# Patient Record
Sex: MALE | Race: BLACK OR AFRICAN AMERICAN | Employment: OTHER | URBAN - METROPOLITAN AREA
[De-identification: names, ages, dates, MRNs, and addresses within clinical notes are randomized per-mention and may not be internally consistent; named-entity substitution may affect disease eponyms.]

---

## 2017-04-07 ENCOUNTER — OFFICE VISIT (OUTPATIENT)
Dept: INTERNAL MEDICINE | Age: 72
End: 2017-04-07

## 2017-04-07 VITALS
SYSTOLIC BLOOD PRESSURE: 118 MMHG | WEIGHT: 207 LBS | DIASTOLIC BLOOD PRESSURE: 80 MMHG | HEIGHT: 64 IN | BODY MASS INDEX: 35.34 KG/M2

## 2017-04-07 DIAGNOSIS — I10 ESSENTIAL HYPERTENSION: Primary | ICD-10-CM

## 2017-04-07 DIAGNOSIS — I50.22 CHRONIC SYSTOLIC CONGESTIVE HEART FAILURE (HCC): ICD-10-CM

## 2017-04-07 DIAGNOSIS — M67.472 GANGLION CYST OF LEFT FOOT: ICD-10-CM

## 2017-04-07 DIAGNOSIS — M70.22 OLECRANON BURSITIS OF LEFT ELBOW: ICD-10-CM

## 2017-04-07 PROCEDURE — 99214 OFFICE O/P EST MOD 30 MIN: CPT | Performed by: INTERNAL MEDICINE

## 2017-04-07 ASSESSMENT — ENCOUNTER SYMPTOMS
SHORTNESS OF BREATH: 1
CHEST TIGHTNESS: 0
BACK PAIN: 0
ABDOMINAL PAIN: 0
EYE REDNESS: 0
NAUSEA: 0

## 2017-07-07 ENCOUNTER — TELEPHONE (OUTPATIENT)
Dept: INTERNAL MEDICINE | Age: 72
End: 2017-07-07

## 2017-07-07 DIAGNOSIS — I42.9 CARDIOMYOPATHY (HCC): ICD-10-CM

## 2017-07-07 DIAGNOSIS — I50.22 CHRONIC SYSTOLIC CONGESTIVE HEART FAILURE (HCC): ICD-10-CM

## 2017-07-07 DIAGNOSIS — E78.2 MIXED HYPERLIPIDEMIA: ICD-10-CM

## 2017-07-07 DIAGNOSIS — I10 ESSENTIAL HYPERTENSION: Primary | ICD-10-CM

## 2017-07-07 DIAGNOSIS — R00.0 TACHYCARDIA: ICD-10-CM

## 2017-07-21 ENCOUNTER — OFFICE VISIT (OUTPATIENT)
Dept: INTERNAL MEDICINE | Age: 72
End: 2017-07-21

## 2017-07-21 VITALS
HEIGHT: 64 IN | SYSTOLIC BLOOD PRESSURE: 100 MMHG | BODY MASS INDEX: 34.83 KG/M2 | WEIGHT: 204 LBS | DIASTOLIC BLOOD PRESSURE: 70 MMHG

## 2017-07-21 DIAGNOSIS — I50.22 CHRONIC SYSTOLIC CONGESTIVE HEART FAILURE (HCC): ICD-10-CM

## 2017-07-21 DIAGNOSIS — I10 ESSENTIAL HYPERTENSION: Primary | ICD-10-CM

## 2017-07-21 PROCEDURE — 99213 OFFICE O/P EST LOW 20 MIN: CPT | Performed by: INTERNAL MEDICINE

## 2017-07-21 ASSESSMENT — ENCOUNTER SYMPTOMS
CHEST TIGHTNESS: 0
NAUSEA: 0
EYE REDNESS: 0
BACK PAIN: 0
SHORTNESS OF BREATH: 1
ABDOMINAL PAIN: 0

## 2017-10-18 ENCOUNTER — TELEPHONE (OUTPATIENT)
Dept: INTERNAL MEDICINE | Age: 72
End: 2017-10-18

## 2017-10-18 DIAGNOSIS — Z12.5 SCREENING PSA (PROSTATE SPECIFIC ANTIGEN): ICD-10-CM

## 2017-10-18 DIAGNOSIS — E78.2 MIXED HYPERLIPIDEMIA: ICD-10-CM

## 2017-10-18 DIAGNOSIS — I10 ESSENTIAL HYPERTENSION: Primary | ICD-10-CM

## 2017-10-18 DIAGNOSIS — D50.0 IRON DEFICIENCY ANEMIA DUE TO CHRONIC BLOOD LOSS: ICD-10-CM

## 2017-10-23 RX ORDER — LOVASTATIN 40 MG/1
TABLET ORAL
Qty: 90 TABLET | Refills: 1 | Status: SHIPPED | OUTPATIENT
Start: 2017-10-23 | End: 2019-01-16 | Stop reason: SDUPTHER

## 2017-10-23 RX ORDER — FERROUS SULFATE 325(65) MG
TABLET ORAL
Qty: 60 TABLET | Refills: 5 | Status: SHIPPED | OUTPATIENT
Start: 2017-10-23 | End: 2018-12-05 | Stop reason: SDUPTHER

## 2017-11-08 ENCOUNTER — OFFICE VISIT (OUTPATIENT)
Dept: INTERNAL MEDICINE | Age: 72
End: 2017-11-08

## 2017-11-08 VITALS
DIASTOLIC BLOOD PRESSURE: 78 MMHG | SYSTOLIC BLOOD PRESSURE: 126 MMHG | HEART RATE: 76 BPM | BODY MASS INDEX: 33.81 KG/M2 | OXYGEN SATURATION: 98 % | WEIGHT: 197 LBS

## 2017-11-08 DIAGNOSIS — Z12.5 SCREENING PSA (PROSTATE SPECIFIC ANTIGEN): ICD-10-CM

## 2017-11-08 DIAGNOSIS — Z23 NEED FOR INFLUENZA VACCINATION: ICD-10-CM

## 2017-11-08 DIAGNOSIS — G47.9 SLEEP DISORDER: ICD-10-CM

## 2017-11-08 DIAGNOSIS — I10 ESSENTIAL HYPERTENSION: ICD-10-CM

## 2017-11-08 DIAGNOSIS — E78.2 MIXED HYPERLIPIDEMIA: ICD-10-CM

## 2017-11-08 DIAGNOSIS — N18.4 CHRONIC KIDNEY DISEASE (CKD), STAGE IV (SEVERE) (HCC): ICD-10-CM

## 2017-11-08 DIAGNOSIS — Z00.00 WELL ADULT EXAM: Primary | ICD-10-CM

## 2017-11-08 DIAGNOSIS — D50.0 IRON DEFICIENCY ANEMIA DUE TO CHRONIC BLOOD LOSS: ICD-10-CM

## 2017-11-08 DIAGNOSIS — I50.22 CHRONIC SYSTOLIC CONGESTIVE HEART FAILURE (HCC): ICD-10-CM

## 2017-11-08 LAB
A/G RATIO: 1.2 (ref 1.1–2.2)
ALBUMIN SERPL-MCNC: 3.7 G/DL (ref 3.4–5)
ALP BLD-CCNC: 83 U/L (ref 40–129)
ALT SERPL-CCNC: 9 U/L (ref 10–40)
ANION GAP SERPL CALCULATED.3IONS-SCNC: 13 MMOL/L (ref 3–16)
AST SERPL-CCNC: 14 U/L (ref 15–37)
BILIRUB SERPL-MCNC: 0.3 MG/DL (ref 0–1)
BUN BLDV-MCNC: 29 MG/DL (ref 7–20)
CALCIUM SERPL-MCNC: 9.3 MG/DL (ref 8.3–10.6)
CHLORIDE BLD-SCNC: 100 MMOL/L (ref 99–110)
CHOLESTEROL, TOTAL: 213 MG/DL (ref 0–199)
CO2: 29 MMOL/L (ref 21–32)
CREAT SERPL-MCNC: 2.3 MG/DL (ref 0.8–1.3)
GFR AFRICAN AMERICAN: 34
GFR NON-AFRICAN AMERICAN: 28
GLOBULIN: 3 G/DL
GLUCOSE BLD-MCNC: 121 MG/DL (ref 70–99)
HCT VFR BLD CALC: 39 % (ref 40.5–52.5)
HDLC SERPL-MCNC: 55 MG/DL (ref 40–60)
HEMOGLOBIN: 12.6 G/DL (ref 13.5–17.5)
LDL CHOLESTEROL CALCULATED: 131 MG/DL
MCH RBC QN AUTO: 31.2 PG (ref 26–34)
MCHC RBC AUTO-ENTMCNC: 32.4 G/DL (ref 31–36)
MCV RBC AUTO: 96.5 FL (ref 80–100)
PDW BLD-RTO: 13.7 % (ref 12.4–15.4)
PLATELET # BLD: 265 K/UL (ref 135–450)
PMV BLD AUTO: 9.2 FL (ref 5–10.5)
POTASSIUM SERPL-SCNC: 4.7 MMOL/L (ref 3.5–5.1)
PROSTATE SPECIFIC ANTIGEN: 1.45 NG/ML (ref 0–4)
RBC # BLD: 4.04 M/UL (ref 4.2–5.9)
SODIUM BLD-SCNC: 142 MMOL/L (ref 136–145)
TOTAL PROTEIN: 6.7 G/DL (ref 6.4–8.2)
TRIGL SERPL-MCNC: 133 MG/DL (ref 0–150)
TSH SERPL DL<=0.05 MIU/L-ACNC: 1.34 UIU/ML (ref 0.27–4.2)
VLDLC SERPL CALC-MCNC: 27 MG/DL
WBC # BLD: 5.8 K/UL (ref 4–11)

## 2017-11-08 PROCEDURE — G0008 ADMIN INFLUENZA VIRUS VAC: HCPCS | Performed by: INTERNAL MEDICINE

## 2017-11-08 PROCEDURE — G0439 PPPS, SUBSEQ VISIT: HCPCS | Performed by: INTERNAL MEDICINE

## 2017-11-08 PROCEDURE — 90662 IIV NO PRSV INCREASED AG IM: CPT | Performed by: INTERNAL MEDICINE

## 2017-11-08 ASSESSMENT — ENCOUNTER SYMPTOMS
BACK PAIN: 0
ABDOMINAL PAIN: 0
SHORTNESS OF BREATH: 0
COUGH: 0
NAUSEA: 0
EYE REDNESS: 0
CHEST TIGHTNESS: 0

## 2017-11-08 ASSESSMENT — PATIENT HEALTH QUESTIONNAIRE - PHQ9
1. LITTLE INTEREST OR PLEASURE IN DOING THINGS: 1
SUM OF ALL RESPONSES TO PHQ9 QUESTIONS 1 & 2: 2
2. FEELING DOWN, DEPRESSED OR HOPELESS: 1
SUM OF ALL RESPONSES TO PHQ QUESTIONS 1-9: 2

## 2017-11-08 NOTE — PROGRESS NOTES
Vaccine Information Sheet, \"Influenza - Inactivated\"  given to Melony Caro, or parent/legal guardian of  Melony Caro and verbalized understanding. Patient responses:    Have you ever had a reaction to a flu vaccine? No  Are you able to eat eggs without adverse effects? Yes  Do you have any current illness? No  Have you ever had [place] Syndrome? No    Flu vaccine given per order. Please see immunization tab.

## 2017-11-08 NOTE — PROGRESS NOTES
Subjective:      Patient ID: Cb Bradley is a 71 y.o. male. Chief Complaint   Patient presents with    Annual Exam     HPI     Well check. He is here with his wife. He does not have a Living Will , but he says he would want everything done. He lives in a 2 story with his wife , and they have done accident reduction. Current Outpatient Prescriptions on File Prior to Visit   Medication Sig Dispense Refill    ferrous sulfate 325 (65 Fe) MG tablet TAKE 1 TABLET BY MOUTH TWICE DAILY WITH MEALS 60 tablet 5    lovastatin (MEVACOR) 40 MG tablet TAKE 1 TABLET BY MOUTH EVERY NIGHT AT BEDTIME 90 tablet 1    torsemide (DEMADEX) 100 MG tablet TAKE 1/2 TABLET BY MOUTH EVERY DAY 30 tablet 5    DOCQLACE 100 MG capsule TAKE ONE CAPSULE BY MOUTH TWICE DAILY 60 capsule 5    lisinopril (PRINIVIL;ZESTRIL) 10 MG tablet Take 10 mg by mouth daily      potassium chloride SA (K-DUR;KLOR-CON M) 20 MEQ tablet       pantoprazole (PROTONIX) 40 MG tablet TAKE 1 TABLET BY MOUTH EVERY DAY 90 tablet 3    aspirin 81 MG chewable tablet Take 81 mg by mouth daily       No current facility-administered medications on file prior to visit.      ,all  Past Medical History:   Diagnosis Date    Cardiomyopathy ([facility] [address]     Cerebrovascular disease     HTN (hypertension) 8/16/2011    Other and unspecified hyperlipidemia 8/16/2011    Sleep disorder     Tachycardia 8/16/2011    TIA (transient ischemic attack) 8/16/2011     No past surgical history on file.   Social History     Social History    Marital status:      Spouse name: N/A    Number of children: N/A    Years of education: N/A     Occupational History           Social History Main Topics    Smoking status: Current Some Day Smoker     Packs/day: 0.50     Types: Cigarettes    Smokeless tobacco: Never Used    Alcohol use No    Drug use: No    Sexual activity: Not on file     Other Topics Concern    Not on file     Social History Narrative    He follows track and field sports. Family History   Problem Relation Age of Onset    Osteoarthritis Father     Hypertension Father     Cancer Father      prostate, rectal     Immunization History   Administered Date(s) Administered    Influenza, High Dose 12/02/2016, 11/08/2017    Pneumococcal 13-valent Conjugate (Felix Ramirez) 03/08/2016       Review of Systems   Constitutional: Negative for fatigue, fever and unexpected weight change. HENT: Negative for congestion and hearing loss. Eyes: Negative for redness and visual disturbance. Respiratory: Negative for cough, chest tightness and shortness of breath. Cardiovascular: Negative for chest pain and palpitations. Gastrointestinal: Negative for abdominal pain and nausea. Endocrine: Negative for polydipsia and polyuria. Genitourinary: Negative for dysuria and hematuria. Musculoskeletal: Negative for arthralgias, back pain and neck pain. Functional ability, able to perform ADLs   Skin: Negative for rash and wound. Neurological: Negative for dizziness and headaches. Hematological: Negative for adenopathy. Does not bruise/bleed easily. Psychiatric/Behavioral: Negative for agitation and confusion. The patient is not nervous/anxious. Cognitive impairment, no       Depression, no        Objective:   Physical Exam   Constitutional: He is oriented to person, place, and time. He appears well-developed and well-nourished. HENT:   Head: Normocephalic and atraumatic. Right Ear: External ear normal.   Left Ear: External ear normal.   Mouth/Throat: Oropharynx is clear and moist. No oropharyngeal exudate. Eyes: Conjunctivae are normal. Pupils are equal, round, and reactive to light. Neck: Normal range of motion. No thyromegaly present. Cardiovascular: Normal rate, regular rhythm and normal heart sounds. No murmur heard. Pulmonary/Chest: Effort normal and breath sounds normal. He has no wheezes. He has no rales. Abdominal: Soft.  Bowel sounds are normal. He exhibits no distension. There is no tenderness. There is no rebound. Musculoskeletal: He exhibits no edema. Lymphadenopathy:     He has no cervical adenopathy. Neurological: He is alert and oriented to person, place, and time. Coordination normal.   Cognitive Impairment, no    Skin: No rash noted. Psychiatric: He has a normal mood and affect. Depression, no        Assessment and plan       1. Well adult exam  Stable. See orders. 2. Essential hypertension  Stable. Continue medications. 3. Chronic systolic congestive heart failure (HCC)  Stable. Continue medications. 4. Mixed hyperlipidemia  Stable. Continue medication. 5. Sleep disorder  Patient refuses treatment. Continuing adverse effects likely. Encouraged to reconsider sleep evaluation. 6. Need for influenza vaccination  Stable. Flu shot today. - INFLUENZA, HIGH DOSE, 65 YRS +, IM, PF, PREFILL SYR, 0.5ML (FLUZONE HD)    Quality & Risk Score Accuracy - MEDICARE ADVANTAGE    Visit Dx:  Chronic kidney disease (CKD), stage IV (severe) (HCC)  Improving based upon last GFR. Continue current treatment plan, including avoidance of nephrotoxins, and follow up at least yearly. Additional documentation:  Based on review of the following: Last GFR:  34 on 11/8/17,Last GFR:  28 on 11/8/17, The blood pressure is 126/78.   Last edited 11/08/17 19:05 EST by Emeka Pantoja MD

## 2017-12-01 ENCOUNTER — OFFICE VISIT (OUTPATIENT)
Dept: INTERNAL MEDICINE | Age: 72
End: 2017-12-01

## 2017-12-01 VITALS
DIASTOLIC BLOOD PRESSURE: 70 MMHG | HEIGHT: 66 IN | BODY MASS INDEX: 32.14 KG/M2 | OXYGEN SATURATION: 98 % | SYSTOLIC BLOOD PRESSURE: 107 MMHG | HEART RATE: 82 BPM | WEIGHT: 200 LBS

## 2017-12-01 DIAGNOSIS — L82.1 SEBORRHEIC KERATOSIS: Primary | ICD-10-CM

## 2017-12-01 PROCEDURE — 99213 OFFICE O/P EST LOW 20 MIN: CPT | Performed by: INTERNAL MEDICINE

## 2017-12-01 ASSESSMENT — ENCOUNTER SYMPTOMS
BACK PAIN: 0
EYE REDNESS: 0
CHEST TIGHTNESS: 0
SHORTNESS OF BREATH: 0
ABDOMINAL PAIN: 0
NAUSEA: 0

## 2017-12-01 NOTE — PROGRESS NOTES
Subjective:      Patient ID: Melony Caro is a 71 y.o. male. Chief Complaint   Patient presents with    Penis Pain     has a growth on penis X 3 days      HPI     Mila Jaquez noted a slightly raised area on the left penile shaft for the last 3 days. It has not changed since he first noted it. He is not sexually active. The area is not red or swollen. The area is not painful and there is no drainage. He has no fever , and no malaise. Current Outpatient Prescriptions on File Prior to Visit   Medication Sig Dispense Refill    ferrous sulfate 325 (65 Fe) MG tablet TAKE 1 TABLET BY MOUTH TWICE DAILY WITH MEALS 60 tablet 5    lovastatin (MEVACOR) 40 MG tablet TAKE 1 TABLET BY MOUTH EVERY NIGHT AT BEDTIME 90 tablet 1    torsemide (DEMADEX) 100 MG tablet TAKE 1/2 TABLET BY MOUTH EVERY DAY 30 tablet 5    DOCQLACE 100 MG capsule TAKE ONE CAPSULE BY MOUTH TWICE DAILY 60 capsule 5    lisinopril (PRINIVIL;ZESTRIL) 10 MG tablet Take 10 mg by mouth daily      potassium chloride SA (K-DUR;KLOR-CON M) 20 MEQ tablet       pantoprazole (PROTONIX) 40 MG tablet TAKE 1 TABLET BY MOUTH EVERY DAY 90 tablet 3    aspirin 81 MG chewable tablet Take 81 mg by mouth daily       No current facility-administered medications on file prior to visit. No Known Allergies      Review of Systems   Constitutional: Negative for fatigue, fever and unexpected weight change. HENT: Negative for hearing loss. Eyes: Negative for redness and visual disturbance. Respiratory: Negative for chest tightness and shortness of breath. Cardiovascular: Negative for chest pain and palpitations. Gastrointestinal: Negative for abdominal pain and nausea. Endocrine: Negative for polydipsia and polyuria. Genitourinary: Negative for dysuria and hematuria. Musculoskeletal: Negative for arthralgias, back pain and neck pain. Skin: Negative for rash and wound. Neurological: Negative for dizziness and headaches.    Hematological: Negative for

## 2018-03-06 ENCOUNTER — TELEPHONE (OUTPATIENT)
Dept: INTERNAL MEDICINE | Age: 73
End: 2018-03-06

## 2018-03-06 DIAGNOSIS — N18.30 CRF (CHRONIC RENAL FAILURE), STAGE 3 (MODERATE) (HCC): ICD-10-CM

## 2018-03-06 DIAGNOSIS — I50.22 CHRONIC SYSTOLIC CONGESTIVE HEART FAILURE (HCC): ICD-10-CM

## 2018-03-06 DIAGNOSIS — D50.0 IRON DEFICIENCY ANEMIA DUE TO CHRONIC BLOOD LOSS: Primary | ICD-10-CM

## 2018-03-06 NOTE — TELEPHONE ENCOUNTER
Pt requesting Lab orders for an appointment pt has scheduled tomorrow 03/07/2018 with Dr. Demetra Marie. Pt would like to have labs done before he comes in to see Dr. Demetra Marie. Please call patient when complete.

## 2018-03-07 ENCOUNTER — OFFICE VISIT (OUTPATIENT)
Dept: INTERNAL MEDICINE | Age: 73
End: 2018-03-07

## 2018-03-07 VITALS
SYSTOLIC BLOOD PRESSURE: 104 MMHG | WEIGHT: 194 LBS | HEART RATE: 80 BPM | DIASTOLIC BLOOD PRESSURE: 64 MMHG | BODY MASS INDEX: 31.31 KG/M2

## 2018-03-07 DIAGNOSIS — N18.30 CRF (CHRONIC RENAL FAILURE), STAGE 3 (MODERATE) (HCC): ICD-10-CM

## 2018-03-07 DIAGNOSIS — I50.22 CHRONIC SYSTOLIC CONGESTIVE HEART FAILURE (HCC): ICD-10-CM

## 2018-03-07 DIAGNOSIS — Z23 NEED FOR PNEUMOCOCCAL VACCINE: ICD-10-CM

## 2018-03-07 DIAGNOSIS — I10 ESSENTIAL HYPERTENSION: ICD-10-CM

## 2018-03-07 DIAGNOSIS — D50.0 IRON DEFICIENCY ANEMIA DUE TO CHRONIC BLOOD LOSS: ICD-10-CM

## 2018-03-07 DIAGNOSIS — I50.22 CHRONIC SYSTOLIC CONGESTIVE HEART FAILURE (HCC): Primary | ICD-10-CM

## 2018-03-07 LAB
ALBUMIN SERPL-MCNC: 4.3 G/DL (ref 3.4–5)
ANION GAP SERPL CALCULATED.3IONS-SCNC: 15 MMOL/L (ref 3–16)
BASOPHILS ABSOLUTE: 0 K/UL (ref 0–0.2)
BASOPHILS RELATIVE PERCENT: 0.4 %
BUN BLDV-MCNC: 32 MG/DL (ref 7–20)
CALCIUM SERPL-MCNC: 9.1 MG/DL (ref 8.3–10.6)
CHLORIDE BLD-SCNC: 97 MMOL/L (ref 99–110)
CO2: 29 MMOL/L (ref 21–32)
CREAT SERPL-MCNC: 2.7 MG/DL (ref 0.8–1.3)
EOSINOPHILS ABSOLUTE: 0.1 K/UL (ref 0–0.6)
EOSINOPHILS RELATIVE PERCENT: 2.8 %
GFR AFRICAN AMERICAN: 28
GFR NON-AFRICAN AMERICAN: 23
GLUCOSE BLD-MCNC: 91 MG/DL (ref 70–99)
HCT VFR BLD CALC: 39.3 % (ref 40.5–52.5)
HEMOGLOBIN: 12.6 G/DL (ref 13.5–17.5)
LYMPHOCYTES ABSOLUTE: 1.3 K/UL (ref 1–5.1)
LYMPHOCYTES RELATIVE PERCENT: 24.8 %
MCH RBC QN AUTO: 30.9 PG (ref 26–34)
MCHC RBC AUTO-ENTMCNC: 32 G/DL (ref 31–36)
MCV RBC AUTO: 96.5 FL (ref 80–100)
MONOCYTES ABSOLUTE: 0.8 K/UL (ref 0–1.3)
MONOCYTES RELATIVE PERCENT: 14.8 %
NEUTROPHILS ABSOLUTE: 3 K/UL (ref 1.7–7.7)
NEUTROPHILS RELATIVE PERCENT: 57.2 %
PDW BLD-RTO: 14.3 % (ref 12.4–15.4)
PHOSPHORUS: 3.9 MG/DL (ref 2.5–4.9)
PLATELET # BLD: 298 K/UL (ref 135–450)
PMV BLD AUTO: 9 FL (ref 5–10.5)
POTASSIUM SERPL-SCNC: 4.6 MMOL/L (ref 3.5–5.1)
PRO-BNP: 385 PG/ML (ref 0–124)
RBC # BLD: 4.07 M/UL (ref 4.2–5.9)
SODIUM BLD-SCNC: 141 MMOL/L (ref 136–145)
WBC # BLD: 5.3 K/UL (ref 4–11)

## 2018-03-07 PROCEDURE — 90732 PPSV23 VACC 2 YRS+ SUBQ/IM: CPT | Performed by: INTERNAL MEDICINE

## 2018-03-07 PROCEDURE — G0009 ADMIN PNEUMOCOCCAL VACCINE: HCPCS | Performed by: INTERNAL MEDICINE

## 2018-03-07 PROCEDURE — 99214 OFFICE O/P EST MOD 30 MIN: CPT | Performed by: INTERNAL MEDICINE

## 2018-03-07 RX ORDER — CALCITRIOL 0.25 UG/1
0.25 CAPSULE, LIQUID FILLED ORAL EVERY OTHER DAY
COMMUNITY
End: 2020-05-15

## 2018-03-07 RX ORDER — ERGOCALCIFEROL (VITAMIN D2) 1250 MCG
50000 CAPSULE ORAL WEEKLY
COMMUNITY
End: 2020-05-15

## 2018-03-07 ASSESSMENT — ENCOUNTER SYMPTOMS
ABDOMINAL PAIN: 0
EYE REDNESS: 0
NAUSEA: 0
SHORTNESS OF BREATH: 1
BACK PAIN: 0
CHEST TIGHTNESS: 0

## 2018-03-07 NOTE — PROGRESS NOTES
Subjective:      Patient ID: Dante Madden is a 71 y.o. male. Chief Complaint   Patient presents with    Hypertension     follow up           Hypertension   This is a chronic problem. The current episode started more than 1 year ago. The problem is unchanged. The problem is controlled. Associated symptoms include malaise/fatigue and shortness of breath. Pertinent negatives include no chest pain, headaches, neck pain or palpitations. There are no associated agents to hypertension. Risk factors for coronary artery disease include smoking/tobacco exposure, sedentary lifestyle and obesity. Past treatments include ACE inhibitors and diuretics. The current treatment provides significant improvement. Compliance problems include psychosocial issues. Congestive Heart Failure   Presents for follow-up visit. Associated symptoms include fatigue and shortness of breath. Pertinent negatives include no abdominal pain, chest pain, palpitations or unexpected weight change. The symptoms have been stable. Compliance with total regimen is 26-50%. Compliance problems include adherence to diet and medication side effects. Compliance with diet is 26-50%. Compliance with exercise is 0-25%. Compliance with medications is 26-50%. Chronic renal failure. Patient continues on his diuretic and ACE inhibitor. He is trying to modify his protein intake. He is compliant with regular office visits to maintain his fluid status. He denies any increased edema in his lower extremities. He says his urine production has been stable.     Current Outpatient Prescriptions on File Prior to Visit   Medication Sig Dispense Refill    ferrous sulfate 325 (65 Fe) MG tablet TAKE 1 TABLET BY MOUTH TWICE DAILY WITH MEALS 60 tablet 5    lovastatin (MEVACOR) 40 MG tablet TAKE 1 TABLET BY MOUTH EVERY NIGHT AT BEDTIME 90 tablet 1    torsemide (DEMADEX) 100 MG tablet TAKE 1/2 TABLET BY MOUTH EVERY DAY 30 tablet 5    DOCQLACE 100 MG capsule TAKE ONE CAPSULE BY MOUTH TWICE DAILY 60 capsule 5    lisinopril (PRINIVIL;ZESTRIL) 10 MG tablet Take 10 mg by mouth daily      potassium chloride SA (K-DUR;KLOR-CON M) 20 MEQ tablet       aspirin 81 MG chewable tablet Take 81 mg by mouth daily       No current facility-administered medications on file prior to visit. No Known Allergies      Review of Systems   Constitutional: Positive for fatigue and malaise/fatigue. Negative for fever and unexpected weight change. HENT: Negative for hearing loss. Eyes: Negative for redness and visual disturbance. Respiratory: Positive for shortness of breath. Negative for chest tightness. Cardiovascular: Negative for chest pain and palpitations. Gastrointestinal: Negative for abdominal pain and nausea. Endocrine: Negative for cold intolerance, polydipsia and polyuria. Genitourinary: Negative for dysuria and hematuria. Musculoskeletal: Negative for arthralgias, back pain and neck pain. Skin: Negative for rash and wound. Allergic/Immunologic: Negative for environmental allergies. Neurological: Negative for dizziness and headaches. Hematological: Negative for adenopathy. Does not bruise/bleed easily. Psychiatric/Behavioral: Positive for sleep disturbance. Negative for agitation. The patient is not nervous/anxious. Objective:   Physical Exam   Constitutional: He is oriented to person, place, and time. He appears well-developed and well-nourished. Cardiovascular: Normal rate, regular rhythm and normal heart sounds. No murmur heard. Pulmonary/Chest: Effort normal and breath sounds normal. He has no wheezes. He has no rales. Musculoskeletal: He exhibits no edema. Neurological: He is alert and oriented to person, place, and time. Skin: No rash noted. Assessment and plan       1. Essential hypertension  Stable. Continue current medication. 2. Chronic systolic congestive heart failure (HCC)  Stable. Continue current medications.     3.

## 2018-07-31 ENCOUNTER — TELEPHONE (OUTPATIENT)
Dept: INTERNAL MEDICINE | Age: 73
End: 2018-07-31

## 2018-07-31 DIAGNOSIS — I10 ESSENTIAL HYPERTENSION: ICD-10-CM

## 2018-07-31 DIAGNOSIS — I50.22 CHRONIC SYSTOLIC CONGESTIVE HEART FAILURE (HCC): Primary | ICD-10-CM

## 2018-07-31 RX ORDER — LISINOPRIL 10 MG/1
10 TABLET ORAL DAILY
Qty: 90 TABLET | Refills: 3 | Status: SHIPPED | OUTPATIENT
Start: 2018-07-31 | End: 2019-06-25

## 2018-09-25 ENCOUNTER — TELEPHONE (OUTPATIENT)
Dept: INTERNAL MEDICINE CLINIC | Age: 73
End: 2018-09-25

## 2018-09-25 DIAGNOSIS — Z12.5 SCREENING PSA (PROSTATE SPECIFIC ANTIGEN): ICD-10-CM

## 2018-09-25 DIAGNOSIS — N18.30 CRF (CHRONIC RENAL FAILURE), STAGE 3 (MODERATE) (HCC): ICD-10-CM

## 2018-09-25 DIAGNOSIS — E78.2 MIXED HYPERLIPIDEMIA: Primary | ICD-10-CM

## 2018-09-25 DIAGNOSIS — D64.9 ANEMIA, UNSPECIFIED TYPE: ICD-10-CM

## 2018-09-25 DIAGNOSIS — Z00.00 WELL ADULT EXAM: ICD-10-CM

## 2018-10-22 ENCOUNTER — OFFICE VISIT (OUTPATIENT)
Dept: INTERNAL MEDICINE CLINIC | Age: 73
End: 2018-10-22
Payer: MEDICARE

## 2018-10-22 VITALS
BODY MASS INDEX: 34.21 KG/M2 | OXYGEN SATURATION: 95 % | RESPIRATION RATE: 16 BRPM | HEIGHT: 64 IN | DIASTOLIC BLOOD PRESSURE: 60 MMHG | HEART RATE: 91 BPM | WEIGHT: 200.4 LBS | SYSTOLIC BLOOD PRESSURE: 100 MMHG

## 2018-10-22 DIAGNOSIS — M10.371 ACUTE GOUT DUE TO RENAL IMPAIRMENT INVOLVING RIGHT FOOT: ICD-10-CM

## 2018-10-22 DIAGNOSIS — J40 BRONCHITIS: Primary | ICD-10-CM

## 2018-10-22 DIAGNOSIS — Z72.0 TOBACCO ABUSE: ICD-10-CM

## 2018-10-22 DIAGNOSIS — Z13.6 SCREENING FOR AAA (ABDOMINAL AORTIC ANEURYSM): ICD-10-CM

## 2018-10-22 PROCEDURE — 99213 OFFICE O/P EST LOW 20 MIN: CPT | Performed by: INTERNAL MEDICINE

## 2018-10-22 PROCEDURE — G8510 SCR DEP NEG, NO PLAN REQD: HCPCS | Performed by: INTERNAL MEDICINE

## 2018-10-22 RX ORDER — DOXYCYCLINE HYCLATE 100 MG
100 TABLET ORAL 2 TIMES DAILY
COMMUNITY
End: 2018-10-22 | Stop reason: SDUPTHER

## 2018-10-22 RX ORDER — NICOTINE 21 MG/24HR
1 PATCH, TRANSDERMAL 24 HOURS TRANSDERMAL EVERY 24 HOURS
Qty: 30 PATCH | Refills: 3 | Status: SHIPPED | OUTPATIENT
Start: 2018-10-22 | End: 2019-12-02

## 2018-10-22 RX ORDER — PREDNISONE 10 MG/1
10 TABLET ORAL SEE ADMIN INSTRUCTIONS
Qty: 30 TABLET | Refills: 0 | Status: SHIPPED | OUTPATIENT
Start: 2018-10-22 | End: 2018-12-14 | Stop reason: SDUPTHER

## 2018-10-22 RX ORDER — DOXYCYCLINE HYCLATE 100 MG
100 TABLET ORAL 2 TIMES DAILY WITH MEALS
Qty: 14 TABLET | Refills: 0 | Status: SHIPPED | OUTPATIENT
Start: 2018-10-22 | End: 2018-10-29 | Stop reason: ALTCHOICE

## 2018-10-22 RX ORDER — ALBUTEROL SULFATE 90 UG/1
2 AEROSOL, METERED RESPIRATORY (INHALATION) EVERY 6 HOURS PRN
Qty: 1 INHALER | Status: CANCELLED | OUTPATIENT
Start: 2018-10-22

## 2018-10-22 RX ORDER — CARVEDILOL 6.25 MG/1
12.5 TABLET ORAL DAILY
COMMUNITY
End: 2018-12-12 | Stop reason: SDUPTHER

## 2018-10-22 RX ORDER — ALBUTEROL SULFATE 90 UG/1
2 AEROSOL, METERED RESPIRATORY (INHALATION) EVERY 4 HOURS PRN
Qty: 1 INHALER | Refills: 3 | Status: SHIPPED | OUTPATIENT
Start: 2018-10-22 | End: 2020-09-25

## 2018-10-22 RX ORDER — ALBUTEROL SULFATE 90 UG/1
2 AEROSOL, METERED RESPIRATORY (INHALATION) EVERY 6 HOURS PRN
COMMUNITY
End: 2019-12-02

## 2018-10-22 ASSESSMENT — ENCOUNTER SYMPTOMS
SHORTNESS OF BREATH: 0
EYE REDNESS: 0
BACK PAIN: 0
CHEST TIGHTNESS: 0
NAUSEA: 0
WHEEZING: 0
COUGH: 1
ABDOMINAL PAIN: 0

## 2018-10-22 ASSESSMENT — PATIENT HEALTH QUESTIONNAIRE - PHQ9
SUM OF ALL RESPONSES TO PHQ QUESTIONS 1-9: 0
SUM OF ALL RESPONSES TO PHQ9 QUESTIONS 1 & 2: 0
1. LITTLE INTEREST OR PLEASURE IN DOING THINGS: 0
2. FEELING DOWN, DEPRESSED OR HOPELESS: 0
SUM OF ALL RESPONSES TO PHQ QUESTIONS 1-9: 0

## 2018-11-09 DIAGNOSIS — N18.30 CRF (CHRONIC RENAL FAILURE), STAGE 3 (MODERATE) (HCC): ICD-10-CM

## 2018-11-09 DIAGNOSIS — E78.2 MIXED HYPERLIPIDEMIA: ICD-10-CM

## 2018-11-09 DIAGNOSIS — D64.9 ANEMIA, UNSPECIFIED TYPE: ICD-10-CM

## 2018-11-09 DIAGNOSIS — Z12.5 SCREENING PSA (PROSTATE SPECIFIC ANTIGEN): ICD-10-CM

## 2018-11-09 LAB
A/G RATIO: 1.6 (ref 1.1–2.2)
ALBUMIN SERPL-MCNC: 3.9 G/DL (ref 3.4–5)
ALP BLD-CCNC: 67 U/L (ref 40–129)
ALT SERPL-CCNC: 7 U/L (ref 10–40)
ANION GAP SERPL CALCULATED.3IONS-SCNC: 16 MMOL/L (ref 3–16)
AST SERPL-CCNC: 10 U/L (ref 15–37)
BASOPHILS ABSOLUTE: 0 K/UL (ref 0–0.2)
BASOPHILS RELATIVE PERCENT: 0.6 %
BILIRUB SERPL-MCNC: 0.3 MG/DL (ref 0–1)
BUN BLDV-MCNC: 29 MG/DL (ref 7–20)
CALCIUM SERPL-MCNC: 9.4 MG/DL (ref 8.3–10.6)
CHLORIDE BLD-SCNC: 103 MMOL/L (ref 99–110)
CHOLESTEROL, TOTAL: 192 MG/DL (ref 0–199)
CO2: 25 MMOL/L (ref 21–32)
CREAT SERPL-MCNC: 2.2 MG/DL (ref 0.8–1.3)
EOSINOPHILS ABSOLUTE: 0.1 K/UL (ref 0–0.6)
EOSINOPHILS RELATIVE PERCENT: 2.5 %
GFR AFRICAN AMERICAN: 36
GFR NON-AFRICAN AMERICAN: 30
GLOBULIN: 2.5 G/DL
GLUCOSE BLD-MCNC: 95 MG/DL (ref 70–99)
HCT VFR BLD CALC: 31.8 % (ref 40.5–52.5)
HDLC SERPL-MCNC: 69 MG/DL (ref 40–60)
HEMOGLOBIN: 10.1 G/DL (ref 13.5–17.5)
LDL CHOLESTEROL CALCULATED: 101 MG/DL
LYMPHOCYTES ABSOLUTE: 1 K/UL (ref 1–5.1)
LYMPHOCYTES RELATIVE PERCENT: 17.5 %
MCH RBC QN AUTO: 30.5 PG (ref 26–34)
MCHC RBC AUTO-ENTMCNC: 31.9 G/DL (ref 31–36)
MCV RBC AUTO: 95.8 FL (ref 80–100)
MONOCYTES ABSOLUTE: 0.6 K/UL (ref 0–1.3)
MONOCYTES RELATIVE PERCENT: 11.2 %
NEUTROPHILS ABSOLUTE: 3.8 K/UL (ref 1.7–7.7)
NEUTROPHILS RELATIVE PERCENT: 68.2 %
PDW BLD-RTO: 16.2 % (ref 12.4–15.4)
PLATELET # BLD: 222 K/UL (ref 135–450)
PMV BLD AUTO: 8.9 FL (ref 5–10.5)
POTASSIUM SERPL-SCNC: 4.6 MMOL/L (ref 3.5–5.1)
PROSTATE SPECIFIC ANTIGEN: 1.58 NG/ML (ref 0–4)
RBC # BLD: 3.32 M/UL (ref 4.2–5.9)
SODIUM BLD-SCNC: 144 MMOL/L (ref 136–145)
TOTAL PROTEIN: 6.4 G/DL (ref 6.4–8.2)
TRIGL SERPL-MCNC: 111 MG/DL (ref 0–150)
TSH SERPL DL<=0.05 MIU/L-ACNC: 1.12 UIU/ML (ref 0.27–4.2)
VLDLC SERPL CALC-MCNC: 22 MG/DL
WBC # BLD: 5.5 K/UL (ref 4–11)

## 2018-11-12 ENCOUNTER — OFFICE VISIT (OUTPATIENT)
Dept: INTERNAL MEDICINE CLINIC | Age: 73
End: 2018-11-12
Payer: MEDICARE

## 2018-11-12 VITALS
RESPIRATION RATE: 14 BRPM | WEIGHT: 202 LBS | HEART RATE: 79 BPM | OXYGEN SATURATION: 95 % | DIASTOLIC BLOOD PRESSURE: 62 MMHG | SYSTOLIC BLOOD PRESSURE: 110 MMHG | BODY MASS INDEX: 34.49 KG/M2 | HEIGHT: 64 IN

## 2018-11-12 DIAGNOSIS — I42.0 DILATED CARDIOMYOPATHY (HCC): ICD-10-CM

## 2018-11-12 DIAGNOSIS — E78.2 MIXED HYPERLIPIDEMIA: ICD-10-CM

## 2018-11-12 DIAGNOSIS — N18.30 CRF (CHRONIC RENAL FAILURE), STAGE 3 (MODERATE) (HCC): ICD-10-CM

## 2018-11-12 DIAGNOSIS — I50.22 CHRONIC SYSTOLIC CONGESTIVE HEART FAILURE (HCC): ICD-10-CM

## 2018-11-12 DIAGNOSIS — G47.9 SLEEP DISORDER: ICD-10-CM

## 2018-11-12 DIAGNOSIS — Z00.00 WELL ADULT EXAM: Primary | ICD-10-CM

## 2018-11-12 DIAGNOSIS — I10 ESSENTIAL HYPERTENSION: ICD-10-CM

## 2018-11-12 DIAGNOSIS — F33.1 MODERATE EPISODE OF RECURRENT MAJOR DEPRESSIVE DISORDER (HCC): ICD-10-CM

## 2018-11-12 DIAGNOSIS — Z00.00 ROUTINE GENERAL MEDICAL EXAMINATION AT A HEALTH CARE FACILITY: ICD-10-CM

## 2018-11-12 PROCEDURE — G0438 PPPS, INITIAL VISIT: HCPCS | Performed by: INTERNAL MEDICINE

## 2018-11-12 ASSESSMENT — ENCOUNTER SYMPTOMS
EYE REDNESS: 0
BACK PAIN: 0
NAUSEA: 0
SHORTNESS OF BREATH: 0
CHEST TIGHTNESS: 0
ABDOMINAL PAIN: 0

## 2018-11-12 ASSESSMENT — PATIENT HEALTH QUESTIONNAIRE - PHQ9: SUM OF ALL RESPONSES TO PHQ QUESTIONS 1-9: 11

## 2018-11-12 NOTE — PATIENT INSTRUCTIONS
Personalized Preventive Plan for [facility] DENISE - SHERWIN - 11/12/2018  Medicare offers a range of preventive health benefits. Some of the tests and screenings are paid in full while other may be subject to a deductible, co-insurance, and/or copay. Some of these benefits include a comprehensive review of your medical history including lifestyle, illnesses that may run in your family, and various assessments and screenings as appropriate. After reviewing your medical record and screening and assessments performed today your provider may have ordered immunizations, labs, imaging, and/or referrals for you. A list of these orders (if applicable) as well as your Preventive Care list are included within your After Visit Summary for your review. Other Preventive Recommendations:    · A preventive eye exam performed by an eye specialist is recommended every 1-2 years to screen for glaucoma; cataracts, macular degeneration, and other eye disorders. · A preventive dental visit is recommended every 6 months. · Try to get at least 150 minutes of exercise per week or 10,000 steps per day on a pedometer . · Order or download the FREE \"Exercise & Physical Activity: Your Everyday Guide\" from [employer]. Call 0-181.400.9012 or search [employer] online. · You need 6791-3110 mg of calcium and 2040-9282 IU of vitamin D per day. It is possible to meet your calcium requirement with diet alone, but a vitamin D supplement is usually necessary to meet this goal.  · When exposed to the sun, use a sunscreen that protects against both UVA and UVB radiation with an SPF of 30 or greater. Reapply every 2 to 3 hours or after sweating, drying off with a towel, or swimming. · Always wear a seat belt when traveling in a car. Always wear a helmet when riding a bicycle or motorcycle.

## 2018-11-12 NOTE — PROGRESS NOTES
10/22/18 200 lb 6.4 oz (90.9 kg)   03/07/18 194 lb (88 kg)     Vitals:    11/12/18 1506   BP: 110/62   Site: Right Upper Arm   Position: Sitting   Cuff Size: Large Adult   Pulse: 79   Resp: 14   SpO2: 95%   Weight: 202 lb (91.6 kg)   Height: 5' 4\" (1.626 m)     Body mass index is 34.67 kg/m². General Appearance: alert and oriented to person, place and time, well developed and well- nourished, in no acute distress  Skin: warm and dry, no rash or erythema  Head: normocephalic and atraumatic  Eyes: pupils equal, round, and reactive to light, extraocular eye movements intact, conjunctivae normal  ENT: tympanic membrane, external ear and ear canal normal bilaterally, nose without deformity, nasal mucosa and turbinates normal without polyps  Neck: supple and non-tender without mass, no thyromegaly or thyroid nodules, no cervical lymphadenopathy  Pulmonary/Chest: clear to auscultation bilaterally- no wheezes, rales or rhonchi, normal air movement, no respiratory distress  Cardiovascular: normal rate, regular rhythm, normal S1 and S2, no murmurs, rubs, clicks, or gallops, distal pulses intact, no carotid bruits  Abdomen: soft, non-tender, non-distended, normal bowel sounds, no masses or organomegaly  Extremities: no cyanosis, clubbing or edema  Musculoskeletal: normal range of motion, no joint swelling, deformity or tenderness  Neurologic: reflexes normal and symmetric, no cranial nerve deficit, gait, coordination and speech normal      Patient's complete Health Risk Assessment and screening values have been reviewed and are found in Flowsheets. The following problems were reviewed today and where indicated follow up appointments were made and/or referrals ordered. Positive Risk Factor Screenings with Interventions:     Depression:  PHQ-2 Score: 4  PHQ-9 Total Score: 11  Depression Screening Interpretation: (!) PHQ-9 Score 10-14:  Moderate Depression  Depression Interventions:  · Counseling/psychotherapy referral

## 2018-11-13 ENCOUNTER — HOSPITAL ENCOUNTER (OUTPATIENT)
Dept: ULTRASOUND IMAGING | Age: 73
Discharge: HOME | End: 2018-11-13
Payer: MEDICARE

## 2018-11-13 DIAGNOSIS — Z13.6 SCREENING FOR AAA (ABDOMINAL AORTIC ANEURYSM): ICD-10-CM

## 2018-11-13 PROCEDURE — 76706 US ABDL AORTA SCREEN AAA: CPT

## 2018-11-14 ENCOUNTER — TELEPHONE (OUTPATIENT)
Dept: PHARMACY | Facility: CLINIC | Age: 73
End: 2018-11-14

## 2018-11-15 NOTE — TELEPHONE ENCOUNTER
CLINICAL PHARMACY NOTE  Post-Discharge Transitions of Care (AYANNA)    Made second attempt to reach patient for transitions of care follow-up after discharge from [facility] on 10/17/18. No answer on home phone. Will send letter to patient today and sign off for now.       Diania Lanes, PharmD  [address] [facility] Pharmacist  [phone] or [phone] (Option 7)    CLINICAL PHARMACY NOTE   POST-DISCHARGE TELEPHONE FOLLOW-UP ADDENDUM    For Pharmacy Admin Tracking Only    TCM Call Made?: No  [facility]) Select Patient?: Yes  Total # of Interventions Recommended: 0  Total # Interventions Accepted: 0  Intervention Severity:   - Level 1 Intervention Present?: No   - Level 2 #: 0   - Level 3 #: 0  Outreach Status: Patient Refused  Care Coordinator Outreach to Patient?: No  Provider Contacted?: No  Waiting on response from: N/A  Time Spent (min): 10    Additional Documentation:

## 2018-11-30 ENCOUNTER — OFFICE VISIT (OUTPATIENT)
Dept: PSYCHOLOGY | Age: 73
End: 2018-11-30
Payer: MEDICARE

## 2018-11-30 DIAGNOSIS — F33.1 MODERATE EPISODE OF RECURRENT MAJOR DEPRESSIVE DISORDER (HCC): Primary | ICD-10-CM

## 2018-11-30 PROCEDURE — 90791 PSYCH DIAGNOSTIC EVALUATION: CPT | Performed by: PSYCHOLOGIST

## 2018-11-30 ASSESSMENT — PATIENT HEALTH QUESTIONNAIRE - PHQ9
SUM OF ALL RESPONSES TO PHQ QUESTIONS 1-9: 14
7. TROUBLE CONCENTRATING ON THINGS, SUCH AS READING THE NEWSPAPER OR WATCHING TELEVISION: 2
SUM OF ALL RESPONSES TO PHQ9 QUESTIONS 1 & 2: 4
5. POOR APPETITE OR OVEREATING: 3
1. LITTLE INTEREST OR PLEASURE IN DOING THINGS: 2
3. TROUBLE FALLING OR STAYING ASLEEP: 0
SUM OF ALL RESPONSES TO PHQ QUESTIONS 1-9: 14
4. FEELING TIRED OR HAVING LITTLE ENERGY: 3
8. MOVING OR SPEAKING SO SLOWLY THAT OTHER PEOPLE COULD HAVE NOTICED. OR THE OPPOSITE, BEING SO FIGETY OR RESTLESS THAT YOU HAVE BEEN MOVING AROUND A LOT MORE THAN USUAL: 1
10. IF YOU CHECKED OFF ANY PROBLEMS, HOW DIFFICULT HAVE THESE PROBLEMS MADE IT FOR YOU TO DO YOUR WORK, TAKE CARE OF THINGS AT HOME, OR GET ALONG WITH OTHER PEOPLE: 1
2. FEELING DOWN, DEPRESSED OR HOPELESS: 2
6. FEELING BAD ABOUT YOURSELF - OR THAT YOU ARE A FAILURE OR HAVE LET YOURSELF OR YOUR FAMILY DOWN: 1
9. THOUGHTS THAT YOU WOULD BE BETTER OFF DEAD, OR OF HURTING YOURSELF: 0

## 2018-11-30 ASSESSMENT — ANXIETY QUESTIONNAIRES
1. FEELING NERVOUS, ANXIOUS, OR ON EDGE: 1-SEVERAL DAYS
6. BECOMING EASILY ANNOYED OR IRRITABLE: 2-OVER HALF THE DAYS
2. NOT BEING ABLE TO STOP OR CONTROL WORRYING: 1-SEVERAL DAYS
5. BEING SO RESTLESS THAT IT IS HARD TO SIT STILL: 0-NOT AT ALL SURE
7. FEELING AFRAID AS IF SOMETHING AWFUL MIGHT HAPPEN: 0-NOT AT ALL SURE
3. WORRYING TOO MUCH ABOUT DIFFERENT THINGS: 1-SEVERAL DAYS
GAD7 TOTAL SCORE: 6
4. TROUBLE RELAXING: 1-SEVERAL DAYS

## 2018-12-06 RX ORDER — FERROUS SULFATE 325(65) MG
TABLET ORAL
Qty: 60 TABLET | Refills: 3 | Status: SHIPPED | OUTPATIENT
Start: 2018-12-06 | End: 2019-09-25

## 2018-12-12 ENCOUNTER — OFFICE VISIT (OUTPATIENT)
Dept: PSYCHOLOGY | Age: 73
End: 2018-12-12
Payer: MEDICARE

## 2018-12-12 DIAGNOSIS — F33.1 MODERATE EPISODE OF RECURRENT MAJOR DEPRESSIVE DISORDER (HCC): Primary | ICD-10-CM

## 2018-12-12 PROCEDURE — 90832 PSYTX W PT 30 MINUTES: CPT | Performed by: PSYCHOLOGIST

## 2018-12-12 RX ORDER — CARVEDILOL 6.25 MG/1
12.5 TABLET ORAL DAILY
Qty: 60 TABLET | Refills: 1 | Status: SHIPPED | OUTPATIENT
Start: 2018-12-12 | End: 2019-03-02 | Stop reason: SDUPTHER

## 2018-12-12 NOTE — PROGRESS NOTES
the lungs every 6 hours as needed for Wheezing      mometasone (ASMANEX) 200 MCG/ACT AERO inhaler Inhale 2 puffs into the lungs 2 times daily      mometasone-formoterol (DULERA) 200-5 MCG/ACT inhaler Inhale 2 puffs into the lungs 2 times daily 1 Inhaler 5    albuterol sulfate HFA (PROAIR HFA) 108 (90 Base) MCG/ACT inhaler Inhale 2 puffs into the lungs every 4 hours as needed for Shortness of Breath 1 Inhaler 3    nicotine (NICODERM CQ) 21 MG/24HR Place 1 patch onto the skin every 24 hours (take off at bedtime) 30 patch 3    lisinopril (PRINIVIL;ZESTRIL) 10 MG tablet Take 1 tablet by mouth daily 90 tablet 3    STOOL SOFTENER 100 MG capsule TAKE ONE CAPSULE BY MOUTH TWICE A DAY 60 capsule 5    ergocalciferol (ERGOCALCIFEROL) 81350 units capsule Take 50,000 Units by mouth once a week      calcitRIOL (ROCALTROL) 0.25 MCG capsule Take 0.25 mcg by mouth every other day On Monday, Wednesday, and friday      lovastatin (MEVACOR) 40 MG tablet TAKE 1 TABLET BY MOUTH EVERY NIGHT AT BEDTIME 90 tablet 1    torsemide (DEMADEX) 100 MG tablet TAKE 1/2 TABLET BY MOUTH EVERY DAY 30 tablet 5    potassium chloride SA (K-DUR;KLOR-CON M) 20 MEQ tablet Take by mouth daily       aspirin 81 MG chewable tablet Take 81 mg by mouth daily       No current facility-administered medications for this visit. Social History:   Social History     Social History    Marital status:      Spouse name: N/A    Number of children: 3    Years of education: N/A     Occupational History           Social History Main Topics    Smoking status: Former Smoker     Packs/day: 0.50     Years: 30.00     Types: Cigarettes     Quit date: 10/13/2018    Smokeless tobacco: Never Used    Alcohol use No    Drug use: No    Sexual activity: Not on file     Other Topics Concern    Not on file     Social History Narrative    He follows [employer] and field sports. 3 children and 5 grandchildren.         TOBACCO:   reports that he quit

## 2018-12-13 ENCOUNTER — TELEPHONE (OUTPATIENT)
Dept: INTERNAL MEDICINE CLINIC | Age: 73
End: 2018-12-13

## 2018-12-13 DIAGNOSIS — M54.50 CHRONIC MIDLINE LOW BACK PAIN WITHOUT SCIATICA: Primary | ICD-10-CM

## 2018-12-13 DIAGNOSIS — J40 BRONCHITIS: ICD-10-CM

## 2018-12-13 DIAGNOSIS — M10.371 ACUTE GOUT DUE TO RENAL IMPAIRMENT INVOLVING RIGHT FOOT: ICD-10-CM

## 2018-12-13 DIAGNOSIS — G89.29 CHRONIC MIDLINE LOW BACK PAIN WITHOUT SCIATICA: Primary | ICD-10-CM

## 2018-12-13 RX ORDER — ACETAMINOPHEN 325 MG/1
650 TABLET ORAL EVERY 4 HOURS PRN
Qty: 120 TABLET | Refills: 3 | Status: SHIPPED | OUTPATIENT
Start: 2018-12-13 | End: 2020-05-15

## 2018-12-14 RX ORDER — PREDNISONE 10 MG/1
10 TABLET ORAL SEE ADMIN INSTRUCTIONS
Qty: 30 TABLET | Refills: 0 | Status: SHIPPED | OUTPATIENT
Start: 2018-12-14 | End: 2018-12-26

## 2018-12-18 ENCOUNTER — OFFICE VISIT (OUTPATIENT)
Dept: PSYCHOLOGY | Age: 73
End: 2018-12-18
Payer: MEDICARE

## 2018-12-18 DIAGNOSIS — F33.1 MODERATE EPISODE OF RECURRENT MAJOR DEPRESSIVE DISORDER (HCC): Primary | ICD-10-CM

## 2018-12-18 PROCEDURE — 90832 PSYTX W PT 30 MINUTES: CPT | Performed by: PSYCHOLOGIST

## 2018-12-18 ASSESSMENT — ANXIETY QUESTIONNAIRES
4. TROUBLE RELAXING: 0-NOT AT ALL SURE
6. BECOMING EASILY ANNOYED OR IRRITABLE: 1-SEVERAL DAYS
7. FEELING AFRAID AS IF SOMETHING AWFUL MIGHT HAPPEN: 0-NOT AT ALL SURE
5. BEING SO RESTLESS THAT IT IS HARD TO SIT STILL: 1-SEVERAL DAYS
3. WORRYING TOO MUCH ABOUT DIFFERENT THINGS: 1-SEVERAL DAYS
GAD7 TOTAL SCORE: 4
1. FEELING NERVOUS, ANXIOUS, OR ON EDGE: 1-SEVERAL DAYS
2. NOT BEING ABLE TO STOP OR CONTROL WORRYING: 0-NOT AT ALL SURE

## 2018-12-18 ASSESSMENT — PATIENT HEALTH QUESTIONNAIRE - PHQ9
2. FEELING DOWN, DEPRESSED OR HOPELESS: 1
4. FEELING TIRED OR HAVING LITTLE ENERGY: 3
SUM OF ALL RESPONSES TO PHQ QUESTIONS 1-9: 11
7. TROUBLE CONCENTRATING ON THINGS, SUCH AS READING THE NEWSPAPER OR WATCHING TELEVISION: 0
8. MOVING OR SPEAKING SO SLOWLY THAT OTHER PEOPLE COULD HAVE NOTICED. OR THE OPPOSITE, BEING SO FIGETY OR RESTLESS THAT YOU HAVE BEEN MOVING AROUND A LOT MORE THAN USUAL: 1
6. FEELING BAD ABOUT YOURSELF - OR THAT YOU ARE A FAILURE OR HAVE LET YOURSELF OR YOUR FAMILY DOWN: 0
SUM OF ALL RESPONSES TO PHQ QUESTIONS 1-9: 11
1. LITTLE INTEREST OR PLEASURE IN DOING THINGS: 2
SUM OF ALL RESPONSES TO PHQ9 QUESTIONS 1 & 2: 3
10. IF YOU CHECKED OFF ANY PROBLEMS, HOW DIFFICULT HAVE THESE PROBLEMS MADE IT FOR YOU TO DO YOUR WORK, TAKE CARE OF THINGS AT HOME, OR GET ALONG WITH OTHER PEOPLE: 0
3. TROUBLE FALLING OR STAYING ASLEEP: 3
9. THOUGHTS THAT YOU WOULD BE BETTER OFF DEAD, OR OF HURTING YOURSELF: 0
5. POOR APPETITE OR OVEREATING: 1

## 2018-12-18 NOTE — PROGRESS NOTES
Behavioral Health Consultation  [address] PsyD  Psychologist  12/18/2018  1:36 PM      Time spent with Patient: 30 minutes  This is patient's third [facility] appointment. Reason for Consult:  Depression, anxiety  Referring Provider: Anderson Francisco MD  [address], [facility]    Feedback given to PCP. S:    Pt reports he is \"doing great. \" Is looking forward to the holidays  Daughter will be visiting. Feeling ok with how things are and not needing to motivate himself to stay busy even though he feels others are pressuring him to do so. Enjoying relaxing. Is still struggling with recent family conflict with sister. Not trying to prove himself anymore and people are having a hard time with the change in his appraoches and behavior. He is content. Does want to be more than content though. Does want to pursue being happy and learn to manage the conflict better.       O:  MSE:    Appearance    alert, cooperative  Sleep disturbance Yes  Fatigue Yes  Loss of pleasure Yes  Impulsive behavior No  Speech    normal rate, normal volume and well articulated  Mood    \"great\"  Affect     Congruent to thought content and mood  Thought Content    intact  Thought Process    linear and goal directed  Associations    logical connections  Insight    Good  Judgment    Intact  Orientation    oriented to person, place, time, and general circumstances  Memory    recent and remote memory intact  Attention/Concentration    intact  Ability to understand instructions Yes  Ability to respond meaningfully Yes  Suicide Assessment    Denies SI      History:    Medications:   Current Outpatient Prescriptions   Medication Sig Dispense Refill    predniSONE (DELTASONE) 10 MG tablet Take 1 tablet by mouth See Admin Instructions for 12 days 4 pills  x 3 days then 3 x 3 days then 2 x 3 days then 1 x 3 days (for gout) 30 tablet 0    acetaminophen (AMINOFEN) 325 MG tablet Take 2 tablets by mouth every 4 hours as

## 2019-01-17 RX ORDER — LOVASTATIN 40 MG/1
TABLET ORAL
Qty: 90 TABLET | Refills: 0 | Status: SHIPPED | OUTPATIENT
Start: 2019-01-17 | End: 2019-07-16 | Stop reason: SDUPTHER

## 2019-02-13 ENCOUNTER — OFFICE VISIT (OUTPATIENT)
Dept: INTERNAL MEDICINE CLINIC | Age: 74
End: 2019-02-13
Payer: MEDICARE

## 2019-02-13 VITALS
WEIGHT: 202 LBS | HEIGHT: 64 IN | RESPIRATION RATE: 16 BRPM | OXYGEN SATURATION: 90 % | HEART RATE: 80 BPM | DIASTOLIC BLOOD PRESSURE: 62 MMHG | BODY MASS INDEX: 34.49 KG/M2 | TEMPERATURE: 98 F | SYSTOLIC BLOOD PRESSURE: 110 MMHG

## 2019-02-13 DIAGNOSIS — I50.22 CHRONIC SYSTOLIC CONGESTIVE HEART FAILURE (HCC): ICD-10-CM

## 2019-02-13 DIAGNOSIS — J06.9 URI, ACUTE: Primary | ICD-10-CM

## 2019-02-13 DIAGNOSIS — I10 ESSENTIAL HYPERTENSION: ICD-10-CM

## 2019-02-13 PROCEDURE — 99214 OFFICE O/P EST MOD 30 MIN: CPT | Performed by: INTERNAL MEDICINE

## 2019-02-13 RX ORDER — LISINOPRIL 5 MG/1
5 TABLET ORAL DAILY
COMMUNITY
End: 2019-12-15 | Stop reason: ALTCHOICE

## 2019-02-13 RX ORDER — DEXTROMETHORPHAN HYDROBROMIDE AND PROMETHAZINE HYDROCHLORIDE 15; 6.25 MG/5ML; MG/5ML
5 SYRUP ORAL EVERY 4 HOURS PRN
Qty: 240 ML | Refills: 1 | Status: SHIPPED | OUTPATIENT
Start: 2019-02-13 | End: 2019-03-06

## 2019-02-13 RX ORDER — AZITHROMYCIN 250 MG/1
250 TABLET, FILM COATED ORAL SEE ADMIN INSTRUCTIONS
Qty: 6 TABLET | Refills: 0 | Status: SHIPPED | OUTPATIENT
Start: 2019-02-13 | End: 2019-06-25 | Stop reason: ALTCHOICE

## 2019-02-13 ASSESSMENT — ENCOUNTER SYMPTOMS
SORE THROAT: 0
BACK PAIN: 0
NAUSEA: 0
ABDOMINAL PAIN: 0
SHORTNESS OF BREATH: 0
COUGH: 1
CHEST TIGHTNESS: 0
EYE REDNESS: 0

## 2019-03-04 RX ORDER — CARVEDILOL 6.25 MG/1
TABLET ORAL
Qty: 60 TABLET | Refills: 0 | Status: SHIPPED | OUTPATIENT
Start: 2019-03-04 | End: 2019-04-25 | Stop reason: SDUPTHER

## 2019-04-25 RX ORDER — CARVEDILOL 6.25 MG/1
TABLET ORAL
Qty: 60 TABLET | Refills: 1 | Status: SHIPPED | OUTPATIENT
Start: 2019-04-25 | End: 2019-07-16 | Stop reason: SDUPTHER

## 2019-06-04 ENCOUNTER — TELEPHONE (OUTPATIENT)
Dept: INTERNAL MEDICINE CLINIC | Age: 74
End: 2019-06-04

## 2019-06-04 RX ORDER — METHYLPREDNISOLONE 4 MG/1
TABLET ORAL
Qty: 1 KIT | Refills: 0 | Status: SHIPPED | OUTPATIENT
Start: 2019-06-04 | End: 2019-06-25 | Stop reason: ALTCHOICE

## 2019-06-13 DIAGNOSIS — E78.2 MIXED HYPERLIPIDEMIA: ICD-10-CM

## 2019-06-13 DIAGNOSIS — D50.0 IRON DEFICIENCY ANEMIA DUE TO CHRONIC BLOOD LOSS: ICD-10-CM

## 2019-06-13 DIAGNOSIS — I10 ESSENTIAL HYPERTENSION: Primary | ICD-10-CM

## 2019-06-17 DIAGNOSIS — D50.0 IRON DEFICIENCY ANEMIA DUE TO CHRONIC BLOOD LOSS: ICD-10-CM

## 2019-06-17 DIAGNOSIS — E78.2 MIXED HYPERLIPIDEMIA: ICD-10-CM

## 2019-06-17 DIAGNOSIS — I10 ESSENTIAL HYPERTENSION: ICD-10-CM

## 2019-06-17 LAB
A/G RATIO: 1.3 (ref 1.1–2.2)
ALBUMIN SERPL-MCNC: 3.9 G/DL (ref 3.4–5)
ALP BLD-CCNC: 87 U/L (ref 40–129)
ALT SERPL-CCNC: 6 U/L (ref 10–40)
ANION GAP SERPL CALCULATED.3IONS-SCNC: 14 MMOL/L (ref 3–16)
AST SERPL-CCNC: 10 U/L (ref 15–37)
BASOPHILS ABSOLUTE: 0 K/UL (ref 0–0.2)
BASOPHILS RELATIVE PERCENT: 0.8 %
BILIRUB SERPL-MCNC: 0.6 MG/DL (ref 0–1)
BUN BLDV-MCNC: 27 MG/DL (ref 7–20)
CALCIUM SERPL-MCNC: 9.6 MG/DL (ref 8.3–10.6)
CHLORIDE BLD-SCNC: 97 MMOL/L (ref 99–110)
CHOLESTEROL, TOTAL: 205 MG/DL (ref 0–199)
CO2: 32 MMOL/L (ref 21–32)
CREAT SERPL-MCNC: 2.1 MG/DL (ref 0.8–1.3)
EOSINOPHILS ABSOLUTE: 0.1 K/UL (ref 0–0.6)
EOSINOPHILS RELATIVE PERCENT: 2.5 %
GFR AFRICAN AMERICAN: 38
GFR NON-AFRICAN AMERICAN: 31
GLOBULIN: 3 G/DL
GLUCOSE BLD-MCNC: 92 MG/DL (ref 70–99)
HCT VFR BLD CALC: 39.6 % (ref 40.5–52.5)
HDLC SERPL-MCNC: 63 MG/DL (ref 40–60)
HEMOGLOBIN: 12.2 G/DL (ref 13.5–17.5)
LDL CHOLESTEROL CALCULATED: 121 MG/DL
LYMPHOCYTES ABSOLUTE: 1 K/UL (ref 1–5.1)
LYMPHOCYTES RELATIVE PERCENT: 20.2 %
MCH RBC QN AUTO: 25.7 PG (ref 26–34)
MCHC RBC AUTO-ENTMCNC: 31 G/DL (ref 31–36)
MCV RBC AUTO: 83.1 FL (ref 80–100)
MONOCYTES ABSOLUTE: 0.7 K/UL (ref 0–1.3)
MONOCYTES RELATIVE PERCENT: 15.1 %
NEUTROPHILS ABSOLUTE: 2.9 K/UL (ref 1.7–7.7)
NEUTROPHILS RELATIVE PERCENT: 61.4 %
PDW BLD-RTO: 17.1 % (ref 12.4–15.4)
PLATELET # BLD: 417 K/UL (ref 135–450)
PMV BLD AUTO: 8.4 FL (ref 5–10.5)
POTASSIUM SERPL-SCNC: 4.6 MMOL/L (ref 3.5–5.1)
RBC # BLD: 4.76 M/UL (ref 4.2–5.9)
SODIUM BLD-SCNC: 143 MMOL/L (ref 136–145)
TOTAL PROTEIN: 6.9 G/DL (ref 6.4–8.2)
TRIGL SERPL-MCNC: 107 MG/DL (ref 0–150)
TSH REFLEX: 0.97 UIU/ML (ref 0.27–4.2)
VLDLC SERPL CALC-MCNC: 21 MG/DL
WBC # BLD: 4.7 K/UL (ref 4–11)

## 2019-06-25 ENCOUNTER — OFFICE VISIT (OUTPATIENT)
Dept: INTERNAL MEDICINE CLINIC | Age: 74
End: 2019-06-25
Payer: MEDICARE

## 2019-06-25 VITALS
BODY MASS INDEX: 31.41 KG/M2 | SYSTOLIC BLOOD PRESSURE: 128 MMHG | OXYGEN SATURATION: 91 % | WEIGHT: 184 LBS | HEART RATE: 81 BPM | DIASTOLIC BLOOD PRESSURE: 84 MMHG | HEIGHT: 64 IN

## 2019-06-25 DIAGNOSIS — N18.30 CHRONIC KIDNEY DISEASE, STAGE III (MODERATE) (HCC): ICD-10-CM

## 2019-06-25 DIAGNOSIS — F33.1 MODERATE EPISODE OF RECURRENT MAJOR DEPRESSIVE DISORDER (HCC): ICD-10-CM

## 2019-06-25 DIAGNOSIS — M10.379 ACUTE GOUT DUE TO RENAL IMPAIRMENT INVOLVING FOOT, UNSPECIFIED LATERALITY: ICD-10-CM

## 2019-06-25 DIAGNOSIS — N18.30 CRF (CHRONIC RENAL FAILURE), STAGE 3 (MODERATE) (HCC): ICD-10-CM

## 2019-06-25 DIAGNOSIS — D50.0 IRON DEFICIENCY ANEMIA DUE TO CHRONIC BLOOD LOSS: ICD-10-CM

## 2019-06-25 DIAGNOSIS — I10 ESSENTIAL HYPERTENSION: Primary | ICD-10-CM

## 2019-06-25 PROCEDURE — 99214 OFFICE O/P EST MOD 30 MIN: CPT | Performed by: INTERNAL MEDICINE

## 2019-06-25 RX ORDER — PREDNISONE 20 MG/1
40 TABLET ORAL DAILY
Qty: 30 TABLET | Refills: 0 | Status: SHIPPED | OUTPATIENT
Start: 2019-06-25 | End: 2019-06-26 | Stop reason: SDUPTHER

## 2019-06-25 ASSESSMENT — ENCOUNTER SYMPTOMS
SHORTNESS OF BREATH: 0
NAUSEA: 0
EYE REDNESS: 0
ABDOMINAL PAIN: 0
BACK PAIN: 0
CHEST TIGHTNESS: 0

## 2019-06-25 NOTE — LETTER
[facility] [address]  [address], [address] [place] [id number]  Phone: [phone]  Fax: [phone]    Min Vincent MD        June 25, 2019     Patient: Nena Vega   YOB: 1945   Date of Visit: 6/25/2019       To Whom It May Concern: It is my medical opinion that Miguel Nunes is unable to perform jury duty at this time. If you have any questions or concerns, please don't hesitate to call.     Sincerely,        Min Vincent MD

## 2019-06-25 NOTE — PROGRESS NOTES
Subjective:      Patient ID: Paradise Collado is a 68 y.o. male    Chief Complaint   Patient presents with    Hypertension     follow up    Other     Patient needing letter stating he cannot attend jury duty       Hypertension   This is a chronic problem. The current episode started more than 1 year ago. The problem is resistant. Pertinent negatives include no chest pain, headaches, neck pain, palpitations, peripheral edema or shortness of breath. Past treatments include beta blockers and ACE inhibitors. The current treatment provides significant improvement. Compliance problems include exercise. Foot Pain    This is a recurrent problem. There has been no history of extremity trauma. The problem occurs intermittently. The quality of the pain is described as aching. The pain is at a severity of 3/10. The pain is moderate. Associated symptoms include joint swelling and stiffness. Pertinent negatives include no fever. The symptoms are aggravated by activity. Treatments tried: oral steroids  The treatment provided moderate relief. Family history includes gout. His past medical history is significant for gout. Depression. His mood has been better recently. He denies increased anxiety. He was worried about having to appear for jury duty. We discussed that he is not really capable of sitting or concentrating for periods of jury duty. He may fall asleep during deliberations which would be inappropriate. Due to his chronic medical condition and sleep disturbance he is not a good person for jury duty. Letter written.     Current Outpatient Medications on File Prior to Visit   Medication Sig Dispense Refill    carvedilol (COREG) 6.25 MG tablet TAKE TWO TABLETS BY MOUTH DAILY 60 tablet 1    lisinopril (PRINIVIL;ZESTRIL) 5 MG tablet Take 5 mg by mouth daily      lovastatin (MEVACOR) 40 MG tablet TAKE ONE TABLET BY MOUTH EVERY NIGHT AT BEDTIME 90 tablet 0    acetaminophen (AMINOFEN) 325 MG tablet Take 2 tablets by mouth every 4 hours as needed for Pain 120 tablet 3    ferrous sulfate 325 (65 Fe) MG tablet TAKE ONE TABLET BY MOUTH TWICE A DAY WITH A MEAL 60 tablet 3    albuterol sulfate  (90 Base) MCG/ACT inhaler Inhale 2 puffs into the lungs every 6 hours as needed for Wheezing      mometasone (ASMANEX) 200 MCG/ACT AERO inhaler Inhale 2 puffs into the lungs 2 times daily      mometasone-formoterol (DULERA) 200-5 MCG/ACT inhaler Inhale 2 puffs into the lungs 2 times daily 1 Inhaler 5    albuterol sulfate HFA (PROAIR HFA) 108 (90 Base) MCG/ACT inhaler Inhale 2 puffs into the lungs every 4 hours as needed for Shortness of Breath 1 Inhaler 3    nicotine (NICODERM CQ) 21 MG/24HR Place 1 patch onto the skin every 24 hours (take off at bedtime) 30 patch 3    STOOL SOFTENER 100 MG capsule TAKE ONE CAPSULE BY MOUTH TWICE A DAY 60 capsule 5    ergocalciferol (ERGOCALCIFEROL) 89758 units capsule Take 50,000 Units by mouth once a week      calcitRIOL (ROCALTROL) 0.25 MCG capsule Take 0.25 mcg by mouth every other day On Monday, Wednesday, and friday      torsemide (DEMADEX) 100 MG tablet TAKE 1/2 TABLET BY MOUTH EVERY DAY 30 tablet 5    potassium chloride SA (K-DUR;KLOR-CON M) 20 MEQ tablet Take by mouth daily       aspirin 81 MG chewable tablet Take 81 mg by mouth daily       No current facility-administered medications on file prior to visit. No Known Allergies    Review of Systems   Constitutional: Negative for fatigue, fever and unexpected weight change. HENT: Negative for hearing loss. Eyes: Negative for redness and visual disturbance. Respiratory: Negative for chest tightness and shortness of breath. Cardiovascular: Negative for chest pain and palpitations. Gastrointestinal: Negative for abdominal pain and nausea. Endocrine: Negative for polydipsia and polyuria. Genitourinary: Negative for dysuria and hematuria. Musculoskeletal: Positive for gout and stiffness.  Negative for arthralgias, back pain and neck pain. Skin: Negative for rash and wound. Neurological: Negative for dizziness and headaches. Hematological: Negative for adenopathy. Does not bruise/bleed easily. Psychiatric/Behavioral: Negative for agitation. The patient is not nervous/anxious. Objective:   Physical Exam   Constitutional: He is oriented to person, place, and time. He appears well-developed and well-nourished. Cardiovascular: Normal rate, regular rhythm and normal heart sounds. No murmur heard. Pulmonary/Chest: Effort normal and breath sounds normal. He has no rales. Musculoskeletal: He exhibits no edema. Neurological: He is alert and oriented to person, place, and time. Skin: Skin is warm and dry. No rash noted. Assessment and plan       1. Essential hypertension  Stable hypertension. Continue current medicine. Check renal panel.  - RENAL FUNCTION PANEL; Future    2. CRF (chronic renal failure), stage 3 (moderate) (HCC)  Chronic renal failure. Stable. Continue current medicine.  - RENAL FUNCTION PANEL; Future  - CBC Auto Differential; Future    3. Iron deficiency anemia due to chronic blood loss  Episode of iron deficiency anemia in the past.  Check labs. - CBC Auto Differential; Future    4. Acute gout due to renal impairment involving foot, unspecified laterality  Intermittent episodes of gout. He gets improvement with steroid. Referral to rheumatologist.  - predniSONE (DELTASONE) 20 MG tablet; Take 2 tablets by mouth daily for 5 days  Dispense: 30 tablet; Refill: 0  - Quinn Russ MD, Rheumatology, [facility]    5. Moderate episode of recurrent major depressive disorder (HCC)  Stable. Continue current medicine. Counseling suggested. 6. Chronic kidney disease, stage III (moderate) (HCC)  Stable. Follow-up blood work showed his creatinine was 2.1. Continue on current medicine.

## 2019-06-26 ENCOUNTER — TELEPHONE (OUTPATIENT)
Dept: INTERNAL MEDICINE CLINIC | Age: 74
End: 2019-06-26

## 2019-06-26 DIAGNOSIS — M10.379 ACUTE GOUT DUE TO RENAL IMPAIRMENT INVOLVING FOOT, UNSPECIFIED LATERALITY: ICD-10-CM

## 2019-06-26 RX ORDER — PREDNISONE 20 MG/1
40 TABLET ORAL DAILY
Qty: 10 TABLET | Refills: 0 | Status: SHIPPED | OUTPATIENT
Start: 2019-06-26 | End: 2019-07-01

## 2019-06-26 NOTE — TELEPHONE ENCOUNTER
Pt states pharmacy would not fill Rx for predniSONE (DELTASONE) 20 MG tablet    Due to clarification needed prescription states 30 tabs but it states to take 2 tabs by mouth for 5 days.  Please clarify

## 2019-06-26 NOTE — TELEPHONE ENCOUNTER
Corrected prescription sent to his [facility].     Orders Placed This Encounter   Medications    predniSONE (DELTASONE) 20 MG tablet     Sig: Take 2 tablets by mouth daily for 5 days     Dispense:  10 tablet     Refill:  0

## 2019-07-16 RX ORDER — CARVEDILOL 6.25 MG/1
TABLET ORAL
Qty: 60 TABLET | Refills: 0 | Status: SHIPPED | OUTPATIENT
Start: 2019-07-16 | End: 2019-08-09 | Stop reason: SDUPTHER

## 2019-07-16 RX ORDER — LOVASTATIN 40 MG/1
TABLET ORAL
Qty: 90 TABLET | Refills: 0 | Status: SHIPPED | OUTPATIENT
Start: 2019-07-16 | End: 2019-08-09 | Stop reason: SDUPTHER

## 2019-07-17 ENCOUNTER — OFFICE VISIT (OUTPATIENT)
Dept: RHEUMATOLOGY | Age: 74
End: 2019-07-17
Payer: MEDICARE

## 2019-07-17 VITALS
WEIGHT: 178 LBS | DIASTOLIC BLOOD PRESSURE: 78 MMHG | SYSTOLIC BLOOD PRESSURE: 110 MMHG | BODY MASS INDEX: 30.39 KG/M2 | HEIGHT: 64 IN

## 2019-07-17 DIAGNOSIS — M10.9 POLYARTICULAR GOUT: Primary | ICD-10-CM

## 2019-07-17 PROCEDURE — 99205 OFFICE O/P NEW HI 60 MIN: CPT | Performed by: INTERNAL MEDICINE

## 2019-07-17 RX ORDER — PREDNISONE 10 MG/1
TABLET ORAL
Qty: 32 TABLET | Refills: 1 | Status: SHIPPED | OUTPATIENT
Start: 2019-07-17 | End: 2019-12-02

## 2019-07-17 RX ORDER — ALLOPURINOL 100 MG/1
TABLET ORAL
Qty: 45 TABLET | Refills: 0 | Status: SHIPPED | OUTPATIENT
Start: 2019-07-17 | End: 2019-08-12 | Stop reason: SDUPTHER

## 2019-07-17 NOTE — PROGRESS NOTES
HERNIA REPAIR Bilateral [id number]    PACEMAKER PLACEMENT       Social History     Socioeconomic History    Marital status:      Spouse name: Not on file    Number of children: 3    Years of education: Not on file    Highest education level: Not on file   Occupational History    Occupation:     Social Needs    Financial resource strain: Not on file    Food insecurity:     Worry: Not on file     Inability: Not on file   [facility] needs:     Medical: Not on file     Non-medical: Not on file   Tobacco Use    Smoking status: Former Smoker     Packs/day: 0.50     Years: 30.00     Pack years: 15.00     Types: Cigarettes     Last attempt to quit: 10/13/2018     Years since quittin.7    Smokeless tobacco: Never Used   Substance and Sexual Activity    Alcohol use: No     Alcohol/week: 0.0 standard drinks    Drug use: No    Sexual activity: Not on file   Lifestyle    Physical activity:     Days per week: Not on file     Minutes per session: Not on file    Stress: Not on file   Relationships    Social connections:     Talks on phone: Not on file     Gets together: Not on file     Attends Buddhist service: Not on file     Active member of club or organization: Not on file     Attends meetings of clubs or organizations: Not on file     Relationship status: Not on file    Intimate partner violence:     Fear of current or ex partner: Not on file     Emotionally abused: Not on file     Physically abused: Not on file     Forced sexual activity: Not on file   Other Topics Concern    Not on file   Social History Narrative    He follows track and field sports. 3 children and 5 grandchildren. No family history of autoimmune diseases    Current Outpatient Medications   Medication Sig Dispense Refill    allopurinol (ZYLOPRIM) 100 MG tablet Take 1/2 tab po daily. 45 tablet 0    predniSONE (DELTASONE) 10 MG tablet 4 pills po daily x 3 days. 3 pills daily x 3 days.  2 pills daily x 3 groomed, normal judgement, alert, appears stated age and cooperative. MKS: Other than asymptomatic osteoarthritic changes across his finger joints, knees, mild bunions bilaterally, I do not appreciate any tender, swollen or inflamed joints, full range of motion in all peripheral joints other than hips have limited abduction bilaterally. No tophaceous deposits. Normal gait, muscle strength in upper and lower extremities. Skin: No rashes, no induration or skin thickening or nodules. No evidence ischemia or deformities noted in digits or nails. HEENT: Normal lids, lacrimal glands and pupils. No oral or nasal ulcers. Salivary glands reveal no evidence of abnormality. External inspection of the ears and nose within normal limits. Neck: No masses or asymmetry. No thyroid enlargement. Chest: Normal effort, clear to auscultation. Heart:  Normal s1/s2, no leg edema. Pacemaker+  Neurologic: normal deep tendon reflexes. No foot or wrist drop. DATA:   Lab Results   Component Value Date    WBC 4.7 06/17/2019    HGB 12.2 (L) 06/17/2019    HCT 39.6 (L) 06/17/2019    MCV 83.1 06/17/2019     06/17/2019         Chemistry        Component Value Date/Time     06/17/2019 1452    K 4.6 06/17/2019 1452    CL 97 (L) 06/17/2019 1452    CO2 32 06/17/2019 1452    BUN 27 (H) 06/17/2019 1452    CREATININE 2.1 (H) 06/17/2019 1452        Component Value Date/Time    CALCIUM 9.6 06/17/2019 1452    ALKPHOS 87 06/17/2019 1452    AST 10 (L) 06/17/2019 1452    ALT 6 (L) 06/17/2019 1452    BILITOT 0.6 06/17/2019 1452            Lab Results   Component Value Date    TSH 1.12 11/09/2018     X-ray right foot-no erosions seen. A/P- See above.

## 2019-08-12 RX ORDER — ALLOPURINOL 100 MG/1
TABLET ORAL
Qty: 45 TABLET | Refills: 0 | Status: SHIPPED | OUTPATIENT
Start: 2019-08-12 | End: 2019-12-02

## 2019-08-12 RX ORDER — CARVEDILOL 6.25 MG/1
TABLET ORAL
Qty: 180 TABLET | Refills: 2 | Status: SHIPPED | OUTPATIENT
Start: 2019-08-12 | End: 2020-05-15

## 2019-08-12 RX ORDER — LOVASTATIN 40 MG/1
TABLET ORAL
Qty: 90 TABLET | Refills: 2 | Status: SHIPPED | OUTPATIENT
Start: 2019-08-12 | End: 2021-10-07 | Stop reason: ALTCHOICE

## 2019-08-14 NOTE — TELEPHONE ENCOUNTER
Patient's wife is calling to report [facility] does not have script ready for Allopurinol. Script was sent on 8/12/19 per Tara Martel. They said the refill was requested too soon. He is on day 27 of a 90 day script. Insurance won't pay for this. Please call wife to advise.

## 2019-08-28 ENCOUNTER — OFFICE VISIT (OUTPATIENT)
Dept: RHEUMATOLOGY | Age: 74
End: 2019-08-28
Payer: MEDICARE

## 2019-08-28 VITALS
BODY MASS INDEX: 27.74 KG/M2 | SYSTOLIC BLOOD PRESSURE: 118 MMHG | WEIGHT: 183 LBS | DIASTOLIC BLOOD PRESSURE: 64 MMHG | HEIGHT: 68 IN

## 2019-08-28 DIAGNOSIS — M10.9 POLYARTICULAR GOUT: ICD-10-CM

## 2019-08-28 DIAGNOSIS — M10.9 POLYARTICULAR GOUT: Primary | ICD-10-CM

## 2019-08-28 LAB
A/G RATIO: 1.4 (ref 1.1–2.2)
ALBUMIN SERPL-MCNC: 3.6 G/DL (ref 3.4–5)
ALP BLD-CCNC: 78 U/L (ref 40–129)
ALT SERPL-CCNC: <5 U/L (ref 10–40)
ANION GAP SERPL CALCULATED.3IONS-SCNC: 12 MMOL/L (ref 3–16)
AST SERPL-CCNC: 10 U/L (ref 15–37)
BILIRUB SERPL-MCNC: 0.6 MG/DL (ref 0–1)
BUN BLDV-MCNC: 27 MG/DL (ref 7–20)
CALCIUM SERPL-MCNC: 9.2 MG/DL (ref 8.3–10.6)
CHLORIDE BLD-SCNC: 98 MMOL/L (ref 99–110)
CO2: 30 MMOL/L (ref 21–32)
CREAT SERPL-MCNC: 2.1 MG/DL (ref 0.8–1.3)
GFR AFRICAN AMERICAN: 38
GFR NON-AFRICAN AMERICAN: 31
GLOBULIN: 2.6 G/DL
GLUCOSE BLD-MCNC: 101 MG/DL (ref 70–99)
POTASSIUM SERPL-SCNC: 4.3 MMOL/L (ref 3.5–5.1)
RHEUMATOID FACTOR: <10 IU/ML
SODIUM BLD-SCNC: 140 MMOL/L (ref 136–145)
TOTAL PROTEIN: 6.2 G/DL (ref 6.4–8.2)
URIC ACID, SERUM: 8.7 MG/DL (ref 3.5–7.2)

## 2019-08-28 PROCEDURE — 99214 OFFICE O/P EST MOD 30 MIN: CPT | Performed by: INTERNAL MEDICINE

## 2019-08-28 RX ORDER — PREDNISONE 10 MG/1
TABLET ORAL
Qty: 30 TABLET | Refills: 1 | Status: SHIPPED | OUTPATIENT
Start: 2019-08-28 | End: 2020-05-15

## 2019-08-28 RX ORDER — ALLOPURINOL 100 MG/1
100 TABLET ORAL DAILY
Qty: 90 TABLET | Refills: 0 | Status: SHIPPED | OUTPATIENT
Start: 2019-08-28 | End: 2022-04-07 | Stop reason: ALTCHOICE

## 2019-08-28 NOTE — PROGRESS NOTES
referral documents and electronic medical records. Copy of consult note is being routed electronically/faxed to referring physician. #######################################################################    Ootacficue-[id number]-mu for polyarticular gout. Multiple other medical comorbidities including cardiomyopathy-AICD/pacemaker, cerebrovascular disease, chronic kidney disease stage IV, hypertension. In last visit-allopurinol was started, was given a spare prescription of prednisone for gout flares. His wife makes med set for him-place both prednisone and allopurinol and his med set. Patient felt wonderful when he was taking prednisone. He did not have any pain, stiffness in any joints. After finishing prednisone, slowly, whole body is getting achy, sore, stiff including finger joints, neck, back, ankles. No overt flares. Does not have any swollen or inflamed joints. Symptoms are persistent throughout the day without any specific morning worsening. Tolerating medications well. No rashes, GI upset, insomnia. All other review of systems are negative.     Past Medical History:   Diagnosis Date    Cardiomyopathy [facility])     Cerebrovascular disease     HTN (hypertension) 8/16/2011    Other and unspecified hyperlipidemia 8/16/2011    Sleep disorder     Tachycardia 8/16/2011    TIA (transient ischemic attack) 8/16/2011     Past Surgical History:   Procedure Laterality Date    CARDIAC [facility]RI[facility]ACEMENT  [facility]    HERNIA REPAIR Bilateral [id number]    PACEMAKER PLACEMENT  2013     Social History     Socioeconomic History    Marital status:      Spouse name: Not on file    Number of children: 3    Years of education: Not on file    Highest education level: Not on file   Occupational History    Occupation:     Social Needs    Financial resource strain: Not on file    Food insecurity:     Worry: Not on file     Inability: Not on file    Transportation needs:

## 2019-08-29 LAB — CYCLIC CITRULLINATED PEPTIDE ANTIBODY IGG: <0.5 U/ML (ref 0–2.9)

## 2019-09-23 ENCOUNTER — TELEPHONE (OUTPATIENT)
Dept: INTERNAL MEDICINE CLINIC | Age: 74
End: 2019-09-23

## 2019-09-23 DIAGNOSIS — J40 BRONCHITIS: Primary | ICD-10-CM

## 2019-09-23 NOTE — TELEPHONE ENCOUNTER
Pt's spouse states she took  to see his Kidney doctor and diagnosed him with Bronchitis pt would like to know if she should bring  in to see Tito Dupree.

## 2019-09-24 RX ORDER — AZITHROMYCIN 250 MG/1
250 TABLET, FILM COATED ORAL SEE ADMIN INSTRUCTIONS
Qty: 6 TABLET | Refills: 0 | Status: SHIPPED | OUTPATIENT
Start: 2019-09-24 | End: 2019-10-01 | Stop reason: ALTCHOICE

## 2019-09-24 RX ORDER — BROMPHENIRAMINE MALEATE, PSEUDOEPHEDRINE HYDROCHLORIDE, AND DEXTROMETHORPHAN HYDROBROMIDE 2; 30; 10 MG/5ML; MG/5ML; MG/5ML
5 SYRUP ORAL 4 TIMES DAILY PRN
Qty: 118 ML | Refills: 2 | Status: SHIPPED | OUTPATIENT
Start: 2019-09-24 | End: 2019-10-24

## 2019-09-30 ENCOUNTER — TELEPHONE (OUTPATIENT)
Dept: INTERNAL MEDICINE CLINIC | Age: 74
End: 2019-09-30

## 2019-09-30 DIAGNOSIS — Z12.5 SCREENING PSA (PROSTATE SPECIFIC ANTIGEN): ICD-10-CM

## 2019-09-30 DIAGNOSIS — Z00.00 WELL ADULT EXAM: ICD-10-CM

## 2019-09-30 DIAGNOSIS — E78.2 MIXED HYPERLIPIDEMIA: Primary | ICD-10-CM

## 2019-11-19 ENCOUNTER — OFFICE VISIT (OUTPATIENT)
Dept: RHEUMATOLOGY | Age: 74
End: 2019-11-19
Payer: MEDICARE

## 2019-11-19 VITALS
SYSTOLIC BLOOD PRESSURE: 114 MMHG | BODY MASS INDEX: 28.04 KG/M2 | DIASTOLIC BLOOD PRESSURE: 68 MMHG | WEIGHT: 185 LBS | HEIGHT: 68 IN

## 2019-11-19 DIAGNOSIS — M10.9 POLYARTICULAR GOUT: Primary | ICD-10-CM

## 2019-11-19 PROCEDURE — 99214 OFFICE O/P EST MOD 30 MIN: CPT | Performed by: INTERNAL MEDICINE

## 2019-12-02 ENCOUNTER — OFFICE VISIT (OUTPATIENT)
Dept: INTERNAL MEDICINE CLINIC | Age: 74
End: 2019-12-02
Payer: MEDICARE

## 2019-12-02 VITALS
OXYGEN SATURATION: 96 % | WEIGHT: 191.8 LBS | HEART RATE: 100 BPM | RESPIRATION RATE: 16 BRPM | BODY MASS INDEX: 30.82 KG/M2 | DIASTOLIC BLOOD PRESSURE: 76 MMHG | HEIGHT: 66 IN | SYSTOLIC BLOOD PRESSURE: 110 MMHG

## 2019-12-02 DIAGNOSIS — E78.2 MIXED HYPERLIPIDEMIA: ICD-10-CM

## 2019-12-02 DIAGNOSIS — N18.30 CRF (CHRONIC RENAL FAILURE), STAGE 3 (MODERATE) (HCC): ICD-10-CM

## 2019-12-02 DIAGNOSIS — N18.30 CHRONIC KIDNEY DISEASE, STAGE III (MODERATE) (HCC): ICD-10-CM

## 2019-12-02 DIAGNOSIS — Z23 NEED FOR INFLUENZA VACCINATION: ICD-10-CM

## 2019-12-02 DIAGNOSIS — I42.0 DILATED CARDIOMYOPATHY (HCC): ICD-10-CM

## 2019-12-02 DIAGNOSIS — Z11.59 NEED FOR HEPATITIS C SCREENING TEST: ICD-10-CM

## 2019-12-02 DIAGNOSIS — Z00.00 ROUTINE GENERAL MEDICAL EXAMINATION AT A HEALTH CARE FACILITY: ICD-10-CM

## 2019-12-02 DIAGNOSIS — Z00.00 WELL ADULT EXAM: Primary | ICD-10-CM

## 2019-12-02 DIAGNOSIS — G47.9 SLEEP DISORDER: ICD-10-CM

## 2019-12-02 DIAGNOSIS — I50.22 CHRONIC SYSTOLIC CONGESTIVE HEART FAILURE (HCC): ICD-10-CM

## 2019-12-02 PROCEDURE — 90653 IIV ADJUVANT VACCINE IM: CPT | Performed by: INTERNAL MEDICINE

## 2019-12-02 PROCEDURE — G0008 ADMIN INFLUENZA VIRUS VAC: HCPCS | Performed by: INTERNAL MEDICINE

## 2019-12-02 PROCEDURE — G0439 PPPS, SUBSEQ VISIT: HCPCS | Performed by: INTERNAL MEDICINE

## 2019-12-02 ASSESSMENT — LIFESTYLE VARIABLES: HOW OFTEN DO YOU HAVE A DRINK CONTAINING ALCOHOL: 0

## 2019-12-02 ASSESSMENT — PATIENT HEALTH QUESTIONNAIRE - PHQ9
SUM OF ALL RESPONSES TO PHQ QUESTIONS 1-9: 2
SUM OF ALL RESPONSES TO PHQ QUESTIONS 1-9: 2

## 2019-12-03 DIAGNOSIS — Z12.5 SCREENING PSA (PROSTATE SPECIFIC ANTIGEN): ICD-10-CM

## 2019-12-03 DIAGNOSIS — Z11.59 NEED FOR HEPATITIS C SCREENING TEST: ICD-10-CM

## 2019-12-03 DIAGNOSIS — E78.2 MIXED HYPERLIPIDEMIA: ICD-10-CM

## 2019-12-03 DIAGNOSIS — M10.9 POLYARTICULAR GOUT: ICD-10-CM

## 2019-12-03 DIAGNOSIS — Z00.00 WELL ADULT EXAM: ICD-10-CM

## 2019-12-03 LAB
A/G RATIO: 1.2 (ref 1.1–2.2)
ALBUMIN SERPL-MCNC: 3 G/DL (ref 3.4–5)
ALP BLD-CCNC: 103 U/L (ref 40–129)
ALT SERPL-CCNC: 10 U/L (ref 10–40)
ANION GAP SERPL CALCULATED.3IONS-SCNC: 17 MMOL/L (ref 3–16)
AST SERPL-CCNC: 14 U/L (ref 15–37)
BASOPHILS ABSOLUTE: 0 K/UL (ref 0–0.2)
BASOPHILS RELATIVE PERCENT: 0.5 %
BILIRUB SERPL-MCNC: 1.5 MG/DL (ref 0–1)
BUN BLDV-MCNC: 34 MG/DL (ref 7–20)
CALCIUM SERPL-MCNC: 9.3 MG/DL (ref 8.3–10.6)
CHLORIDE BLD-SCNC: 97 MMOL/L (ref 99–110)
CHOLESTEROL, TOTAL: 166 MG/DL (ref 0–199)
CO2: 30 MMOL/L (ref 21–32)
CREAT SERPL-MCNC: 2.2 MG/DL (ref 0.8–1.3)
EOSINOPHILS ABSOLUTE: 0 K/UL (ref 0–0.6)
EOSINOPHILS RELATIVE PERCENT: 1.1 %
GFR AFRICAN AMERICAN: 36
GFR NON-AFRICAN AMERICAN: 29
GLOBULIN: 2.6 G/DL
GLUCOSE BLD-MCNC: 123 MG/DL (ref 70–99)
HCT VFR BLD CALC: 44.5 % (ref 40.5–52.5)
HDLC SERPL-MCNC: 68 MG/DL (ref 40–60)
HEMOGLOBIN: 14 G/DL (ref 13.5–17.5)
HEPATITIS C ANTIBODY INTERPRETATION: NORMAL
LDL CHOLESTEROL CALCULATED: 84 MG/DL
LYMPHOCYTES ABSOLUTE: 0.8 K/UL (ref 1–5.1)
LYMPHOCYTES RELATIVE PERCENT: 18.7 %
MCH RBC QN AUTO: 30.3 PG (ref 26–34)
MCHC RBC AUTO-ENTMCNC: 31.4 G/DL (ref 31–36)
MCV RBC AUTO: 96.5 FL (ref 80–100)
MONOCYTES ABSOLUTE: 0.5 K/UL (ref 0–1.3)
MONOCYTES RELATIVE PERCENT: 12.5 %
NEUTROPHILS ABSOLUTE: 2.9 K/UL (ref 1.7–7.7)
NEUTROPHILS RELATIVE PERCENT: 67.2 %
PDW BLD-RTO: 18.7 % (ref 12.4–15.4)
PLATELET # BLD: 203 K/UL (ref 135–450)
PMV BLD AUTO: 9 FL (ref 5–10.5)
POTASSIUM SERPL-SCNC: 4.4 MMOL/L (ref 3.5–5.1)
PROSTATE SPECIFIC ANTIGEN: 1.48 NG/ML (ref 0–4)
RBC # BLD: 4.62 M/UL (ref 4.2–5.9)
SODIUM BLD-SCNC: 144 MMOL/L (ref 136–145)
TOTAL PROTEIN: 5.6 G/DL (ref 6.4–8.2)
TRIGL SERPL-MCNC: 69 MG/DL (ref 0–150)
TSH SERPL DL<=0.05 MIU/L-ACNC: 2.01 UIU/ML (ref 0.27–4.2)
URIC ACID, SERUM: 9 MG/DL (ref 3.5–7.2)
VLDLC SERPL CALC-MCNC: 14 MG/DL
WBC # BLD: 4.4 K/UL (ref 4–11)

## 2019-12-09 ENCOUNTER — TELEPHONE (OUTPATIENT)
Dept: INTERNAL MEDICINE CLINIC | Age: 74
End: 2019-12-09

## 2019-12-17 ENCOUNTER — CARE COORDINATION (OUTPATIENT)
Dept: CASE MANAGEMENT | Age: 74
End: 2019-12-17

## 2019-12-26 ENCOUNTER — OFFICE VISIT (OUTPATIENT)
Dept: INTERNAL MEDICINE CLINIC | Age: 74
End: 2019-12-26
Payer: MEDICARE

## 2019-12-26 VITALS
HEIGHT: 66 IN | OXYGEN SATURATION: 96 % | BODY MASS INDEX: 29.51 KG/M2 | HEART RATE: 90 BPM | WEIGHT: 183.6 LBS | DIASTOLIC BLOOD PRESSURE: 70 MMHG | SYSTOLIC BLOOD PRESSURE: 110 MMHG

## 2019-12-26 DIAGNOSIS — N18.30 CRF (CHRONIC RENAL FAILURE), STAGE 3 (MODERATE) (HCC): Primary | ICD-10-CM

## 2019-12-26 DIAGNOSIS — N18.30 CRF (CHRONIC RENAL FAILURE), STAGE 3 (MODERATE) (HCC): ICD-10-CM

## 2019-12-26 DIAGNOSIS — I47.29 PAROXYSMAL VENTRICULAR TACHYCARDIA: ICD-10-CM

## 2019-12-26 DIAGNOSIS — I50.22 CHRONIC SYSTOLIC CONGESTIVE HEART FAILURE (HCC): ICD-10-CM

## 2019-12-26 DIAGNOSIS — I42.0 DILATED CARDIOMYOPATHY (HCC): ICD-10-CM

## 2019-12-26 LAB
ANION GAP SERPL CALCULATED.3IONS-SCNC: 15 MMOL/L (ref 3–16)
BUN BLDV-MCNC: 33 MG/DL (ref 7–20)
CALCIUM SERPL-MCNC: 9.3 MG/DL (ref 8.3–10.6)
CHLORIDE BLD-SCNC: 102 MMOL/L (ref 99–110)
CO2: 29 MMOL/L (ref 21–32)
CREAT SERPL-MCNC: 2.6 MG/DL (ref 0.8–1.3)
GFR AFRICAN AMERICAN: 29
GFR NON-AFRICAN AMERICAN: 24
GLUCOSE BLD-MCNC: 95 MG/DL (ref 70–99)
POTASSIUM SERPL-SCNC: 4.9 MMOL/L (ref 3.5–5.1)
SODIUM BLD-SCNC: 146 MMOL/L (ref 136–145)

## 2019-12-26 PROCEDURE — 1111F DSCHRG MED/CURRENT MED MERGE: CPT | Performed by: INTERNAL MEDICINE

## 2019-12-26 PROCEDURE — 99213 OFFICE O/P EST LOW 20 MIN: CPT | Performed by: INTERNAL MEDICINE

## 2019-12-26 RX ORDER — AMIODARONE HYDROCHLORIDE 200 MG/1
200 TABLET ORAL DAILY
COMMUNITY
Start: 2019-12-16 | End: 2020-05-18 | Stop reason: SDUPTHER

## 2019-12-26 ASSESSMENT — ENCOUNTER SYMPTOMS
CHEST TIGHTNESS: 0
SHORTNESS OF BREATH: 0
NAUSEA: 0
BACK PAIN: 0
ABDOMINAL PAIN: 0
EYE REDNESS: 0

## 2020-04-02 ENCOUNTER — TELEPHONE (OUTPATIENT)
Dept: INTERNAL MEDICINE CLINIC | Age: 75
End: 2020-04-02

## 2020-04-10 ENCOUNTER — TELEPHONE (OUTPATIENT)
Dept: INTERNAL MEDICINE CLINIC | Age: 75
End: 2020-04-10

## 2020-05-11 ENCOUNTER — TELEPHONE (OUTPATIENT)
Dept: INTERNAL MEDICINE CLINIC | Age: 75
End: 2020-05-11

## 2020-05-11 NOTE — TELEPHONE ENCOUNTER
Farzaneh Palmer called stating patients being discharged Thursday, 05/14/20. Does he need to come into the office for visit or do a virtual/video visit? Please call to advise, you can leave a message if she is not available.      Please call to advise

## 2020-05-11 NOTE — TELEPHONE ENCOUNTER
Please have the nursing home fax me a list of his discharge medication. Please call the patient's wife, Tacy Boxer, and have her schedule a telephone/video visit for follow-up.

## 2020-05-15 ENCOUNTER — VIRTUAL VISIT (OUTPATIENT)
Dept: INTERNAL MEDICINE CLINIC | Age: 75
End: 2020-05-15
Payer: MEDICARE

## 2020-05-15 ENCOUNTER — TELEPHONE (OUTPATIENT)
Dept: INTERNAL MEDICINE CLINIC | Age: 75
End: 2020-05-15

## 2020-05-15 VITALS
HEIGHT: 68 IN | HEART RATE: 103 BPM | BODY MASS INDEX: 25.91 KG/M2 | WEIGHT: 171 LBS | SYSTOLIC BLOOD PRESSURE: 127 MMHG | DIASTOLIC BLOOD PRESSURE: 98 MMHG | TEMPERATURE: 96.6 F

## 2020-05-15 PROCEDURE — 99214 OFFICE O/P EST MOD 30 MIN: CPT | Performed by: INTERNAL MEDICINE

## 2020-05-15 RX ORDER — TORSEMIDE 20 MG/1
20 TABLET ORAL SEE ADMIN INSTRUCTIONS
Qty: 90 TABLET | Refills: 5 | Status: SHIPPED | OUTPATIENT
Start: 2020-05-15 | End: 2022-04-07 | Stop reason: ALTCHOICE

## 2020-05-15 RX ORDER — PANTOPRAZOLE SODIUM 40 MG/1
40 TABLET, DELAYED RELEASE ORAL
Qty: 30 TABLET | Refills: 5 | Status: SHIPPED
Start: 2020-05-15 | End: 2020-09-28 | Stop reason: SINTOL

## 2020-05-15 RX ORDER — ISOSORBIDE DINITRATE 20 MG/1
20 TABLET ORAL 3 TIMES DAILY
COMMUNITY
End: 2021-01-28

## 2020-05-15 RX ORDER — HYDRALAZINE HYDROCHLORIDE 10 MG/1
10 TABLET, FILM COATED ORAL 3 TIMES DAILY
Qty: 90 TABLET | Refills: 5 | Status: SHIPPED | OUTPATIENT
Start: 2020-05-15 | End: 2021-04-07

## 2020-05-15 RX ORDER — MAGNESIUM OXIDE 400 MG/1
400 TABLET ORAL 2 TIMES DAILY
COMMUNITY
End: 2021-01-28

## 2020-05-15 ASSESSMENT — ENCOUNTER SYMPTOMS
CHEST TIGHTNESS: 0
EYE REDNESS: 0
NAUSEA: 0
BACK PAIN: 0
SHORTNESS OF BREATH: 1
ABDOMINAL PAIN: 0

## 2020-05-15 NOTE — PROGRESS NOTES
for redness and visual disturbance. Respiratory: Positive for shortness of breath. Negative for chest tightness. Cardiovascular: Negative for chest pain, palpitations and claudication. Gastrointestinal: Negative for abdominal pain and nausea. Endocrine: Negative for polydipsia and polyuria. Genitourinary: Negative for dysuria and hematuria. Musculoskeletal: Negative for arthralgias, back pain and neck pain. Skin: Negative for rash and wound. Neurological: Negative for dizziness and headaches. Hematological: Negative for adenopathy. Does not bruise/bleed easily. Psychiatric/Behavioral: Positive for sleep disturbance. Negative for agitation. The patient is not nervous/anxious. Objective:    Physical Exam  Constitutional:       Appearance: He is obese. Eyes:      Conjunctiva/sclera: Conjunctivae normal.      Pupils: Pupils are equal, round, and reactive to light. Cardiovascular:      Rate and Rhythm: Normal rate and regular rhythm. Pulmonary:      Breath sounds: No wheezing or rales. Musculoskeletal:      Right lower leg: No edema. Left lower leg: No edema. Skin:     General: Skin is warm and dry. Neurological:      Mental Status: He is alert and oriented to person, place, and time. Psychiatric:         Mood and Affect: Mood normal.       Vitals:    05/15/20 1259   BP: (!) 127/98   Pulse: 103   Temp: 96.6 °F (35.9 °C)       Assessment and plan       1. Chronic systolic congestive heart failure (HCC)  Chronic congestive heart failure. Follow-up with cardiology within the next 1 to 2 weeks. Follow-up appointment here in 1 month. Call with worsening symptoms. Continue medication regularly. Daily weights are suggested. His current dry weight is 171 and he should try to stay within 5 to 10 pounds at this weight. - torsemide (DEMADEX) 20 MG tablet; Take 1 tablet by mouth See Admin Instructions 2 pills daily at 6 am and one daily at noon  Dispense: 90 tablet;  Refill: 5    2. CRF (chronic renal failure), stage 3 (moderate) (HCC)  Chronic renal failure. Continue on diuretic therapy follow-up with nephrologist within the next 1 to 2 weeks. - torsemide (DEMADEX) 20 MG tablet; Take 1 tablet by mouth See Admin Instructions 2 pills daily at 6 am and one daily at noon  Dispense: 90 tablet; Refill: 5    3. Essential hypertension  Improved hypertension. He is not fully under control as of today. He should continue with diuretic therapy and the hydralazine. - hydrALAZINE (APRESOLINE) 10 MG tablet; Take 1 tablet by mouth 3 times daily  Dispense: 90 tablet; Refill: 5    4. Other chronic gastritis without hemorrhage  Stable. Continue on PPI therapy. - pantoprazole (PROTONIX) 40 MG tablet; Take 1 tablet by mouth every morning (before breakfast)  Dispense: 30 tablet; Refill: [id number] Jimbo Cruz is a 69 y.o. male being evaluated by a Virtual Visit (video visit) encounter to address concerns as mentioned above. A caregiver was present when appropriate. Due to this being a TeleHealth encounter (During FAEWI-11 public health emergency), evaluation of the following organ systems was limited: Vitals/Constitutional/EENT/Resp/CV/GI//MS/Neuro/Skin/Heme-Lymph-Imm. Pursuant to the emergency declaration under the [address] [place] [address], [address] [place] [address] authority and the [employer] and [employer] Act, this Virtual Visit was conducted with patient's (and/or legal guardian's) consent, to reduce the patient's risk of exposure to COVID-19 and provide necessary medical care. The patient (and/or legal guardian) has also been advised to contact this office for worsening conditions or problems, and seek emergency medical treatment and/or call 911 if deemed necessary.      Patient identification was verified at the start of the visit: Yes    Total time spent for this encounter: Not billed by time    Services were provided through a video

## 2020-05-18 ENCOUNTER — TELEPHONE (OUTPATIENT)
Dept: INTERNAL MEDICINE CLINIC | Age: 75
End: 2020-05-18

## 2020-05-18 RX ORDER — AMIODARONE HYDROCHLORIDE 200 MG/1
200 TABLET ORAL DAILY
Qty: 30 TABLET | Refills: 5 | Status: SHIPPED | OUTPATIENT
Start: 2020-05-18

## 2020-05-18 NOTE — TELEPHONE ENCOUNTER
Refill of amiodarone sent to his pharmacy.     Orders Placed This Encounter   Medications    amiodarone (CORDARONE) 200 MG tablet     Sig: Take 1 tablet by mouth daily     Dispense:  30 tablet     Refill:  5

## 2020-05-19 ENCOUNTER — VIRTUAL VISIT (OUTPATIENT)
Dept: RHEUMATOLOGY | Age: 75
End: 2020-05-19
Payer: MEDICARE

## 2020-05-19 PROCEDURE — 99214 OFFICE O/P EST MOD 30 MIN: CPT | Performed by: INTERNAL MEDICINE

## 2020-05-21 ENCOUNTER — HOSPITAL ENCOUNTER (OUTPATIENT)
Dept: GENERAL RADIOLOGY | Age: 75
Discharge: HOME | End: 2020-05-21
Payer: MEDICARE

## 2020-05-21 ENCOUNTER — TELEPHONE (OUTPATIENT)
Dept: RHEUMATOLOGY | Age: 75
End: 2020-05-21

## 2020-05-21 ENCOUNTER — HOSPITAL ENCOUNTER (OUTPATIENT)
Age: 75
Discharge: HOME | End: 2020-05-21
Payer: MEDICARE

## 2020-05-21 PROCEDURE — 73030 X-RAY EXAM OF SHOULDER: CPT

## 2020-05-29 ENCOUNTER — OFFICE VISIT (OUTPATIENT)
Dept: ORTHOPEDIC SURGERY | Age: 75
End: 2020-05-29
Payer: MEDICARE

## 2020-05-29 VITALS — BODY MASS INDEX: 27.32 KG/M2 | HEIGHT: 66 IN | WEIGHT: 170 LBS

## 2020-05-29 PROCEDURE — 99203 OFFICE O/P NEW LOW 30 MIN: CPT | Performed by: ORTHOPAEDIC SURGERY

## 2020-06-02 ENCOUNTER — OFFICE VISIT (OUTPATIENT)
Dept: INTERNAL MEDICINE CLINIC | Age: 75
End: 2020-06-02
Payer: MEDICARE

## 2020-06-02 VITALS
DIASTOLIC BLOOD PRESSURE: 68 MMHG | SYSTOLIC BLOOD PRESSURE: 122 MMHG | WEIGHT: 178 LBS | HEIGHT: 66 IN | TEMPERATURE: 97.4 F | BODY MASS INDEX: 28.61 KG/M2

## 2020-06-02 PROCEDURE — 99214 OFFICE O/P EST MOD 30 MIN: CPT | Performed by: INTERNAL MEDICINE

## 2020-06-02 ASSESSMENT — ENCOUNTER SYMPTOMS
BACK PAIN: 0
SHORTNESS OF BREATH: 0
EYE REDNESS: 0
CHEST TIGHTNESS: 0
NAUSEA: 0
ABDOMINAL PAIN: 0

## 2020-06-02 NOTE — PROGRESS NOTES
pain.   Skin: Negative for rash and wound. Neurological: Negative for dizziness and headaches. Hematological: Negative for adenopathy. Does not bruise/bleed easily. Psychiatric/Behavioral: Positive for sleep disturbance (TREY refused CPAP, try [facility] referral). Negative for agitation. The patient is not nervous/anxious. Objective:    Physical Exam  Constitutional:       Appearance: He is obese. Cardiovascular:      Rate and Rhythm: Normal rate and regular rhythm. Pulmonary:      Effort: Pulmonary effort is normal.      Breath sounds: Normal breath sounds. Abdominal:      General: Bowel sounds are normal.      Palpations: Abdomen is soft. Skin:     General: Skin is warm and dry. Neurological:      General: No focal deficit present. Mental Status: He is alert and oriented to person, place, and time. Vitals:    06/02/20 1303   BP: 122/68   Temp: 97.4 °F (36.3 °C)       Assessment and plan       1. Chronic systolic congestive heart failure (HCC)  Stable chronic congestive heart failure. Continue current treatment. Continue daily weights. Avoid salt. Continue current medication schedule. 2. Essential hypertension  Stable hypertension. Continue with antihypertensive therapy. 3. CRF (chronic renal failure), stage 3 (moderate) (HCC)  Chronic renal failure. Continue to follow with nephrology. Continue diuretic and other therapies. 4. Sleep disorder  Unimproved. The patient was offered CPAP therapy. He was noncompliant with using the CPAP machine. The machine was returned to avoid payment for something the patient would not use. He is reporting continuous fatigue. He may be a candidate for inspire therapy and I have referred him to Dr. Brice Packer for possible inspire treatment.   - External Referral To Sleep Medicine

## 2020-06-08 ENCOUNTER — HOSPITAL ENCOUNTER (OUTPATIENT)
Dept: PHYSICAL THERAPY | Age: 75
Setting detail: THERAPIES SERIES
Discharge: HOME | End: 2020-06-08
Payer: MEDICARE

## 2020-06-10 ENCOUNTER — HOSPITAL ENCOUNTER (OUTPATIENT)
Dept: PHYSICAL THERAPY | Age: 75
Setting detail: THERAPIES SERIES
Discharge: HOME | End: 2020-06-10
Payer: MEDICARE

## 2020-06-10 NOTE — FLOWSHEET NOTE
The [address]      Physical Therapy  Cancellation/No-show Note  Patient Name:  Mickey Harper  :  1945   Date:  6/10/2020  Cancelled visits to date: 0  No-shows to date: 2    For today's appointment patient:  []  Cancelled  []  Rescheduled appointment  [x]  No-show     Reason given by patient:  []  Patient ill  []  Conflicting appointment  []  No transportation    []  Conflict with work  []  No reason given  []  Other:     Comments:  Patient did not show for PT initial evaluation appointment for the second day in a row    Electronically signed by: Faisal Abreu PT, OMT-C

## 2020-06-15 ENCOUNTER — TELEPHONE (OUTPATIENT)
Dept: INTERNAL MEDICINE CLINIC | Age: 75
End: 2020-06-15

## 2020-06-15 RX ORDER — FLUOXETINE HYDROCHLORIDE 20 MG/1
20 CAPSULE ORAL DAILY
Qty: 30 CAPSULE | Refills: 5 | Status: SHIPPED
Start: 2020-06-15 | End: 2020-07-31 | Stop reason: ALTCHOICE

## 2020-06-15 NOTE — TELEPHONE ENCOUNTER
Home care states pt is not taking his medication. Home care states pt is depressed and having trouble sleeping pt admitted it to home care. Home care states pt lost will to live.  Home care requesting medication for depression and insomnia

## 2020-06-19 ENCOUNTER — OFFICE VISIT (OUTPATIENT)
Dept: INTERNAL MEDICINE CLINIC | Age: 75
End: 2020-06-19
Payer: MEDICARE

## 2020-06-19 VITALS
DIASTOLIC BLOOD PRESSURE: 84 MMHG | BODY MASS INDEX: 28.54 KG/M2 | HEART RATE: 98 BPM | HEIGHT: 66 IN | TEMPERATURE: 99 F | OXYGEN SATURATION: 97 % | SYSTOLIC BLOOD PRESSURE: 120 MMHG | WEIGHT: 177.6 LBS

## 2020-06-19 PROCEDURE — 99214 OFFICE O/P EST MOD 30 MIN: CPT | Performed by: INTERNAL MEDICINE

## 2020-06-19 ASSESSMENT — ENCOUNTER SYMPTOMS
SHORTNESS OF BREATH: 1
ABDOMINAL PAIN: 0
ABDOMINAL DISTENTION: 1
NAUSEA: 0
EYE REDNESS: 0
CHEST TIGHTNESS: 0
BACK PAIN: 0

## 2020-06-19 NOTE — PROGRESS NOTES
Neurological: Negative for dizziness and headaches. Hematological: Negative for adenopathy. Does not bruise/bleed easily. Psychiatric/Behavioral: Negative for agitation. The patient is not nervous/anxious. Feels chronically tired, seeks to withdrawal from his family and his life, uncooperative with attempts to treat his medical issues. He is here with his daughter and wife today and is agreeable to take a few of his regular pills. Objective:   Physical Exam  Constitutional:       Appearance: He is well-developed. He is obese. Cardiovascular:      Rate and Rhythm: Normal rate and regular rhythm. Heart sounds: Normal heart sounds. No murmur. Pulmonary:      Effort: Pulmonary effort is normal.      Breath sounds: Normal breath sounds. No rales. Abdominal:      General: There is distension. Palpations: There is no mass. Tenderness: There is no abdominal tenderness. There is no guarding or rebound. Hernia: No hernia is present. Skin:     Findings: No rash. Neurological:      General: No focal deficit present. Mental Status: He is alert and oriented to person, place, and time. Psychiatric:      Comments: Sadness, difficulty expressing his feelings, agreeable under pressure to start taking some of his medicines. Assessment and plan       1. Moderate episode of recurrent major depressive disorder [facility])  He admits he has depression. He agrees to start taking the Prozac daily. 2. Other chronic gastritis without hemorrhage  He admits he has a stomach problem. He is willing to take his Protonix (pantoprazole) daily. 3. Chronic systolic congestive heart failure (HCC)  Chronic congestive heart failure which is worsening. I suggested he needs to take his regular medicine. He is not agreeable to taking his usual medicines today.   I am hoping when he starts taking his antidepressant it may be he will become more compliant with taking the medicine he needs.    4. Essential hypertension  Stable. I encouraged him to take his medication. Today he is refusing to take anything but the medicines noted above.

## 2020-06-24 ENCOUNTER — HOSPITAL ENCOUNTER (OUTPATIENT)
Dept: PHYSICAL THERAPY | Age: 75
Setting detail: THERAPIES SERIES
Discharge: HOME | End: 2020-06-24
Payer: MEDICARE

## 2020-06-24 PROCEDURE — 97162 PT EVAL MOD COMPLEX 30 MIN: CPT

## 2020-06-24 PROCEDURE — 97110 THERAPEUTIC EXERCISES: CPT

## 2020-06-24 NOTE — PLAN OF CARE
The [address] [place] [address]      Physical Therapy Certification    Dear Referring Practitioner: Thai Jorge MD,    We had the pleasure of evaluating the following patient for physical therapy services at [address] [place] [address]. A summary of our findings can be found in the initial assessment below. This includes our plan of care. If you have any questions or concerns regarding these findings, please do not hesitate to contact me at the office phone number checked above. Thank you for the referral.       Physician Signature:_______________________________Date:__________________  By signing above (or electronic signature), therapists plan is approved by physician      Patient: Maikel Bray   : 1945   MRN: [id number]  Referring Physician: Referring Practitioner: Thai Jorge MD      Evaluation Date: 2020      Medical Diagnosis Information:  Diagnosis: D30.179O (ICD-10-CM) - Separation of right acromioclavicular joint, initial encounter   Treatment Diagnosis: M25.511 Pain in Right Shoulder                                         Insurance information: PT Insurance Information: Aetna    Precautions/ Contra-indications: R AC joint separation   Latex Allergy:  [x]NO      []YES  Preferred Language for Healthcare:   [x]English       []other:    SUBJECTIVE: Patient stated complaint: Patient is a 69year old male who presents to clinic with reports of right shoulder pain. He states that he fell down the stairs. He states that he didn't initially didn't get treatment because he thought it would get better and had no pain. He states that he saw MD who performed radiographs and a MRI that [facility]. He states that he was prescribed medication but isn't currently taking any pain medication. He denies an injection in the shoulder at this time.  He states that his left shoulder is starting to bother him as a result UE, scap and Core   [x] Manual therapy as indicated for shoulder, scapula and spine to include: Dry Needling/IASTM, STM, PROM, Gr I-IV mobilizations, manipulation. [x] Modalities as needed that may include: thermal agents, E-stim, Biofeedback, US, iontophoresis as indicated  [x] Patient education on joint protection, postural re-education, activity modification, progression of HEP. HEP instruction:   Access Code: [email or web address]   URL: [email or web address]   Date: 06/24/2020   Prepared by: Connor Lucas        GOALS:   Patient stated goal: Reduce pain ; Improve ROM and strength   [] Progressing: [] Met: [] Not Met: [] Adjusted    Therapist goals for Patient:   Short Term Goals: To be achieved in: 2 weeks  1. Independent in HEP and progression per patient tolerance, in order to prevent re-injury. [] Progressing: [] Met: [] Not Met: [] Adjusted   2. Patient will have a decrease in pain to facilitate improvement in movement, function, and ADLs as indicated by Functional Deficits. [] Progressing: [] Met: [] Not Met: [] Adjusted    Long Term Goals: To be achieved in: 8 weeks  1. Disability index score of 40% or less for the UEFS to assist with reaching prior level of function. [] Progressing: [] Met: [] Not Met: [] Adjusted  2. Patient will demonstrate increased AROM to WNL to allow for proper joint functioning as indicated by patients Functional Deficits. [] Progressing: [] Met: [] Not Met: [] Adjusted  3. Patient will demonstrate an increase in strength to good scapular and core control  for UE to allow for proper functional mobility as indicated by patients Functional Deficits. [] Progressing: [] Met: [] Not Met: [] Adjusted  4. Patient will return to Independent functional activities without increased symptoms or restriction.    [] Progressing: [] Met: [] Not Met: [] Adjusted     Electronically signed by:  Connor Lucas, PT, DPT    Note: If patient does not return for scheduled/ recommended follow up visits, this note will serve as a discharge from care along with most recent update on progress.

## 2020-07-02 ENCOUNTER — HOSPITAL ENCOUNTER (OUTPATIENT)
Dept: PHYSICAL THERAPY | Age: 75
Setting detail: THERAPIES SERIES
Discharge: HOME | End: 2020-07-02
Payer: MEDICARE

## 2020-07-02 PROCEDURE — 97110 THERAPEUTIC EXERCISES: CPT

## 2020-07-02 NOTE — FLOWSHEET NOTE
The [address] [place] [address],[address], [address]  Phone: [phone]   Fax:     [phone]    Physical Therapy Daily Treatment Note  Date:  2020    Patient Name:  Elina Hidalgo    :  1945  MRN: [id number]  Restrictions/Precautions:    Medical/Treatment Diagnosis Information:  Diagnosis: S43.101A (ICD-10-CM) - Separation of right acromioclavicular joint, initial encounter  Treatment Diagnosis: M25.511 Pain in Right Shoulder  Insurance/Certification information:  PT Insurance Information: Aetna  Physician Information:  Referring Practitioner: Lara Marte MD  Has the plan of care been signed (Y/N):        []  Yes  [x]  No     Date of Patient follow up with Physician:       Is this a Progress Report:     []  Yes  [x]  No        If Yes:  Date Range for reporting period:  Beginning 20  Ending 20    Progress report will be due (10 Rx or 30 days whichever is less):       Recertification will be due (POC Duration  / 90 days whichever is less): 20         Visit # Insurance Allowable Auth Required   2  7/ [place] []  Yes [x]  No        Functional Scale: UEFS: 70% deficit   Date assessed:  20    Latex Allergy:  [x]NO      []YES  Preferred Language for Healthcare:   [x]English       []other:    Pain level:  0-8/10       SUBJECTIVE:   Patient states that his shoulder still bothers him when he sleeps. He states that when he wakes up his arm is very painful.       OBJECTIVE:   ROM  PROM AROM  Comment    L R L R    Flexion   180 180 + pain present    Abduction   180 180 + pain present    ER   C7 R ear    IR    R PSIS    Other  (cervical)        Other             Strength   L R Comment   Flexion 5 4    Abduction 5 4    ER 5 3    IR 5 4    Supraspinatus      Upper Trap      Lower Trap      Mid Trap      Rhomboids      Biceps 5 5    Triceps      Horizontal Abduction      Horizontal Adduction      Lats        Special Tests  6/24 Results/Comment   Tera Murphy negative   Speeds    OBriens    Apprehension     Load & Shift         Reflexes/Sensation: 6/24              [x]Dermatomes/Myotomes intact               []Reflexes equal and normal bilaterally               []Other:     Joint mobility: 6/24              [x]Normal               []Hypo              []Hyper     Palpation: Complete separation of AC joint  6/24     Functional Mobility/Transfers: Unable to use R UE; Pain with lifting and reaching; unable to lift anything heavy; difficulty and pain with sleeping; difficulty using R UE with dressing  6/24     Posture: Forward head and rounded shoulders  6/24     Gait: (include devices/WB status): WNL  6/24                       [x] Patient history, allergies, meds reviewed. Medical chart reviewed. See intake form. 6/24     Review Of Systems (ROS):  [x]Performed Review of systems (Integumentary, CardioPulmonary, Neurological) by intake and observation. Intake form has been scanned into medical record. Patient has been instructed to contact their primary care physician regarding ROS issues if not already being addressed at this time.   6/24    RESTRICTIONS/PRECAUTIONS: AC joint separation     Exercises/Interventions:   Exercises:  Exercise/Equipment Resistance/Repetitions Other comments   Stretching/PROM     Wand     Table Slides 10x    UE [place]     ER at side 10 sec x 10 Start 7/2   Pulleys     Pendulum        Isometrics     Retraction      30x Start 7/2   Weight shift     Flexion     Abduction     External Rotation     Internal Rotation     Biceps     Triceps          PRE's     Flexion     Abduction     External Rotation     Internal Rotation     Shrugs 3 x 10    EXT     Reverse Flys     Serratus     Horizontal Abd with ER     Biceps 3 x 10; 1# Start 7/2   Triceps     Retraction 3 x 10         Cable Column/Theraband     External Rotation     Internal Rotation     Shrugs     Lats Ext     Flex     Scapular Retraction 3 x 10; green tband Start 7/2   BIC     TRIC     PNF          Dynamic Stability          Plyoback          Manual interventions                     Therapeutic Exercise and NMR EXR  [x] (78171) Provided verbal/tactile cueing for activities related to strengthening, flexibility, endurance, ROM  for improvements in scapular, scapulothoracic and UE control with self care, reaching, carrying, lifting, house/yardwork, driving/computer work.    [] (27842) Provided verbal/tactile cueing for activities related to improving balance, coordination, kinesthetic sense, posture, motor skill, proprioception  to assist with  scapular, scapulothoracic and UE control with self care, reaching, carrying, lifting, house/yardwork, driving/computer work. Therapeutic Activities:    [] (45813 or 14293) Provided verbal/tactile cueing for activities related to improving balance, coordination, kinesthetic sense, posture, motor skill, proprioception and motor activation to allow for proper function of scapular, scapulothoracic and UE control with self care, carrying, lifting, driving/computer work.      Home Exercise Program:    [x] (48890) Reviewed/Progressed HEP activities related to strengthening, flexibility, endurance, ROM of scapular, scapulothoracic and UE control with self care, reaching, carrying, lifting, house/yardwork, driving/computer work  [] (09155) Reviewed/Progressed HEP activities related to improving balance, coordination, kinesthetic sense, posture, motor skill, proprioception of scapular, scapulothoracic and UE control with self care, reaching, carrying, lifting, house/yardwork, driving/computer work      Manual Treatments:  PROM / STM / Oscillations-Mobs:  G-I, II, III, IV (PA's, Inf., Post.)  [] (63936) Provided manual therapy to mobilize soft tissue/joints of cervical/CT, scapular GHJ and UE for the purpose of modulating pain, promoting relaxation,  increasing ROM, reducing/eliminating soft tissue swelling/inflammation/restriction, improving soft tissue extensibility and allowing for proper ROM for normal function with self care, reaching, carrying, lifting, house/yardwork, driving/computer work    Modalities:  CP  10 min 6/24     Charges:  Timed Code Treatment Minutes: 30   Total Treatment Minutes: 35       [] EVAL (LOW) 54000 (typically 20 minutes face-to-face)  [] EVAL (MOD) 00357 (typically 30 minutes face-to-face)  [] EVAL (HIGH) 11054 (typically 45 minutes face-to-face)  [] RE-EVAL      [x] AQ(88541) x 2    [] IONTO  [] NMR (83251) x     [] VASO  [] Manual (81802) x      [] Other:  [] TA x      [] Mech Traction (70039)  [] ES(attended) (62219)      [] ES (un) (32686):     GOALS:  Patient stated goal: Reduce pain ; Improve ROM and strength   [] Progressing: [] Met: [] Not Met: [] Adjusted    Therapist goals for Patient:   Short Term Goals: To be achieved in: 2 weeks  1. Independent in HEP and progression per patient tolerance, in order to prevent re-injury. [] Progressing: [] Met: [] Not Met: [] Adjusted   2. Patient will have a decrease in pain to facilitate improvement in movement, function, and ADLs as indicated by Functional Deficits. [] Progressing: [] Met: [] Not Met: [] Adjusted    Long Term Goals: To be achieved in: 8 weeks  1. Disability index score of 40% or less for the UEFS to assist with reaching prior level of function. [] Progressing: [] Met: [] Not Met: [] Adjusted  2. Patient will demonstrate increased AROM to WNL to allow for proper joint functioning as indicated by patients Functional Deficits. [] Progressing: [] Met: [] Not Met: [] Adjusted  3. Patient will demonstrate an increase in strength to good scapular and core control  for UE to allow for proper functional mobility as indicated by patients Functional Deficits. [] Progressing: [] Met: [] Not Met: [] Adjusted  4.  Patient will return to Independent functional activities without increased symptoms or restriction. [] Progressing: [] Met: [] Not Met: [] Adjusted    Overall Progression Towards Functional goals/ Treatment Progress Update:  [] Patient is progressing as expected towards functional goals listed. [] Progression is slowed due to complexities/Impairments listed. [] Progression has been slowed due to co-morbidities. [x] Plan just implemented, too soon to assess goals progression <30days   [] Goals require adjustment due to lack of progress  [] Patient is not progressing as expected and requires additional follow up with physician  [] Other    Prognosis for POC: [x] Good [] Fair  [] Poor      Patient requires continued skilled intervention: [x] Yes  [] No    Treatment/Activity Tolerance:  [x] Patient able to complete treatment  [] Patient limited by fatigue  [] Patient limited by pain     [] Patient limited by other medical complications  [x] Other: 7/2 Patient tolerated treatment well this session. Attempted shoulder extension with theraband this session but patient reported pain. No pain reported with addition of therabands, ER ROM, gripping, and biceps curls. HEP updated and reviewed with patient. Continue to progress as tolerated. Patient Education:                  PLAN: See eval  [x] Continue per plan of care [] Alter current plan (see comments above)  [] Plan of care initiated [] Hold pending MD visit [] Discharge      Electronically signed by:  Beryle Beans, PT, DPT    Note: If patient does not return for scheduled/ recommended follow up visits, this note will serve as a discharge from care along with most recent update on progress.

## 2020-07-09 ENCOUNTER — HOSPITAL ENCOUNTER (OUTPATIENT)
Dept: PHYSICAL THERAPY | Age: 75
Setting detail: THERAPIES SERIES
Discharge: HOME | End: 2020-07-09
Payer: MEDICARE

## 2020-07-09 NOTE — FLOWSHEET NOTE
The [address]      Physical Therapy  Cancellation/No-show Note  Patient Name:  Moisés Pete  :  1945   Date:  2020  Cancelled visits to date: 0  No-shows to date: 3    For today's appointment patient:  []  Cancelled  []  Rescheduled appointment  [x]  No-show     Reason given by patient:  []  Patient ill  []  Conflicting appointment  []  No transportation    []  Conflict with work  [x]  No reason given  []  Other:     Comments:      Electronically signed by: Miguel Gonzales, PT, DPT

## 2020-07-10 ENCOUNTER — OFFICE VISIT (OUTPATIENT)
Dept: ORTHOPEDIC SURGERY | Age: 75
End: 2020-07-10
Payer: MEDICARE

## 2020-07-10 PROCEDURE — 99213 OFFICE O/P EST LOW 20 MIN: CPT | Performed by: ORTHOPAEDIC SURGERY

## 2020-07-10 NOTE — PROGRESS NOTES
Chief Complaint    Follow-up (right shoulder)      History of Present Illness:  Lamberto Whitmore is a 69 y.o. male. He is here today for follow-up for his right shoulder. He is being treated for a grade 3 AC joint sprain. Doing okay at this time. Does still have pain over the top of his shoulder. He did go to one therapy appointment he states that was helpful. Medical History:  Patient's medications, allergies, past medical, surgical, social and family histories were reviewed and updated as appropriate. Review of Systems:  Pertinent items are noted in HPI  Review of systems reviewed from Patient History Form dated on 7/10/20 and available in the patient's chart under the Media tab. Vital Signs: There were no vitals taken for this visit. General Exam:   Constitutional: Patient is adequately groomed with no evidence of malnutrition  DTRs: Deep tendon reflexes are intact  Mental Status: The patient is oriented to time, place and person. The patient's mood and affect are appropriate. Shoulder Examination:    Inspection: He does demonstrate significant prominence of the distal clavicle at the [facility] joint     Palpation: No significant tenderness palpation over the [facility] joint today. There is palpable crepitus over the [facility] joint through range of motion     Range of Motion:  He does have full active range of motion of his right shoulder     Strength: Strength is 4 out of 5 with rotator cuff testing in each direction     Special Tests: There is pain weakness with [place]'s active compression testing.     Skin: There are no rashes, ulcerations or lesions.     Gait: Normal    Additional Comments:       Additional Examinations:         Left Upper Extremity: Examination of the left upper extremity does not show any tenderness, deformity or injury. Range of motion is unremarkable. There is no gross instability. There are no rashes, ulcerations or lesions.   Strength and tone are

## 2020-07-23 ENCOUNTER — HOSPITAL ENCOUNTER (OUTPATIENT)
Dept: PHYSICAL THERAPY | Age: 75
Setting detail: THERAPIES SERIES
Discharge: HOME | End: 2020-07-23
Payer: MEDICARE

## 2020-07-23 PROCEDURE — 97110 THERAPEUTIC EXERCISES: CPT

## 2020-07-23 NOTE — FLOWSHEET NOTE
Rotation 3 x 10; green tband Start 7/23   Shrugs     Lats     Ext     Flex     Scapular Retraction 3 x 10; green tband Start 7/2   BIC     TRIC     PNF          Dynamic Stability          Plyoback          Manual interventions                     Therapeutic Exercise and NMR EXR  [x] (61538) Provided verbal/tactile cueing for activities related to strengthening, flexibility, endurance, ROM  for improvements in scapular, scapulothoracic and UE control with self care, reaching, carrying, lifting, house/yardwork, driving/computer work.    [] (35055) Provided verbal/tactile cueing for activities related to improving balance, coordination, kinesthetic sense, posture, motor skill, proprioception  to assist with  scapular, scapulothoracic and UE control with self care, reaching, carrying, lifting, house/yardwork, driving/computer work. Therapeutic Activities:    [] (06294 or 80904) Provided verbal/tactile cueing for activities related to improving balance, coordination, kinesthetic sense, posture, motor skill, proprioception and motor activation to allow for proper function of scapular, scapulothoracic and UE control with self care, carrying, lifting, driving/computer work. Home Exercise Program:    [x] (09955) Reviewed/Progressed HEP activities related to strengthening, flexibility, endurance, ROM of scapular, scapulothoracic and UE control with self care, reaching, carrying, lifting, house/yardwork, driving/computer work  [] (85813) Reviewed/Progressed HEP activities related to improving balance, coordination, kinesthetic sense, posture, motor skill, proprioception of scapular, scapulothoracic and UE control with self care, reaching, carrying, lifting, house/yardwork, driving/computer work    Access Code: [email or web address]   URL: [email or web address]   Date: 07/23/2020   Prepared by: Lisa Estrella     Manual Treatments:  PROM / STM / Oscillations-Mobs:  G-I, II, III, IV (PA's, Inf., Post.)  [] (75714) Provided indicated by patients Functional Deficits. [] Progressing: [] Met: [] Not Met: [] Adjusted  4. Patient will return to Independent functional activities without increased symptoms or restriction. [] Progressing: [] Met: [] Not Met: [] Adjusted    Overall Progression Towards Functional goals/ Treatment Progress Update:  [] Patient is progressing as expected towards functional goals listed. [] Progression is slowed due to complexities/Impairments listed. [] Progression has been slowed due to co-morbidities. [x] Plan just implemented, too soon to assess goals progression <30days   [] Goals require adjustment due to lack of progress  [] Patient is not progressing as expected and requires additional follow up with physician  [] Other    Prognosis for POC: [x] Good [] Fair  [] Poor      Patient requires continued skilled intervention: [x] Yes  [] No    Treatment/Activity Tolerance:  [x] Patient able to complete treatment  [] Patient limited by fatigue  [] Patient limited by pain     [] Patient limited by other medical complications  [x] Other: 7/23 Patient tolerated treatment well this session. Reported no pain with addition of therabands strengthening exercises; improvement noted with activity. HEP updated and reviewed with patient. Continue to progress as tolerated. Patient Education:                  PLAN: See eval  [x] Continue per plan of care [] Alter current plan (see comments above)  [] Plan of care initiated [] Hold pending MD visit [] Discharge      Electronically signed by:  Aileen Coronado PT, DPT    Note: If patient does not return for scheduled/ recommended follow up visits, this note will serve as a discharge from care along with most recent update on progress.

## 2020-07-30 ENCOUNTER — HOSPITAL ENCOUNTER (OUTPATIENT)
Dept: PHYSICAL THERAPY | Age: 75
Setting detail: THERAPIES SERIES
Discharge: HOME | End: 2020-07-30
Payer: MEDICARE

## 2020-07-30 LAB
ANION GAP SERPL CALCULATED.3IONS-SCNC: 16 MMOL/L (ref 6–18)
BUN BLDV-MCNC: 38 MG/DL (ref 8–26)
CALCIUM SERPL-MCNC: 8.8 MG/DL (ref 8.5–10.5)
CHLORIDE BLD-SCNC: 100 MEQ/L (ref 101–111)
CO2: 24 MMOL/L (ref 24–36)
CREAT SERPL-MCNC: 4.37 MG/DL (ref 0.64–1.27)
GFR AFRICAN AMERICAN: 14 ML/MIN/1.73 M2
GFR NON-AFRICAN AMERICAN: 12 ML/MIN/1.73 M2
GLUCOSE BLD-MCNC: 136 MG/DL (ref 70–99)
POTASSIUM SERPL-SCNC: 3.8 MEQ/L (ref 3.6–5.1)
SODIUM BLD-SCNC: 140 MEQ/L (ref 135–145)

## 2020-07-30 PROCEDURE — 97110 THERAPEUTIC EXERCISES: CPT

## 2020-07-30 PROCEDURE — 97112 NEUROMUSCULAR REEDUCATION: CPT

## 2020-07-31 ENCOUNTER — OFFICE VISIT (OUTPATIENT)
Dept: INTERNAL MEDICINE CLINIC | Age: 75
End: 2020-07-31
Payer: MEDICARE

## 2020-07-31 VITALS
HEART RATE: 94 BPM | HEIGHT: 66 IN | OXYGEN SATURATION: 98 % | BODY MASS INDEX: 25.71 KG/M2 | WEIGHT: 160 LBS | SYSTOLIC BLOOD PRESSURE: 120 MMHG | TEMPERATURE: 98.2 F | DIASTOLIC BLOOD PRESSURE: 80 MMHG

## 2020-07-31 PROBLEM — N18.4 CHRONIC KIDNEY DISEASE (CKD), STAGE IV (SEVERE) (HCC): Status: ACTIVE | Noted: 2020-07-31

## 2020-07-31 PROCEDURE — 99213 OFFICE O/P EST LOW 20 MIN: CPT | Performed by: INTERNAL MEDICINE

## 2020-07-31 ASSESSMENT — ENCOUNTER SYMPTOMS
CHEST TIGHTNESS: 0
ABDOMINAL PAIN: 0
BACK PAIN: 0
NAUSEA: 0
SHORTNESS OF BREATH: 0
EYE REDNESS: 0

## 2020-07-31 NOTE — PROGRESS NOTES
Subjective:      Patient ID: Sabina Hernandez is a 69 y.o. male    Chief Complaint   Patient presents with    Chronic Renal Failure     6 week f/u        HPI     Depression  The patient is here with his wife. They both seem to indicate he seems slightly better. Less depressed and more interactive. The patient himself is less sure that he is doing better. He is however reporting a side effect of a queasy stomach with 2-3 liquid bowel movements per day. This could be a side effect of the fluoxetine. I decided we should try to switch him from fluoxetine to sertraline. Recheck him in 2 to 3 months. He does have a follow-up exam with his kidney doctor. His recent lab shows his creatinine is up to 4.37. He was asking about how long he has until the dialysis. I told him this was not a certain number but could change depending on how stable his condition remains.     Current Outpatient Medications on File Prior to Visit   Medication Sig Dispense Refill    amiodarone (CORDARONE) 200 MG tablet Take 1 tablet by mouth daily 30 tablet 5    torsemide (DEMADEX) 20 MG tablet Take 1 tablet by mouth See Admin Instructions 2 pills daily at 6 am and one daily at noon 90 tablet 5    isosorbide dinitrate (ISORDIL) 20 MG tablet Take 20 mg by mouth 3 times daily      apixaban (ELIQUIS) 2.5 MG TABS tablet Take by mouth 2 times daily      pantoprazole (PROTONIX) 40 MG tablet Take 1 tablet by mouth every morning (before breakfast) 30 tablet 5    hydrALAZINE (APRESOLINE) 10 MG tablet Take 1 tablet by mouth 3 times daily 90 tablet 5    allopurinol (ZYLOPRIM) 100 MG tablet Take 1 tablet by mouth daily 90 tablet 0    lovastatin (MEVACOR) 40 MG tablet TAKE ONE TABLET BY MOUTH EVERY NIGHT AT BEDTIME 90 tablet 2    ferrous sulfate 325 (65 Fe) MG tablet TAKE ONE TABLET BY MOUTH TWICE A DAY WITH A MEAL 60 tablet 3    magnesium oxide (MAG-OX) 400 MG tablet Take 400 mg by mouth 2 times daily      albuterol sulfate HFA (PROAIR service: Not on file     Active member of club or organization: Not on file     Attends meetings of clubs or organizations: Not on file     Relationship status: Not on file    Intimate partner violence     Fear of current or ex partner: Not on file     Emotionally abused: Not on file     Physically abused: Not on file     Forced sexual activity: Not on file   Other Topics Concern    Not on file   Social History Narrative    He follows track and field sports. 3 children and 5 grandchildren. Family History   Problem Relation Age of Onset    Osteoarthritis Father     Hypertension Father     Cancer Father         prostate, rectal    Stroke Father     ADHD Sister      Immunization History   Administered Date(s) Administered    Influenza, High Dose (Fluzone 65 yrs and older) 12/02/2016, 11/08/2017, 10/13/2018    Influenza, Triv, inactivated, subunit, adjuvanted, IM (Fluad 65 yrs and older) 12/02/2019    Pneumococcal Conjugate 13-valent ([id number]) 03/08/2016    Pneumococcal Polysaccharide ([id number]) 03/07/2018       Review of Systems   Constitutional: Positive for fatigue. Negative for fever and unexpected weight change. HENT: Negative for hearing loss. Eyes: Negative for redness and visual disturbance. Respiratory: Negative for chest tightness and shortness of breath. Cardiovascular: Negative for chest pain and palpitations. Gastrointestinal: Negative for abdominal pain and nausea. Endocrine: Negative for polydipsia and polyuria. Genitourinary: Negative for dysuria and hematuria. Musculoskeletal: Negative for arthralgias, back pain and neck pain. Skin: Negative for rash and wound. Allergic/Immunologic: Negative for environmental allergies. Neurological: Negative for dizziness and headaches. Hematological: Negative for adenopathy. Does not bruise/bleed easily. Psychiatric/Behavioral: Positive for sleep disturbance (He generally does not wear his CPAP for very long. ).  Negative for agitation. The patient is not nervous/anxious. Objective:    Physical Exam  Constitutional:       Appearance: He is well-developed. Comments: Tired looking. Cardiovascular:      Rate and Rhythm: Normal rate and regular rhythm. Heart sounds: Normal heart sounds. No murmur. Pulmonary:      Effort: Pulmonary effort is normal.      Breath sounds: Normal breath sounds. No rales. Skin:     Findings: No rash. Neurological:      Mental Status: He is alert and oriented to person, place, and time. Psychiatric:      Comments: Slightly irritable, cooperative with questions. Vitals:    07/31/20 1101   BP: 120/80   Pulse: 94   Temp: 98.2 °F (36.8 °C)   SpO2: 98%       Assessment and plan       1. Moderate episode of recurrent major depressive disorder (HCC)  Slight improvement. Side effects of some loose bowels. Try sertraline in place of fluoxetine. - sertraline (ZOLOFT) 50 MG tablet; Take 1 tablet by mouth daily  Dispense: 30 tablet; Refill: 5    2. Chronic kidney disease (CKD), stage IV (severe) (HCC)  Slightly increased chronic renal failure. Creatinine 4.37. Nephrology appointment soon. 3. Paroxysmal ventricular tachycardia (HCC)  Stable. No evidence of active tachycardia.

## 2020-08-06 ENCOUNTER — HOSPITAL ENCOUNTER (OUTPATIENT)
Dept: PHYSICAL THERAPY | Age: 75
Setting detail: THERAPIES SERIES
Discharge: HOME | End: 2020-08-06
Payer: MEDICARE

## 2020-08-06 PROCEDURE — 97110 THERAPEUTIC EXERCISES: CPT

## 2020-08-06 PROCEDURE — 97530 THERAPEUTIC ACTIVITIES: CPT

## 2020-08-06 NOTE — FLOWSHEET NOTE
modulating pain, promoting relaxation,  increasing ROM, reducing/eliminating soft tissue swelling/inflammation/restriction, improving soft tissue extensibility and allowing for proper ROM for normal function with self care, reaching, carrying, lifting, house/yardwork, driving/computer work    Modalities:  CP  10 min 7/23     Charges:  Timed Code Treatment Minutes: 25   Total Treatment Minutes: 29  3:26-3:55       [] EVAL (LOW) 91931 (typically 20 minutes face-to-face)  [] EVAL (MOD) 29081 (typically 30 minutes face-to-face)  [] EVAL (HIGH) 53003 (typically 45 minutes face-to-face)  [] RE-EVAL      [x] ES(02474) x 1    [] IONTO  [] NMR (68976) x     [] VASO  [] Manual (19764) x      [] Other:  [x] TA x 1      [] Mech Traction (48621)  [] ES(attended) (86754)      [] ES (un) (65888):     GOALS:  Patient stated goal: Reduce pain ; Improve ROM and strength   [] Progressing: [] Met: [] Not Met: [] Adjusted    Therapist goals for Patient:   Short Term Goals: To be achieved in: 2 weeks  1. Independent in HEP and progression per patient tolerance, in order to prevent re-injury. [] Progressing: [] Met: [] Not Met: [] Adjusted   2. Patient will have a decrease in pain to facilitate improvement in movement, function, and ADLs as indicated by Functional Deficits. [] Progressing: [] Met: [] Not Met: [] Adjusted    Long Term Goals: To be achieved in: 8 weeks  1. Disability index score of 40% or less for the UEFS to assist with reaching prior level of function. [] Progressing: [] Met: [] Not Met: [] Adjusted  2. Patient will demonstrate increased AROM to WNL to allow for proper joint functioning as indicated by patients Functional Deficits. [] Progressing: [] Met: [] Not Met: [] Adjusted  3. Patient will demonstrate an increase in strength to good scapular and core control  for UE to allow for proper functional mobility as indicated by patients Functional Deficits. [] Progressing: [] Met: [] Not Met: [] Adjusted  4. Patient will return to Independent functional activities without increased symptoms or restriction. [] Progressing: [] Met: [] Not Met: [] Adjusted    Overall Progression Towards Functional goals/ Treatment Progress Update:  [] Patient is progressing as expected towards functional goals listed. [] Progression is slowed due to complexities/Impairments listed. [] Progression has been slowed due to co-morbidities. [x] Plan just implemented, too soon to assess goals progression <30days   [] Goals require adjustment due to lack of progress  [] Patient is not progressing as expected and requires additional follow up with physician  [] Other    Prognosis for POC: [x] Good [] Fair  [] Poor      Patient requires continued skilled intervention: [x] Yes  [] No    Treatment/Activity Tolerance:  [x] Patient able to complete treatment  [] Patient limited by fatigue  [] Patient limited by pain     [] Patient limited by other medical complications  [x] Other: 8/6 Patient tolerated treatment well this session. Continue to progress as tolerated. Patient Education:                  PLAN: See eval  [x] Continue per plan of care [] Alter current plan (see comments above)  [] Plan of care initiated [] Hold pending MD visit [] Discharge      Electronically signed by:  Linnette Ladd, PT, DPT    Note: If patient does not return for scheduled/ recommended follow up visits, this note will serve as a discharge from care along with most recent update on progress.

## 2020-08-13 ENCOUNTER — HOSPITAL ENCOUNTER (OUTPATIENT)
Dept: PHYSICAL THERAPY | Age: 75
Setting detail: THERAPIES SERIES
Discharge: HOME | End: 2020-08-13
Payer: MEDICARE

## 2020-08-13 PROCEDURE — 97110 THERAPEUTIC EXERCISES: CPT

## 2020-08-13 PROCEDURE — 97530 THERAPEUTIC ACTIVITIES: CPT

## 2020-08-13 NOTE — FLOWSHEET NOTE
The [address] [place] [address],[facility] [address],  [address]  Phone: [phone]   Fax:     [phone]    Physical Therapy Daily Treatment Note  Date:  2020    Patient Name:  Jorge Valenzuela    :  1945  MRN: [id number]  Restrictions/Precautions:    Medical/Treatment Diagnosis Information:  Diagnosis: S43.101A (ICD-10-CM) - Separation of right acromioclavicular joint, initial encounter  Treatment Diagnosis: M25.511 Pain in Right Shoulder  Insurance/Certification information:  PT Insurance Information: Aetna  Physician Information:  Referring Practitioner: Maria Luz Mcgill MD  Has the plan of care been signed (Y/N):        []  Yes  [x]  No     Date of Patient follow up with Physician:       Is this a Progress Report:     []  Yes  [x]  No        If Yes:  Date Range for reporting period:  Beginning 20  Ending 20    Progress report will be due (10 Rx or 30 days whichever is less):       Recertification will be due (POC Duration  / 90 days whichever is less): 20         Visit # Insurance Allowable Auth Required    [place] []  Yes [x]  No        Functional Scale: UEFS: 70% deficit   Date assessed:  20    Latex Allergy:  [x]NO      []YES  Preferred Language for Healthcare:   [x]English       []other:    Pain level:  4/10  8/13     SUBJECTIVE:   Patient states that both of his shoulders bother him. He states that he still has trouble sleeping.      OBJECTIVE:   ROM  PROM AROM  Comment    L R L R    Flexion   180 180 + pain present    Abduction   180 180 + pain present    ER   C7 R ear    IR    R PSIS    Other  (cervical)        Other             Strength   L R Comment   Flexion 5 4    Abduction 5 4    ER 5 3    IR 5 4    Supraspinatus      Upper Trap      Lower Trap      Mid Trap      Rhomboids      Biceps 5 5    Triceps      Horizontal Abduction      Horizontal Adduction      Lats Shrugs     Lats     Ext     Flex     Scapular Retraction 3 x 10; green tband Start 7/2   BIC     TRIC     PNF     No money 3 x 10; red tband ^8/6   Dynamic Stability          Plyoback          Manual interventions                     Therapeutic Exercise and NMR EXR  [x] (40374) Provided verbal/tactile cueing for activities related to strengthening, flexibility, endurance, ROM  for improvements in scapular, scapulothoracic and UE control with self care, reaching, carrying, lifting, house/yardwork, driving/computer work. [x] (61543) Provided verbal/tactile cueing for activities related to improving balance, coordination, kinesthetic sense, posture, motor skill, proprioception  to assist with  scapular, scapulothoracic and UE control with self care, reaching, carrying, lifting, house/yardwork, driving/computer work. Therapeutic Activities:    [x] (80032 or 92494) Provided verbal/tactile cueing for activities related to improving balance, coordination, kinesthetic sense, posture, motor skill, proprioception and motor activation to allow for proper function of scapular, scapulothoracic and UE control with self care, carrying, lifting, driving/computer work. Home Exercise Program:    [x] (31790) Reviewed/Progressed HEP activities related to strengthening, flexibility, endurance, ROM of scapular, scapulothoracic and UE control with self care, reaching, carrying, lifting, house/yardwork, driving/computer work  [] (13936) Reviewed/Progressed HEP activities related to improving balance, coordination, kinesthetic sense, posture, motor skill, proprioception of scapular, scapulothoracic and UE control with self care, reaching, carrying, lifting, house/yardwork, driving/computer work    Access Code: [email or web address]   URL: [email or web address]   Date: 07/23/2020   Prepared by: Margart Meckel     Manual Treatments:  PROM / STM / Oscillations-Mobs:  G-I, II, III, IV (PA's, Inf., Post.)  [] (88352) Provided manual therapy to mobilize soft tissue/joints of cervical/CT, scapular GHJ and UE for the purpose of modulating pain, promoting relaxation,  increasing ROM, reducing/eliminating soft tissue swelling/inflammation/restriction, improving soft tissue extensibility and allowing for proper ROM for normal function with self care, reaching, carrying, lifting, house/yardwork, driving/computer work    Modalities:  CP  10 min 7/23     Charges:  Timed Code Treatment Minutes: 25   Total Treatment Minutes: 37  4:15-4:52       [] EVAL (LOW) 54420 (typically 20 minutes face-to-face)  [] EVAL (MOD) 27381 (typically 30 minutes face-to-face)  [] EVAL (HIGH) 83433 (typically 45 minutes face-to-face)  [] RE-EVAL      [x] RE(62030) x 1    [] IONTO  [] NMR (84416) x     [] VASO  [] Manual (63594) x      [] Other:  [x] TA x 1      [] Mech Traction (45356)  [] ES(attended) (26111)      [] ES (un) (17418):     GOALS:  Patient stated goal: Reduce pain ; Improve ROM and strength   [] Progressing: [] Met: [] Not Met: [] Adjusted    Therapist goals for Patient:   Short Term Goals: To be achieved in: 2 weeks  1. Independent in HEP and progression per patient tolerance, in order to prevent re-injury. [] Progressing: [] Met: [] Not Met: [] Adjusted   2. Patient will have a decrease in pain to facilitate improvement in movement, function, and ADLs as indicated by Functional Deficits. [] Progressing: [] Met: [] Not Met: [] Adjusted    Long Term Goals: To be achieved in: 8 weeks  1. Disability index score of 40% or less for the UEFS to assist with reaching prior level of function. [] Progressing: [] Met: [] Not Met: [] Adjusted  2. Patient will demonstrate increased AROM to WNL to allow for proper joint functioning as indicated by patients Functional Deficits. [] Progressing: [] Met: [] Not Met: [] Adjusted  3.  Patient will demonstrate an increase in strength to good scapular and core control  for UE to allow for proper functional mobility as indicated by patients Functional Deficits. [] Progressing: [] Met: [] Not Met: [] Adjusted  4. Patient will return to Independent functional activities without increased symptoms or restriction. [] Progressing: [] Met: [] Not Met: [] Adjusted    Overall Progression Towards Functional goals/ Treatment Progress Update:  [] Patient is progressing as expected towards functional goals listed. [] Progression is slowed due to complexities/Impairments listed. [] Progression has been slowed due to co-morbidities. [x] Plan just implemented, too soon to assess goals progression <30days   [] Goals require adjustment due to lack of progress  [] Patient is not progressing as expected and requires additional follow up with physician  [] Other    Prognosis for POC: [x] Good [] Fair  [] Poor      Patient requires continued skilled intervention: [x] Yes  [] No    Treatment/Activity Tolerance:  [x] Patient able to complete treatment  [] Patient limited by fatigue  [] Patient limited by pain     [] Patient limited by other medical complications  [x] Other: 8/13 Patient tolerated treatment well this session. Continue to progress as tolerated. Patient Education:                  PLAN: See eval  [x] Continue per plan of care [] Alter current plan (see comments above)  [] Plan of care initiated [] Hold pending MD visit [] Discharge      Electronically signed by:  Rebekah Samaniego PT, DPT    Note: If patient does not return for scheduled/ recommended follow up visits, this note will serve as a discharge from care along with most recent update on progress.

## 2020-09-03 ENCOUNTER — HOSPITAL ENCOUNTER (OUTPATIENT)
Dept: PHYSICAL THERAPY | Age: 75
Setting detail: THERAPIES SERIES
Discharge: HOME | End: 2020-09-03
Payer: MEDICARE

## 2020-09-03 PROCEDURE — 97110 THERAPEUTIC EXERCISES: CPT

## 2020-09-03 PROCEDURE — 97530 THERAPEUTIC ACTIVITIES: CPT

## 2020-09-03 NOTE — FLOWSHEET NOTE
[facility] [address]  Phone: [phone]   Fax:     [phone]    Physical Therapy Daily Treatment Note  Date:  9/3/2020    Patient Name:  Gigi Cuenca    :  1945  MRN: [id number]  Restrictions/Precautions:    Medical/Treatment Diagnosis Information:  Diagnosis: S43.101A (ICD-10-CM) - Separation of right acromioclavicular joint, initial encounter  Treatment Diagnosis: M25.511 Pain in Right Shoulder  Insurance/Certification information:  PT Insurance Information: Aetna  Physician Information:  Referring Practitioner: Isabel Stone MD  Has the plan of care been signed (Y/N):        []  Yes  [x]  No     Date of Patient follow up with Physician:       Is this a Progress Report:     []  Yes  [x]  No        If Yes:  Date Range for reporting period:  Beginning 20  Ending 20    Progress report will be due (10 Rx or 30 days whichever is less):       Recertification will be due (POC Duration  / 90 days whichever is less): 20         Visit # Insurance Allowable Auth Required   7  9/3 [place] []  Yes [x]  No        Functional Scale: UEFS: 70% deficit   Date assessed:  20    Latex Allergy:  [x]NO      []YES  Preferred Language for Healthcare:   [x]English       []other:    Pain level:  4/10  8/13     SUBJECTIVE:  9/3 Patient states that he is tired. He states that both his shoulders are bothering him.       OBJECTIVE:   ROM  PROM AROM  Comment    L R L R    Flexion   180 180 + pain present    Abduction   180 180 + pain present    ER   C7 R ear    IR    R PSIS    Other  (cervical)        Other             Strength   L R Comment   Flexion 5 4    Abduction 5 4    ER 5 3    IR 5 4    Supraspinatus      Upper Trap      Lower Trap      Mid Trap      Rhomboids      Biceps 5 5    Triceps      Horizontal Abduction      Horizontal Adduction      Lats        Special Tests Lats     Ext     Flex     Scapular Retraction 3 x 10; green tband Start 7/2   BIC     TRIC     PNF     No money 3 x 10; red tband ^8/6   Dynamic Stability          [employer]          Manual interventions                     Therapeutic Exercise and NMR EXR  [x] (45084) Provided verbal/tactile cueing for activities related to strengthening, flexibility, endurance, ROM  for improvements in scapular, scapulothoracic and UE control with self care, reaching, carrying, lifting, house/yardwork, driving/computer work. [x] (69713) Provided verbal/tactile cueing for activities related to improving balance, coordination, kinesthetic sense, posture, motor skill, proprioception  to assist with  scapular, scapulothoracic and UE control with self care, reaching, carrying, lifting, house/yardwork, driving/computer work. Therapeutic Activities:    [x] (30559 or 71174) Provided verbal/tactile cueing for activities related to improving balance, coordination, kinesthetic sense, posture, motor skill, proprioception and motor activation to allow for proper function of scapular, scapulothoracic and UE control with self care, carrying, lifting, driving/computer work. Home Exercise Program:    [x] (80790) Reviewed/Progressed HEP activities related to strengthening, flexibility, endurance, ROM of scapular, scapulothoracic and UE control with self care, reaching, carrying, lifting, house/yardwork, driving/computer work  [] (31475) Reviewed/Progressed HEP activities related to improving balance, coordination, kinesthetic sense, posture, motor skill, proprioception of scapular, scapulothoracic and UE control with self care, reaching, carrying, lifting, house/yardwork, driving/computer work    Access Code: [email or web address]   URL: [email or web address]   Date: 07/23/2020   Prepared by: Rita Mckeon     Manual Treatments:  PROM / STM / Oscillations-Mobs:  G-I, II, III, IV (PA's, Inf., Post.)  [] (15161) Provided manual therapy to mobilize soft tissue/joints of cervical/CT, scapular GHJ and UE for the purpose of modulating pain, promoting relaxation,  increasing ROM, reducing/eliminating soft tissue swelling/inflammation/restriction, improving soft tissue extensibility and allowing for proper ROM for normal function with self care, reaching, carrying, lifting, house/yardwork, driving/computer work    Modalities:  CP  10 min 7/23     Charges:  Timed Code Treatment Minutes: 45   Total Treatment Minutes: 60  2:45-3:45       [] EVAL (LOW) 01515 (typically 20 minutes face-to-face)  [] EVAL (MOD) 25920 (typically 30 minutes face-to-face)  [] EVAL (HIGH) 08093 (typically 45 minutes face-to-face)  [] RE-EVAL      [x] UB(15527) x 2    [] IONTO  [] NMR (02916) x     [] VASO  [] Manual (04154) x      [] Other:  [x] TA x 1      [] Mech Traction (15103)  [] ES(attended) (63626)      [] ES (un) (94925):     GOALS:  Patient stated goal: Reduce pain ; Improve ROM and strength   [] Progressing: [] Met: [] Not Met: [] Adjusted    Therapist goals for Patient:   Short Term Goals: To be achieved in: 2 weeks  1. Independent in HEP and progression per patient tolerance, in order to prevent re-injury. [] Progressing: [] Met: [] Not Met: [] Adjusted   2. Patient will have a decrease in pain to facilitate improvement in movement, function, and ADLs as indicated by Functional Deficits. [] Progressing: [] Met: [] Not Met: [] Adjusted    Long Term Goals: To be achieved in: 8 weeks  1. Disability index score of 40% or less for the UEFS to assist with reaching prior level of function. [] Progressing: [] Met: [] Not Met: [] Adjusted  2. Patient will demonstrate increased AROM to WNL to allow for proper joint functioning as indicated by patients Functional Deficits. [] Progressing: [] Met: [] Not Met: [] Adjusted  3.  Patient will demonstrate an increase in strength to good scapular and core control  for UE to allow for proper functional mobility as indicated by patients Functional Deficits. [] Progressing: [] Met: [] Not Met: [] Adjusted  4. Patient will return to Independent functional activities without increased symptoms or restriction. [] Progressing: [] Met: [] Not Met: [] Adjusted    Overall Progression Towards Functional goals/ Treatment Progress Update:  [] Patient is progressing as expected towards functional goals listed. [] Progression is slowed due to complexities/Impairments listed. [] Progression has been slowed due to co-morbidities. [x] Plan just implemented, too soon to assess goals progression <30days   [] Goals require adjustment due to lack of progress  [] Patient is not progressing as expected and requires additional follow up with physician  [] Other    Prognosis for POC: [x] Good [] Fair  [] Poor      Patient requires continued skilled intervention: [x] Yes  [] No    Treatment/Activity Tolerance:  [x] Patient able to complete treatment  [] Patient limited by fatigue  [] Patient limited by pain     [] Patient limited by other medical complications  [x] Other: 9/3 Patient tolerated treatment well this session. Able to tolerate more exercises. Patient interested in getting second opinion on shoulder; MD name and info supplied. Continue to progress as tolerated. Patient Education:                  PLAN: See eval  [x] Continue per plan of care [] Alter current plan (see comments above)  [] Plan of care initiated [] Hold pending MD visit [] Discharge      Electronically signed by:  Leslie Cox PT, DPT    Note: If patient does not return for scheduled/ recommended follow up visits, this note will serve as a discharge from care along with most recent update on progress.

## 2020-09-17 ENCOUNTER — HOSPITAL ENCOUNTER (OUTPATIENT)
Dept: PHYSICAL THERAPY | Age: 75
Setting detail: THERAPIES SERIES
Discharge: HOME | End: 2020-09-17

## 2020-09-17 NOTE — FLOWSHEET NOTE
The [address] [place] [address],[address], [facility]      Physical Therapy  Cancellation/No-show Note  Patient Name:  Gigi Credit  :  1945   Date:  2020  Cancelled visits to date: 01  No-shows to date: 3    For today's appointment patient:  [x]  Cancelled  []  Rescheduled appointment  []  No-show     Reason given by patient:  []  Patient ill  []  Conflicting appointment  []  No transportation    []  Conflict with work  []  No reason given  [x]  Other:  Patient fell   Comments:      Electronically signed by: Linnette Ladd, PT, DPT

## 2020-09-25 ENCOUNTER — OFFICE VISIT (OUTPATIENT)
Dept: INTERNAL MEDICINE CLINIC | Age: 75
End: 2020-09-25
Payer: MEDICARE

## 2020-09-25 VITALS
OXYGEN SATURATION: 97 % | HEART RATE: 93 BPM | DIASTOLIC BLOOD PRESSURE: 80 MMHG | WEIGHT: 169.8 LBS | BODY MASS INDEX: 27.29 KG/M2 | TEMPERATURE: 98.4 F | HEIGHT: 66 IN | SYSTOLIC BLOOD PRESSURE: 110 MMHG

## 2020-09-25 PROCEDURE — 99214 OFFICE O/P EST MOD 30 MIN: CPT | Performed by: INTERNAL MEDICINE

## 2020-09-25 PROCEDURE — G0008 ADMIN INFLUENZA VIRUS VAC: HCPCS | Performed by: INTERNAL MEDICINE

## 2020-09-25 PROCEDURE — 90694 VACC AIIV4 NO PRSRV 0.5ML IM: CPT | Performed by: INTERNAL MEDICINE

## 2020-09-25 ASSESSMENT — ENCOUNTER SYMPTOMS
DIARRHEA: 1
BACK PAIN: 0
EYE REDNESS: 0
NAUSEA: 0
CHEST TIGHTNESS: 0
BLOATING: 1
ABDOMINAL PAIN: 0
SHORTNESS OF BREATH: 0

## 2020-09-25 NOTE — PROGRESS NOTES
Subjective:      Patient ID: David Lazaro is a 77 y.o. male    Chief Complaint   Patient presents with    Hypertension     8 week f/u        Hypertension   This is a chronic problem. The current episode started more than 1 year ago. The problem is controlled. Associated symptoms include peripheral edema. Pertinent negatives include no chest pain, headaches, neck pain, palpitations or shortness of breath. Past treatments include diuretics. The current treatment provides significant improvement. Compliance problems include exercise, diet and psychosocial issues. Congestive Heart Failure   Presents for follow-up visit. Pertinent negatives include no abdominal pain, chest pain, fatigue, palpitations, shortness of breath or unexpected weight change. The symptoms have been worsening. (They do not know his medicines today, he is not compliant many days. ) Compliance with total regimen is 0-25%. Compliance problems include adherence to diet and adherence to exercise. Compliance with diet is 0-25%. Compliance with exercise is 0-25%. Compliance with medications is 26-50%. Diarrhea    This is a chronic problem. The current episode started more than 1 month ago. Diarrhea characteristics: loose. Associated symptoms include bloating. Pertinent negatives include no abdominal pain, arthralgias, fever or headaches. There are no known risk factors. He has tried nothing for the symptoms. they do not know his medicines today, he is not compliant many days.         Current Outpatient Medications on File Prior to Visit   Medication Sig Dispense Refill    sertraline (ZOLOFT) 50 MG tablet Take 1 tablet by mouth daily 30 tablet 5    amiodarone (CORDARONE) 200 MG tablet Take 1 tablet by mouth daily 30 tablet 5    torsemide (DEMADEX) 20 MG tablet Take 1 tablet by mouth See Admin Instructions 2 pills daily at 6 am and one daily at noon 90 tablet 5    magnesium oxide (MAG-OX) 400 MG tablet Take 400 mg by mouth 2 times daily  isosorbide dinitrate (ISORDIL) 20 MG tablet Take 20 mg by mouth 3 times daily      apixaban (ELIQUIS) 2.5 MG TABS tablet Take by mouth 2 times daily      pantoprazole (PROTONIX) 40 MG tablet Take 1 tablet by mouth every morning (before breakfast) 30 tablet 5    hydrALAZINE (APRESOLINE) 10 MG tablet Take 1 tablet by mouth 3 times daily 90 tablet 5    allopurinol (ZYLOPRIM) 100 MG tablet Take 1 tablet by mouth daily 90 tablet 0    lovastatin (MEVACOR) 40 MG tablet TAKE ONE TABLET BY MOUTH EVERY NIGHT AT BEDTIME 90 tablet 2    aspirin 81 MG chewable tablet Take 81 mg by mouth daily       No current facility-administered medications on file prior to visit.         No Known Allergies    Past Medical History:   Diagnosis Date    Cardiomyopathy ([facility] [address]     Cerebrovascular disease     HTN (hypertension) 2011    Other and unspecified hyperlipidemia 2011    Sleep disorder     Tachycardia 2011    TIA (transient ischemic attack) 2011     Past Surgical History:   Procedure Laterality Date    [facility]    HERNIA REPAIR Bilateral [id number]    PACEMAKER PLACEMENT  2013     Social History     Socioeconomic History    Marital status:      Spouse name: Not on file    Number of children: 3    Years of education: Not on file    Highest education level: Not on file   Occupational History    Occupation:     Social Needs    Financial resource strain: Not on file    Food insecurity     Worry: Not on file     Inability: Not on file   [employer] needs     Medical: Not on file     Non-medical: Not on file   Tobacco Use    Smoking status: Former Smoker     Packs/day: 0.50     Years: 30.00     Pack years: 15.00     Types: Cigarettes     Last attempt to quit: 10/13/2018     Years since quittin.9    Smokeless tobacco: Never Used   Substance and Sexual Activity    Alcohol use: No     Alcohol/week: 0.0 standard drinks    Drug use: No    Sexual activity: Not on file   Lifestyle    Physical activity     Days per week: Not on file     Minutes per session: Not on file    Stress: Not on file   Relationships    Social connections     Talks on phone: Not on file     Gets together: Not on file     Attends Rastafari service: Not on file     Active member of club or organization: Not on file     Attends meetings of clubs or organizations: Not on file     Relationship status: Not on file    Intimate partner violence     Fear of current or ex partner: Not on file     Emotionally abused: Not on file     Physically abused: Not on file     Forced sexual activity: Not on file   Other Topics Concern    Not on file   Social History Narrative    He follows track and field sports. 3 children and 5 grandchildren. Family History   Problem Relation Age of Onset    Osteoarthritis Father     Hypertension Father     Cancer Father         prostate, rectal    Stroke Father     ADHD Sister      Immunization History   Administered Date(s) Administered    Influenza, High Dose (Fluzone 65 yrs and older) 12/02/2016, 11/08/2017, 10/13/2018    Influenza, Quadv, adjuvanted, 65 yrs +, IM, PF (Fluad) 09/25/2020    Influenza, Triv, inactivated, subunit, adjuvanted, IM (Fluad 65 yrs and older) 12/02/2019    Pneumococcal Conjugate 13-valent ([id number]) 03/08/2016    Pneumococcal Polysaccharide ([id number]) 03/07/2018       Review of Systems   Constitutional: Negative for fatigue, fever and unexpected weight change. HENT: Negative for hearing loss. Eyes: Negative for redness and visual disturbance. Respiratory: Negative for chest tightness and shortness of breath. Cardiovascular: Negative for chest pain and palpitations. Gastrointestinal: Positive for bloating and diarrhea. Negative for abdominal pain and nausea. Genitourinary: Negative for dysuria and hematuria. Musculoskeletal: Negative for arthralgias, back pain and neck pain.    Skin: Negative for

## 2020-09-28 ENCOUNTER — TELEPHONE (OUTPATIENT)
Dept: INTERNAL MEDICINE CLINIC | Age: 75
End: 2020-09-28

## 2020-09-28 NOTE — TELEPHONE ENCOUNTER
Please have patient stop pantoprozole. This is a pill for acid reflux, but it sometimes causes diarrhea.

## 2020-09-28 NOTE — TELEPHONE ENCOUNTER
In hospital in [place] [id number] at [facility] after that pt came to see Dr Mary Chavez after release and seen Friday, some times pt is constipated and some times diarrhea    Current Meds:    Demadex 20 mg takes am   eliquis 2.5 (as directed)   amodmrone 200 mg 1 a day (prescribe by cardio)  Pantoprazole 40 mg  Sertraline 50 mg (as directed)     Wife would like to know what and why is he is having these issues, wife not sure when it started because Mr Garrison Saenz does not tell her when he is having issues no recent change in diet

## 2020-09-28 NOTE — TELEPHONE ENCOUNTER
The patient's wife is calling to update the patien's list of meds. She did not have the list at her last appt and they had changed many of the meds.   She is reporting the patient is still having diarrhea every 15 minutes and they feel it must be something in his meds for the past 3 weeks or longer

## 2020-10-02 LAB
HBV SURFACE AB TITR SER: <3.08 MIU/ML
HEPATITIS B SURFACE ANTIGEN: NONREACTIVE

## 2020-10-13 ENCOUNTER — TELEPHONE (OUTPATIENT)
Dept: INTERNAL MEDICINE CLINIC | Age: 75
End: 2020-10-13

## 2020-10-13 RX ORDER — HYDROCODONE BITARTRATE AND ACETAMINOPHEN 5; 325 MG/1; MG/1
1 TABLET ORAL EVERY 6 HOURS PRN
Qty: 64 TABLET | Refills: 0 | Status: SHIPPED | OUTPATIENT
Start: 2020-10-13 | End: 2021-04-06 | Stop reason: SDUPTHER

## 2020-10-13 NOTE — TELEPHONE ENCOUNTER
Pt called in to notify Dr. Fredis Castellanos bilateral dislocated shoulders. Information confirmed by wife Kye Hutchins as well as Ortho appt on 10/28/2020. Pt/Wife requesting something for pain.

## 2020-10-13 NOTE — TELEPHONE ENCOUNTER
Pain medicine provided for bilateral shoulder injuries. Ortho appointment for 10/28/2020. Medication sent to his [facility] in [facility]. Orders Placed This Encounter   Medications    HYDROcodone-acetaminophen (NORCO) 5-325 MG per tablet     Sig: Take 1 tablet by mouth every 6 hours as needed for Pain for up to 16 days.      Dispense:  64 tablet     Refill:  0

## 2020-10-22 ENCOUNTER — TELEPHONE (OUTPATIENT)
Dept: INTERNAL MEDICINE CLINIC | Age: 75
End: 2020-10-22

## 2020-11-11 ENCOUNTER — OFFICE VISIT (OUTPATIENT)
Dept: ORTHOPEDIC SURGERY | Age: 75
End: 2020-11-11
Payer: MEDICARE

## 2020-11-11 VITALS — BODY MASS INDEX: 25.11 KG/M2 | WEIGHT: 160 LBS | TEMPERATURE: 97.5 F | HEIGHT: 67 IN

## 2020-11-11 PROCEDURE — 99204 OFFICE O/P NEW MOD 45 MIN: CPT | Performed by: ORTHOPAEDIC SURGERY

## 2020-11-11 PROCEDURE — 20610 DRAIN/INJ JOINT/BURSA W/O US: CPT | Performed by: ORTHOPAEDIC SURGERY

## 2020-11-11 RX ORDER — LIDOCAINE HYDROCHLORIDE 10 MG/ML
20 INJECTION, SOLUTION INFILTRATION; PERINEURAL ONCE
Status: COMPLETED | OUTPATIENT
Start: 2020-11-11 | End: 2020-11-11

## 2020-11-11 RX ORDER — METHYLPREDNISOLONE ACETATE 40 MG/ML
40 INJECTION, SUSPENSION INTRA-ARTICULAR; INTRALESIONAL; INTRAMUSCULAR; SOFT TISSUE ONCE
Status: COMPLETED | OUTPATIENT
Start: 2020-11-11 | End: 2020-11-11

## 2020-11-11 RX ADMIN — LIDOCAINE HYDROCHLORIDE 20 ML: 10 INJECTION, SOLUTION INFILTRATION; PERINEURAL at 18:00

## 2020-11-11 RX ADMIN — METHYLPREDNISOLONE ACETATE 40 MG: 40 INJECTION, SUSPENSION INTRA-ARTICULAR; INTRALESIONAL; INTRAMUSCULAR; SOFT TISSUE at 18:00

## 2020-11-11 NOTE — PROGRESS NOTES
[facility]  History and Physical  Shoulder Pain    Date:  2020    Name:  Fernanda Gomes  Address:  [address] [id number]    :  1945      Age:   77 y.o.    SSN:  [id number]      Medical Record Number:  [id number]    Reason for Visit:    New Patient (right shoulder )      HPI:   Fernanda Gomes is a 77 y.o. male a past medical history that significant for TIA, CHF, stage IV chronic kidney disease for which he is on dialysis presents to our office today for consultation, evaluation and treatment of his right shoulder pain. The patient reports that he has gait instability and has had multiple falls over the last year. Most of his pain is persistent and achy with the right side. He was diagnosed with a grade 3 AC joint separation by Dr. Eleuterio Hunt. He was started on a supervised physical therapy program at the [facility] office. He is currently working with [employer] He has had more falls since than and is now having left shoulder pain. The patient reports that he is also been doing physical therapy on his left shoulder but that continues to bother him as well. Overall the patient reports that the left shoulder is bothering him more than the right shoulder. He denies any numbness or tingling down his arms. Pain Assessment  Location of Pain: Shoulder  Location Modifiers: Right, Left  Severity of Pain: 6  Quality of Pain: Aching  Duration of Pain: Persistent  Frequency of Pain: Constant  Aggravating Factors: Other (Comment)(sleeping)  Relieving Factors: Rest, Heat  Result of Injury: Yes  Work-Related Injury: No  Are there other pain locations you wish to document?: No    Review of Systems:  A 14 point review of systems available in the scanned medical record as documented by the patient. The review is negative with the exception of those things mentioned in the History of Present Illness and Past Medical History. Past History:  Past Medical History:   Diagnosis Date    Cardiomyopathy ([facility] [address]     Cerebrovascular disease     HTN (hypertension) 8/16/2011    Other and unspecified hyperlipidemia 8/16/2011    Sleep disorder     Tachycardia 8/16/2011    TIA (transient ischemic attack) 8/16/2011     Past Surgical History:   Procedure Laterality Date    [facility]    HERNIA REPAIR Bilateral [id number]    PACEMAKER PLACEMENT  2013     Current Outpatient Medications on File Prior to Visit   Medication Sig Dispense Refill    sertraline (ZOLOFT) 50 MG tablet Take 1 tablet by mouth daily 30 tablet 5    amiodarone (CORDARONE) 200 MG tablet Take 1 tablet by mouth daily 30 tablet 5    torsemide (DEMADEX) 20 MG tablet Take 1 tablet by mouth See Admin Instructions 2 pills daily at 6 am and one daily at noon 90 tablet 5    magnesium oxide (MAG-OX) 400 MG tablet Take 400 mg by mouth 2 times daily      isosorbide dinitrate (ISORDIL) 20 MG tablet Take 20 mg by mouth 3 times daily      apixaban (ELIQUIS) 2.5 MG TABS tablet Take by mouth 2 times daily      hydrALAZINE (APRESOLINE) 10 MG tablet Take 1 tablet by mouth 3 times daily 90 tablet 5    allopurinol (ZYLOPRIM) 100 MG tablet Take 1 tablet by mouth daily 90 tablet 0    lovastatin (MEVACOR) 40 MG tablet TAKE ONE TABLET BY MOUTH EVERY NIGHT AT BEDTIME 90 tablet 2    aspirin 81 MG chewable tablet Take 81 mg by mouth daily       No current facility-administered medications on file prior to visit.       Social History     Socioeconomic History    Marital status:      Spouse name: Not on file    Number of children: 3    Years of education: Not on file    Highest education level: Not on file   Occupational History    Occupation:     Social Needs    Financial resource strain: Not on file    Food insecurity     Worry: Not on file     Inability: Not on file   [employer] needs     Medical: Not on file     Non-medical: Not on file   Tobacco Use    Smoking status: Former Smoker     Packs/day: 0.50     Years: 30.00     Pack years: 15.00     Types: Cigarettes     Last attempt to quit: 10/13/2018     Years since quittin.0    Smokeless tobacco: Never Used   Substance and Sexual Activity    Alcohol use: No     Alcohol/week: 0.0 standard drinks    Drug use: No    Sexual activity: Not on file   Lifestyle    Physical activity     Days per week: Not on file     Minutes per session: Not on file    Stress: Not on file   Relationships    Social connections     Talks on phone: Not on file     Gets together: Not on file     Attends Oriental orthodox service: Not on file     Active member of club or organization: Not on file     Attends meetings of clubs or organizations: Not on file     Relationship status: Not on file    Intimate partner violence     Fear of current or ex partner: Not on file     Emotionally abused: Not on file     Physically abused: Not on file     Forced sexual activity: Not on file   Other Topics Concern    Not on file   Social History Narrative    He follows track and field sports. 3 children and 5 grandchildren.       Family History   Problem Relation Age of Onset    Osteoarthritis Father     Hypertension Father     Cancer Father         prostate, rectal    Stroke Father     ADHD Sister        Current Medications:    Current Outpatient Medications   Medication Sig Dispense Refill    sertraline (ZOLOFT) 50 MG tablet Take 1 tablet by mouth daily 30 tablet 5    amiodarone (CORDARONE) 200 MG tablet Take 1 tablet by mouth daily 30 tablet 5    torsemide (DEMADEX) 20 MG tablet Take 1 tablet by mouth See Admin Instructions 2 pills daily at 6 am and one daily at noon 90 tablet 5    magnesium oxide (MAG-OX) 400 MG tablet Take 400 mg by mouth 2 times daily      isosorbide dinitrate (ISORDIL) 20 MG tablet Take 20 mg by mouth 3 times daily      apixaban (ELIQUIS) 2.5 MG TABS tablet Take by mouth 2 times daily      hydrALAZINE (APRESOLINE) 10 MG tablet Take 1 tablet by mouth 3 times daily 90 tablet 5    allopurinol (ZYLOPRIM) 100 MG tablet Take 1 tablet by mouth daily 90 tablet 0    lovastatin (MEVACOR) 40 MG tablet TAKE ONE TABLET BY MOUTH EVERY NIGHT AT BEDTIME 90 tablet 2    aspirin 81 MG chewable tablet Take 81 mg by mouth daily       No current facility-administered medications for this visit. Allergies:  No Known Allergies    Physical Exam:  Vitals:    11/11/20 1623   Temp: 97.5 °F (36.4 °C)     General: Alisa Guy is a healthy and well appearing 77 y.o. male who is sitting comfortably in our office in acute distress. General Exam:   Constitutional: Patient is adequately groomed with no evidence of malnutrition  DTRs: Deep tendon reflexes are intact  Mental Status: The patient is oriented to time, place and person. The patient's mood and affect are appropriate. Lymphatic: The lymphatic examination bilaterally reveals all areas to be without enlargement or induration. Vascular: Examination reveals no swelling or calf tenderness. Peripheral pulses are palpable and 2+. Neurological: The patient has good coordination. There is no weakness or sensory deficit. Neuro: alert. Oriented X 3  Eyes: Extra-ocular muscles intact  Mouth: Oral mucosa moist. No perioral lesions  Pulm: Respirations unlabored and regular. right Shoulder Exam:  Inspection: There is prominence of his clavicle at the [facility] joint. no gross deformities, no signs of infection. Palpation: He has tenderness at the [facility] joint. The clavicle is reducible. He has tenderness over the rotator cuff footprint. Active Range of Motion: Forward elevation of 170, abduction of 170, external rotation with elbow at the side 30, internal rotation to the back is L4    Passive Range of Motion: Similar to active.     Strength:  4/5 External rotation with resistance,5/5 internal rotation with resistance,    Special Tests:positive champagne toast test no Juan Manuel muscle deformity. Neurovascular: Sensation to light touch is intact, no motor deficits, palpable radial pulses 2+  Comparison left shoulder Examination:    Inspection:   No gross deformities, no signs of infection. Palpation: No tenderness of the AC joint. Tenderness over the rotator cuff footprint. He has no tenderness in the bicipital groove or posterior joint line. Active Range of Motion: Forward elevation of 170, abduction of 170, external rotation with elbow at the side 40, internal rotation to the back is L4    Passive Range of Motion: Similar to active. Strength:  4/5 External rotation with resistance, +4/5 internal rotation with resistance,    Special Tests: Positive champagne toast test no Juan Manuel muscle deformity. Neurovascular: Sensation to light touch is intact, no motor deficits, palpable radial pulses 2+    Laboratory:  No visits with results within 14 Day(s) from this visit. Latest known visit with results is:   Hospital Outpatient Visit on 09/17/2020   Component Date Value    Hepatitis B Surface Ag 10/02/2020 NONREACTIVE     Hep B S Ab 10/02/2020 <3.08       No results found for this or any previous visit (from the past 24 hour(s)). Radiographic:  3 xray views of the left  shoulder including True AP in internal and external and axillary lateral were taken in our office today reveal no fractures, dislocations, visible tumors, or signs of acute trauma. He also had the one view of the right shoulder including the axillary lateral    X-ray right shoulder 5/21/2020     There is 1.5 shaft width superior displacement of the distal clavicle in relationship to the acromion consistent with dislocation and ligamentous injury.  There is a well-corticated bony fragment noted adjacent to the acromion projecting inferiorly    measuring 20 mm which may relate to old injury comparison.         Moderate degenerative change glenohumeral joint with joint space narrowing and spur formation. Bony sclerosis and mild irregularity about the greater tuberosity may relate to chronic tendinous injury.         No dislocation at the glenohumeral joint.         Partially imaged electronic lead along the expected course of the right ventricle.         Pressure         Dislocation at the [facility] joint acute or chronic new since 2014 exam.         Large bony fragment adjacent to the inferior aspect of the acromion consistent with old injury present previously.         Degenerative change glenohumeral joint         Bony sclerosis and irregularity about the greater tuberosity in the region of the insertion of the rotator cuff tendon may relate to chronic rotator cuff injury          Self assessment questionnaires including ASES and Simple Shoulder Test were completed today. Assessment:  Shreya Chatman is a 77 y.o. male with bilateral shoulder pain. He has a AC joint separation on the right side. He likely has a rotator cuff and some glenohumeral arthritis on the left side. Impression:  Encounter Diagnoses   Name Primary?  Bilateral shoulder pain, unspecified chronicity Yes    Acromioclavicular joint separation, type 3, right, initial encounter     Rotator cuff dysfunction, left     Tendinitis of left rotator cuff        Office Procedures:  Orders Placed This Encounter   Procedures    XR SHOULDER LEFT (MIN 2 VIEWS)     Standing Status:   Future     Number of Occurrences:   1     Standing Expiration Date:   11/11/2021     Order Specific Question:   Reason for exam:     Answer:   pain    XR SHOULDER RIGHT 1 VW     Axillary lateral only     Standing Status:   Future     Number of Occurrences:   1     Standing Expiration Date:   11/11/2021     Order Specific Question:   Reason for exam:     Answer:   pain    OR ARTHROCENTESIS ASPIR&/INJ MAJOR JT/BURSA W/O US     Procedure Note: Left shoulder injection   Risks and benefits of a corticosteroid injection were discussed with Mirta Montano.   80 milligrams of Depo Medrol and 8 CC of 1% lidocaine were injected in the left shoulder subacromial space following chlorhexidine prep. He  tolerated the procedure well with no immediate adverse sequelae after the injection. Plan:     In regards to his left shoulder the patient is showing rotator cuff weakness and it will be hard to differentiate between a small tear versus tendinitis without further imaging. At this point we feel that our treatment plan will not change so we will go ahead and have him continue with physical therapy for the left shoulder to work on rotator cuff strengthening. Additionally we offered him a corticosteroid injection in the left shoulder to help give him some relief. The patient was agreeable for this. In regards to his right shoulder he has a grade 3B AC joint separation. This is something that we would typically recommend surgical repair. We had a lengthy discussion regarding her right shoulder and through a shared decision making progress with the patient, he has decided to continue on with nonoperative management of the right shoulder. We feel that this is reasonable given that he has no pain in his right shoulder and taking into account his multiple co morbidities. Patient has been experiencing right hip pain. We will go ahead and refer him to Dr. Michael Mao for consultation evaluation of his hip pain. All of their questions were fully answered today. 11/11/2020  5:30 PM      Efrain Roca PA-C  Orthopaedic Sports Medicine Physician Assistant    During this examination, I, Efrain Roca PA-C, functioned as a scribe for Dr. Dorcas Perdomo. This dictation was performed with a verbal recognition program (DRAGON) and it was checked for errors. It is possible that there are still dictated errors within this office note. If so, please bring any errors to my attention for an addendum.   All efforts were made to ensure that this office note is

## 2020-12-01 ENCOUNTER — HOSPITAL ENCOUNTER (OUTPATIENT)
Dept: PHYSICAL THERAPY | Age: 75
Setting detail: THERAPIES SERIES
Discharge: HOME | End: 2020-12-01
Payer: MEDICARE

## 2020-12-01 NOTE — FLOWSHEET NOTE
The [address] [facility],[address], [place]      Physical Therapy  Cancellation/No-show Note  Patient Name:  Rika Gutierrez  :  1945   Date:  2020  Cancelled visits to date:   No-shows to date: 11    For today's appointment patient:  []  Cancelled  []  Rescheduled appointment  [x]  No-show     Reason given by patient:  []  Patient ill  []  Conflicting appointment  []  No transportation    []  Conflict with work  [x]  No reason given  []  Other:     Comments:      Electronically signed by: Almaz Feliz, PT, DPT

## 2020-12-03 ENCOUNTER — OFFICE VISIT (OUTPATIENT)
Dept: INTERNAL MEDICINE CLINIC | Age: 75
End: 2020-12-03
Payer: MEDICARE

## 2020-12-03 VITALS
HEART RATE: 92 BPM | OXYGEN SATURATION: 97 % | HEIGHT: 67 IN | SYSTOLIC BLOOD PRESSURE: 120 MMHG | TEMPERATURE: 98.6 F | WEIGHT: 156.4 LBS | DIASTOLIC BLOOD PRESSURE: 88 MMHG | BODY MASS INDEX: 24.55 KG/M2

## 2020-12-03 PROBLEM — N18.6 ESRD ON DIALYSIS (HCC): Status: ACTIVE | Noted: 2020-12-03

## 2020-12-03 PROBLEM — Z99.2 ESRD ON DIALYSIS (HCC): Status: ACTIVE | Noted: 2020-12-03

## 2020-12-03 PROCEDURE — G0439 PPPS, SUBSEQ VISIT: HCPCS | Performed by: INTERNAL MEDICINE

## 2020-12-03 RX ORDER — PREDNISONE 20 MG/1
20 TABLET ORAL 2 TIMES DAILY WITH MEALS
Qty: 10 TABLET | Refills: 0 | Status: SHIPPED | OUTPATIENT
Start: 2020-12-03 | End: 2020-12-08

## 2020-12-03 ASSESSMENT — PATIENT HEALTH QUESTIONNAIRE - PHQ9
SUM OF ALL RESPONSES TO PHQ QUESTIONS 1-9: 2
SUM OF ALL RESPONSES TO PHQ QUESTIONS 1-9: 2
2. FEELING DOWN, DEPRESSED OR HOPELESS: 1
1. LITTLE INTEREST OR PLEASURE IN DOING THINGS: 1
SUM OF ALL RESPONSES TO PHQ QUESTIONS 1-9: 2
SUM OF ALL RESPONSES TO PHQ9 QUESTIONS 1 & 2: 2

## 2020-12-03 ASSESSMENT — LIFESTYLE VARIABLES: HOW OFTEN DO YOU HAVE A DRINK CONTAINING ALCOHOL: 0

## 2020-12-03 NOTE — PROGRESS NOTES
Medicare Annual Wellness Visit  Name: Sylvia Clemente Date: 12/3/2020   MRN: [id number] Sex: Male   Age: 77 y.o. Ethnicity: Non-/Non    : 1945 Race: Black      Antoni Wilkins is here for Medicare AWV    Dialysis MWF from 12-4 pm.    Sore feet for the last week, no swelling, no erythema    Screenings for behavioral, psychosocial and functional/safety risks, and cognitive dysfunction are all negative except as indicated below. These results, as well as other patient data from the [address] form, are documented in Flowsheets linked to this Encounter. No Known Allergies      Prior to Visit Medications    Medication Sig Taking?  Authorizing Provider   predniSONE (DELTASONE) 20 MG tablet Take 1 tablet by mouth 2 times daily (with meals) for 5 days Yes Michael Pace MD   sertraline (ZOLOFT) 50 MG tablet Take 1 tablet by mouth daily Yes Michael Pace MD   amiodarone (CORDARONE) 200 MG tablet Take 1 tablet by mouth daily Yes Michael Pace MD   torsemide (DEMADEX) 20 MG tablet Take 1 tablet by mouth See Admin Instructions 2 pills daily at 6 am and one daily at noon Yes Michael Pace MD   magnesium oxide (MAG-OX) 400 MG tablet Take 400 mg by mouth 2 times daily Yes Historical Provider, MD   isosorbide dinitrate (ISORDIL) 20 MG tablet Take 20 mg by mouth 3 times daily Yes Historical Provider, MD   apixaban (ELIQUIS) 2.5 MG TABS tablet Take by mouth 2 times daily Yes Historical Provider, MD   hydrALAZINE (APRESOLINE) 10 MG tablet Take 1 tablet by mouth 3 times daily Yes Michael Pace MD   allopurinol (ZYLOPRIM) 100 MG tablet Take 1 tablet by mouth daily Yes Eugenia Wang MD   lovastatin (MEVACOR) 40 MG tablet TAKE ONE TABLET BY MOUTH EVERY NIGHT AT BEDTIME Yes Michael Pace MD   aspirin 81 MG chewable tablet Take 81 mg by mouth daily Yes Historical Provider, MD         Past Medical History:   Diagnosis Date    Cardiomyopathy ([facility] [address]     Cerebrovascular disease     HTN (hypertension) intact, no carotid bruits  Abdomen: soft, non-tender, non-distended, normal bowel sounds, no masses or organomegaly  Extremities: no cyanosis, clubbing or edema  Musculoskeletal: normal range of motion, no joint swelling, deformity or tenderness  Neurologic: reflexes normal and symmetric, no cranial nerve deficit, gait, coordination and speech normal    Patient's complete Health Risk Assessment and screening values have been reviewed and are found in Flowsheets. The following problems were reviewed today and where indicated follow up appointments were made and/or referrals ordered. Positive Risk Factor Screenings with Interventions:     Fall Risk:  2 or more falls in past year?: (!) yes  Fall with injury in past year?: (!) yes  Fall Risk Interventions:    · Home safety tips provided    General Health and ACP:  General  In general, how would you say your health is?: Fair  In the past 7 days, have you experienced any of the following? New or Increased Pain, New or Increased Fatigue, Loneliness, Social Isolation, Stress or Anger?: (!) New or Increased Fatigue(related to dialysis)  Do you get the social and emotional support that you need?: Yes  Do you have a Living Will?: (!) No  Advance Directives     Power of  Living Will ACP-Advance Directive ACP-Power of     Not on File Not on File Filed [address] [facility] [address] Risk Interventions:  · He refuses further eval and treatment for this.      Health Habits/Nutrition:  Health Habits/Nutrition  Do you exercise for at least 20 minutes 2-3 times per week?: (!) No  Have you lost any weight without trying in the past 3 months?: (!) Yes  Do you eat fewer than 2 meals per day?: No  Have you seen a dentist within the past year?: Yes  Body mass index: 24.49  Health Habits/Nutrition Interventions:  · Inadequate physical activity:  patient is not ready to increase his/her physical activity level at this time    Hearing/Vision:  No exam data present  Hearing/Vision  Do you or your family notice any trouble with your hearing?: No  Do you have difficulty driving, watching TV, or doing any of your daily activities because of your eyesight?: No  Have you had an eye exam within the past year?: (!) No  Hearing/Vision Interventions:  · Vision concerns:  patient encouraged to make appointment with his/her eye specialist    ADL:  ADLs  In the past 7 days, did you need help from others to perform any of the following everyday activities? Eating, dressing, grooming, bathing, toileting, or walking/balance?: (!) Dressing  In the past 7 days, did you need help from others to take care of any of the following? Laundry, housekeeping, banking/finances, shopping, telephone use, food preparation, transportation, or taking medications?: (!) Taking Medications, Transportation, Food Preparation, Telephone Use  ADL Interventions:  · He is getting help form his wife.      Personalized Preventive Plan   Current Health Maintenance Status  Immunization History   Administered Date(s) Administered    Influenza, High Dose (Fluzone 65 yrs and older) 12/02/2016, 11/08/2017, 10/13/2018    Influenza, Quadv, adjuvanted, 65 yrs +, IM, PF (Fluad) 09/25/2020    Influenza, Triv, inactivated, subunit, adjuvanted, IM (Fluad 65 yrs and older) 12/02/2019    Pneumococcal Conjugate 13-valent ([id number]) 03/08/2016    Pneumococcal Polysaccharide ([id number]) 03/07/2018        Health Maintenance   Topic Date Due    Shingles Vaccine (1 of 2) 09/13/1996    Annual Wellness Visit (AWV)  05/29/2019    TSH testing  12/03/2020    Lipid screen  12/03/2020    Potassium monitoring  07/29/2021    Creatinine monitoring  07/29/2021    DTaP/Tdap/Td vaccine (2 - Td) 02/18/2026    Colon cancer screen colonoscopy  02/20/2026    Flu vaccine  Completed    Pneumococcal 65+ yrs at Risk Vaccine  Completed    AAA screen  Completed    Hepatitis C screen  Completed    Hepatitis A vaccine  Aged C/ [place] [address] Hepatitis B vaccine  Aged Out    Hib vaccine  Aged Out    Meningococcal (ACWY) vaccine  Aged Out     Recommendations for [employer] Due: see orders and patient instructions/AVS.  Recommended screening schedule for the next 5-10 years is provided to the patient in written form: see Patient Instructions/AVS.    Lara Collazo was seen today for medicare awv. Diagnoses and all orders for this visit:    Well adult exam    Primary osteoarthritis involving multiple joints  -     predniSONE (DELTASONE) 20 MG tablet; Take 1 tablet by mouth 2 times daily (with meals) for 5 days    Essential hypertension    Chronic systolic congestive heart failure ([address]    ESRD on dialysis [facility])    Routine general medical examination at a health care facility    Slow transit constipation        1. Well adult exam  Stable. See orders. 2. Primary osteoarthritis involving multiple joints  Osteoarthritis multiple joints including feet hips and back. Temporary treatment with prednisone. - predniSONE (DELTASONE) 20 MG tablet; Take 1 tablet by mouth 2 times daily (with meals) for 5 days  Dispense: 10 tablet; Refill: 0    3. Essential hypertension  Stable hypertension. Continue current medicine. 4. Chronic systolic congestive heart failure (HCC)  Stable chronic systolic congestive heart failure. Continue on dialysis. 5. ESRD on dialysis [facility])  Continue on dialysis Monday Wednesday Friday. 6. Routine general medical examination at a health care facility  Stable. 7. Slow transit constipation  Stool softeners 1 or 2 daily.

## 2020-12-03 NOTE — PATIENT INSTRUCTIONS
Personalized Preventive Plan for Jay Jay Mead - 12/3/2020  Medicare offers a range of preventive health benefits. Some of the tests and screenings are paid in full while other may be subject to a deductible, co-insurance, and/or copay. Some of these benefits include a comprehensive review of your medical history including lifestyle, illnesses that may run in your family, and various assessments and screenings as appropriate. After reviewing your medical record and screening and assessments performed today your provider may have ordered immunizations, labs, imaging, and/or referrals for you. A list of these orders (if applicable) as well as your Preventive Care list are included within your After Visit Summary for your review. Other Preventive Recommendations:    · A preventive eye exam performed by an eye specialist is recommended every 1-2 years to screen for glaucoma; cataracts, macular degeneration, and other eye disorders. · A preventive dental visit is recommended every 6 months. · Try to get at least 150 minutes of exercise per week or 10,000 steps per day on a pedometer . · Order or download the FREE \"Exercise & Physical Activity: Your Everyday Guide\" from [employer]. Call 4-719.623.8264 or search [employer] online. · You need 9853-3408 mg of calcium and 1658-6298 IU of vitamin D per day. It is possible to meet your calcium requirement with diet alone, but a vitamin D supplement is usually necessary to meet this goal.  · When exposed to the sun, use a sunscreen that protects against both UVA and UVB radiation with an SPF of 30 or greater. Reapply every 2 to 3 hours or after sweating, drying off with a towel, or swimming. · Always wear a seat belt when traveling in a car. Always wear a helmet when riding a bicycle or motorcycle.

## 2020-12-18 ENCOUNTER — OFFICE VISIT (OUTPATIENT)
Dept: ORTHOPEDIC SURGERY | Age: 75
End: 2020-12-18
Payer: MEDICARE

## 2020-12-18 VITALS — WEIGHT: 156 LBS | BODY MASS INDEX: 24.48 KG/M2 | TEMPERATURE: 98.1 F | HEIGHT: 67 IN

## 2020-12-18 PROCEDURE — 99213 OFFICE O/P EST LOW 20 MIN: CPT | Performed by: ORTHOPAEDIC SURGERY

## 2020-12-18 NOTE — PROGRESS NOTES
Date:  2020    Name:  Christian De Leon  Address:  [id number]    :  1945      Age:   77 y.o.    SSN:  [id number]      Medical Record Number:  [id number]    Reason for Visit:    Chief Complaint    Hip Pain (New patient -- referred by Dr Summer Alvarez -- patient fell 3 weeks ago in parking lot and hurt/bruised right hip)      DOS:2020     HPI: Chema Mata is a 77 y.o. male here today for evaluation of right hip pain that has been ongoing for 3 weeks. Patient is currently attending dialysis 3 times a week for kidney failure. Patient states that 3 weeks ago he fell after his dialysis and landed directly onto the right hip. At first it was painful to ambulate but he has seen great improvement over the last 3 weeks. He is now able to ambulate without much pain in the shoulders are bothering him more than his hip. He is here today to make sure there is nothing that needs intervention. Patient does not want to take any anti-inflammatories and is denying any cortisone injections      Pain Assessment  Location of Pain: (hip)  Location Modifiers: Right  Severity of Pain: 4  Quality of Pain: Aching  Duration of Pain: Persistent  Frequency of Pain: Constant  Date Pain First Started: 20  Aggravating Factors: Other (Comment)  Limiting Behavior: Some  Relieving Factors: Rest  Result of Injury: Yes  Work-Related Injury: No  Are there other pain locations you wish to document?: No  ROS: Review of systems reviewed from Patient History Form completed today and available in the patient's chart under the Media tab.      Past Medical History:   Diagnosis Date    Cardiomyopathy [facility])     Cerebrovascular disease     HTN (hypertension) 2011    Other and unspecified hyperlipidemia 2011    Sleep disorder     Tachycardia 2011    TIA (transient ischemic attack) 2011        Past Surgical History:   Procedure Laterality Date    CARDIAC [facility]ACEMENT      [facility]    HERNIA REPAIR Bilateral [id number]    PACEMAKER PLACEMENT  2013       Family History   Problem Relation Age of Onset    Osteoarthritis Father     Hypertension Father     Cancer Father         prostate, rectal    Stroke Father     ADHD Sister        Social History     Socioeconomic History    Marital status:      Spouse name: None    Number of children: 3    Years of education: None    Highest education level: None   Occupational History    Occupation:     Social Needs    Financial resource strain: None    Food insecurity     Worry: None     Inability: None    Transportation needs     Medical: None     Non-medical: None   Tobacco Use    Smoking status: Former Smoker     Packs/day: 0.50     Years: 30.00     Pack years: 15.00     Types: Cigarettes     Quit date: 10/13/2018     Years since quittin.1    Smokeless tobacco: Never Used   Substance and Sexual Activity    Alcohol use: No     Alcohol/week: 0.0 standard drinks    Drug use: No    Sexual activity: None   Lifestyle    Physical activity     Days per week: None     Minutes per session: None    Stress: None   Relationships    Social connections     Talks on phone: None     Gets together: None     Attends Restoration service: None     Active member of club or organization: None     Attends meetings of clubs or organizations: None     Relationship status: None    Intimate partner violence     Fear of current or ex partner: None     Emotionally abused: None     Physically abused: None     Forced sexual activity: None   Other Topics Concern    None   Social History Narrative    He follows [employer] and field sports. 3 children and 5 grandchildren.         Current Outpatient Medications   Medication Sig Dispense Refill    sertraline (ZOLOFT) 50 MG tablet Take 1 tablet by mouth daily 30 tablet 5    amiodarone (CORDARONE) 200 MG tablet Take 1 tablet by mouth daily 30 tablet 5    torsemide (DEMADEX) 20 MG tablet Take 1 tablet by mouth See Admin Instructions 2 pills daily at 6 am and one daily at noon 90 tablet 5    magnesium oxide (MAG-OX) 400 MG tablet Take 400 mg by mouth 2 times daily      isosorbide dinitrate (ISORDIL) 20 MG tablet Take 20 mg by mouth 3 times daily      apixaban (ELIQUIS) 2.5 MG TABS tablet Take by mouth 2 times daily      hydrALAZINE (APRESOLINE) 10 MG tablet Take 1 tablet by mouth 3 times daily 90 tablet 5    allopurinol (ZYLOPRIM) 100 MG tablet Take 1 tablet by mouth daily 90 tablet 0    lovastatin (MEVACOR) 40 MG tablet TAKE ONE TABLET BY MOUTH EVERY NIGHT AT BEDTIME 90 tablet 2    aspirin 81 MG chewable tablet Take 81 mg by mouth daily       No current facility-administered medications for this visit. No Known Allergies    Vital signs:  Temp 98.1 °F (36.7 °C)   Ht 5' 7\" (1.702 m)   Wt 156 lb (70.8 kg)   BMI 24.43 kg/m²      Constitution: Patient cooperative with examination today. Well-developed, well-nourished in no acute distress. Neuro: Alert & oriented x 3,  no focal motor or sensory deficits noted. Eyes: sclera clear, atraumatic  Ears: Normal external ear  Mouth:  No perioral lesions  Pulm: Respirations unlabored and regular  Pulse: Extremities well-perfused. 2+ peripheral pulses   Skin: Warm, no ulcerations    Hip Examination:  Right     Inspection: No skin abnormalities  Palpation: There is no crepitus, there is tenderness over  greater trochanter. There is a 1 cm x 1 cm palpable and mobile mass overlying the greater trochanter  Range of Motion:  Full painless range of motion of the hip. Strength:   Weakness appreciated with abduction  Stability: No instability  Special Tests:   Painless resisted flexion. Positive yolanda test   Neuro: Sensation equal bilateral lower extremities   Vascular: 2+ posterior tibialis pulse    Diagnostics:  Radiology:     Pertinent imaging reviewed, images only - no report available.     X-rays obtained including AP pelvis and frog-leg lateral right hip    Impression: No obvious fracture dislocation of the right hip. Mild joint space narrowing. Patient does have notable cam deformities bilaterally      Assessment: 60-year-old male with right hip trochanteric bursitis    Plan: Pertinent imaging was reviewed. The etiology, natural history, and treatment options for the disorder were discussed. The roles of activity medication, antiinflammatories, injections, bracing, physical therapy, and surgical interventions were all described to the patient and questions were answered. Akila Tavares is expressed that he does not want to take any new medication and is denying injections. We recommended that he could use ice on the hip as that can help with inflammation but he states that he does not want to try the ice at this point either. He wants to see how we will go without any further treatment. We can see him back as needed or if he sees any increasing pain in the right hip. Paula Betancourt is in agreement with this plan. All questions were answered to patient's satisfaction and was encouraged to call with any further questions. Bernie Arteaga DO  Clinical Fellow  [address] [place] [address]     12/18/20  9:30 AM Attestation:  I was physically present and performed my own examination of this patient and have discussed the case, including pertinent history and exam findings with the fellow. I agree with the documented assessment and plan. Maggie Bustillo.  Jane Roa MD    Orders Placed This Encounter   Procedures    XR HIP 2-3 VW W PELVIS RIGHT     Standing Status:   Future     Number of Occurrences:   1     Standing Expiration Date:   12/18/2021     Order Specific Question:   Reason for exam:     Answer:   Pain

## 2021-01-06 ENCOUNTER — OFFICE VISIT (OUTPATIENT)
Dept: ORTHOPEDIC SURGERY | Age: 76
End: 2021-01-06
Payer: MEDICARE

## 2021-01-06 VITALS — BODY MASS INDEX: 24.5 KG/M2 | TEMPERATURE: 98.2 F | HEIGHT: 67 IN | WEIGHT: 156.09 LBS

## 2021-01-06 DIAGNOSIS — M75.112 NONTRAUMATIC INCOMPLETE TEAR OF LEFT ROTATOR CUFF: Primary | ICD-10-CM

## 2021-01-06 DIAGNOSIS — S43.101A ACROMIOCLAVICULAR JOINT SEPARATION, TYPE 3, RIGHT, INITIAL ENCOUNTER: ICD-10-CM

## 2021-01-06 PROCEDURE — 99212 OFFICE O/P EST SF 10 MIN: CPT | Performed by: ORTHOPAEDIC SURGERY

## 2021-01-06 NOTE — PROGRESS NOTES
History of Present Illness:  Benigno Kilgore is a 77 y.o. male here for follow-up of bilateral shoulders. Patient was last seen on 11/11/2020 at which point he was diagnosed with a right grade 3 AC separation and left shoulder tendinitis with possible small rotator cuff tear. He was sent to physical therapy and was given a corticosteroid injection to the left subacromial space. Unfortunately this did not offer him much relief and has not seen much improvement with physical therapy. His left shoulder continues to bother him especially at night and with reaching and lifting objects. The right shoulder has improved and is no longer bothering him. Medical History:  Patient's medications, allergies, past medical, surgical, social and family histories were reviewed and updated as appropriate. Pertinent items are noted in HPI  Review of systems reviewed from Patient History Form dated on 1/6/2021 and available in the patient's chart under the Media tab. Vital Signs:  Vitals:    01/06/21 0912   Temp: 98.2 °F (36.8 °C)         Neuro: Alert & oriented x 3,  normal,  no focal deficits noted. Normal affect. Eyes: sclera clear  Ears: Normal external ear  Mouth:  No perioral lesions  Pulm: Respirations unlabored and regular  Pulse: Regular rate and rhythm   Skin: Warm, well perfused      Constitutional: The physical examination finds the patient to be well-developed and well-nourished. The patient is alert and oriented x3 and was cooperative throughout the visit. Left shoulder exam    Inspection:  Held in a normal posture. Normal contour at the acromioclavicular joint. No swelling, ecchymosis, or erythema about the shoulder. No atrophy appreciated. No scapular winging. Palpation:  tenderness to palpation [facility] [place] joint.   Mild tenderness over the greater tuberosity    Range of Motion: Active range of motion to 110 degrees of forward flexion, 90 degrees of abduction, 35 degrees of external Future     Standing Expiration Date:   1/6/2022     Order Specific Question:   Reason for exam:     Answer:   Left CT arthrogram Proscan [place]           Plan: Unfortunately Arabella Doshi did not get much relief from his cortisone injection into the left shoulder. At this point we feel it necessary to further investigate the internal structures of the left shoulder. Unfortunately he is unable to get an MRI secondary to a pacemaker so we would recommend a CT arthrogram.  We will get him set up to get that accomplished at [facility] and we will see him back following to review the results. Zahida Chase is in agreement with this plan. All questions were answered to patient's satisfaction and was encouraged to call with any further questions. Judy Nicola, [address] [facility]     01/06/21  9:46 AM    The encounter with aMrk Petersen was supervised by Dr Bailee Chavez who personally examined the patient and reviewed the plan. This dictation was performed with a verbal recognition program (DRAGON) and it was checked for errors. It is possible that there are still dictated errors within this office note. If so, please bring any errors to my attention for an addendum. All efforts were made to ensure that this office note is accurate.  ______________  I was physically present and personally supervised the Orthopaedic Sports Medicine Fellow in the evaluation and development of a treatment plan for this patient. I personally interviewed the patient and performed a physical examination. In addition, I discussed the patient's condition and treatment options with them. I have also reviewed and agree with the past medical, family and social history unless otherwise noted. All of the patient's questions were answered. Diane Chavez MD, PhD  1/6/2021

## 2021-01-12 ENCOUNTER — OFFICE VISIT (OUTPATIENT)
Dept: VASCULAR SURGERY | Age: 76
End: 2021-01-12
Payer: MEDICARE

## 2021-01-12 VITALS
WEIGHT: 152 LBS | DIASTOLIC BLOOD PRESSURE: 70 MMHG | HEIGHT: 66 IN | BODY MASS INDEX: 24.43 KG/M2 | TEMPERATURE: 97 F | SYSTOLIC BLOOD PRESSURE: 134 MMHG

## 2021-01-12 DIAGNOSIS — N18.6 ESRD ON DIALYSIS (HCC): Primary | ICD-10-CM

## 2021-01-12 DIAGNOSIS — Z99.2 ESRD ON DIALYSIS (HCC): Primary | ICD-10-CM

## 2021-01-12 PROCEDURE — 99203 OFFICE O/P NEW LOW 30 MIN: CPT | Performed by: SURGERY

## 2021-01-12 NOTE — PROGRESS NOTES
[address] [facility] Vascular and Endovascular Surgery  Consultation Note    Nephrologist:  Vishnu    Chief Complaint / Reason for Consultation  ESRD    History of Present Illness  Patient is a 77 y.o. male with history of end-stage renal disease on hemodialysis  with right chest wall dialysis catheter. He is right hand dominant. No previous access creation. He does have a left chest wall defibrillator in place. Review of Systems     Denies fevers, chills, chest pain, shortness of breath, nausea, vomiting, hematemesis, diarrhea, constipation, melena, hematochezia, wt changes, vision problems, blindness, hearing problems, facial droop, slurred speech, extremity weakness, extremity numbness, dysuria.     Past Medical History:   Diagnosis Date    Cardiomyopathy [facility])     Cerebrovascular disease     HTN (hypertension) 2011    Other and unspecified hyperlipidemia 2011    Sleep disorder     Tachycardia 2011    TIA (transient ischemic attack) 2011       Past Surgical History:   Procedure Laterality Date    CARDIAC DEFIBRILLATOR PLACEMENT      [facility]    HERNIA REPAIR Bilateral [phone]    PACEMAKER PLACEMENT         No Known Allergies    Social History     Socioeconomic History    Marital status:      Spouse name: Not on file    Number of children: 3    Years of education: Not on file    Highest education level: Not on file   Occupational History    Occupation:     Social Needs    Financial resource strain: Not on file    Food insecurity     Worry: Not on file     Inability: Not on file   [employer] needs     Medical: Not on file     Non-medical: Not on file   Tobacco Use    Smoking status: Current Every Day Smoker     Packs/day: 0.50     Years: 30.00     Pack years: 15.00     Types: Cigarettes     Last attempt to quit: 10/13/2018     Years since quittin.2    Smokeless tobacco: Never Used   Substance and Sexual Activity    Alcohol use: No     Alcohol/week: 0.0 standard drinks    Drug use: No    Sexual activity: Not on file   Lifestyle    Physical activity     Days per week: Not on file     Minutes per session: Not on file    Stress: Not on file   Relationships    Social connections     Talks on phone: Not on file     Gets together: Not on file     Attends Rastafari service: Not on file     Active member of club or organization: Not on file     Attends meetings of clubs or organizations: Not on file     Relationship status: Not on file    Intimate partner violence     Fear of current or ex partner: Not on file     Emotionally abused: Not on file     Physically abused: Not on file     Forced sexual activity: Not on file   Other Topics Concern    Not on file   Social History Narrative    He follows [employer] and field sports. 3 children and 5 grandchildren.         Family History   Problem Relation Age of Onset   Lopez Osteoarthritis Father     Hypertension Father     Cancer Father         prostate, rectal    Stroke Father     ADHD Sister      - No history of bleeding or clotting disorders    Vital Signs  Vitals:    01/12/21 1402   BP: 134/70   Temp: 97 °F (36.1 °C)   Weight: 152 lb (68.9 kg)   Height: 5' 6\" (1.676 m)       Physical Examination  General:  no apparent distress  Psychiatric: affect appropriate  Head/Eyes/Ears/Nose/Throat:  Atraumatic, vision and hearing intact, face symmetric  Neck:  supple  Chest/Lungs: clear to auscultation bilaterally  Cardiac:  Regular rate and rhythm  Abdomen: soft, nontender  Extremities: warm and well perfused  - bilateral upper extremity motorsensory intact  - bilateral lower extremity motorsensory intact  Vascular exam:  - R femoral: 2  - L femoral: 2  - R DP: 1  - L DP: 1  - R PT: 1  - L PT: 1      Labs  Lab Results   Component Value Date    WBC 4.4 12/03/2019    HGB 14.0 12/03/2019    HCT 44.5 12/03/2019    MCV 96.5 12/03/2019     12/03/2019     Lab Results   Component Value Date     07/29/2020    K 3.8 07/29/2020     07/29/2020    CO2 24 07/29/2020    PHOS 3.9 03/07/2018    BUN 38 07/29/2020    CREATININE 4.37 07/29/2020      No components found for: GLU    Imaging: Outside hospital imaging reviewed. Assessment:   End-stage renal disease on hemodialysis      Plan:  Vein mapping would suggest he is a candidate for a left radiocephalic fistula. We'll perform intraoperative duplex to ensure adequate caliber of the vein and artery. Discuss options for radiocephalic fistula, brachiocephalic fistula, and left forearm AV graft. All questions answered      Mike Briggs M.D., FACS.  1/12/2021  2:10 PM

## 2021-01-13 ENCOUNTER — PREP FOR PROCEDURE (OUTPATIENT)
Dept: VASCULAR SURGERY | Age: 76
End: 2021-01-13

## 2021-01-13 DIAGNOSIS — N18.6 ESRD ON DIALYSIS (HCC): Primary | ICD-10-CM

## 2021-01-13 DIAGNOSIS — Z99.2 ESRD ON DIALYSIS (HCC): Primary | ICD-10-CM

## 2021-01-13 RX ORDER — SODIUM CHLORIDE 9 MG/ML
INJECTION, SOLUTION INTRAVENOUS CONTINUOUS
Status: CANCELLED | OUTPATIENT
Start: 2021-01-13

## 2021-01-13 RX ORDER — SODIUM CHLORIDE 0.9 % (FLUSH) 0.9 %
10 SYRINGE (ML) INJECTION EVERY 12 HOURS SCHEDULED
Status: CANCELLED | OUTPATIENT
Start: 2021-01-13

## 2021-01-13 RX ORDER — SODIUM CHLORIDE 0.9 % (FLUSH) 0.9 %
10 SYRINGE (ML) INJECTION PRN
Status: CANCELLED | OUTPATIENT
Start: 2021-01-13

## 2021-01-20 ENCOUNTER — TELEPHONE (OUTPATIENT)
Dept: ORTHOPEDIC SURGERY | Age: 76
End: 2021-01-20

## 2021-02-12 ENCOUNTER — OFFICE VISIT (OUTPATIENT)
Dept: PRIMARY CARE CLINIC | Age: 76
End: 2021-02-12
Payer: MEDICARE

## 2021-02-12 ENCOUNTER — TELEPHONE (OUTPATIENT)
Dept: CARDIOTHORACIC SURGERY | Age: 76
End: 2021-02-12

## 2021-02-12 ENCOUNTER — HOSPITAL ENCOUNTER (OUTPATIENT)
Age: 76
Discharge: HOME | End: 2021-02-12
Payer: MEDICARE

## 2021-02-12 DIAGNOSIS — N18.6 ESRD ON DIALYSIS (HCC): ICD-10-CM

## 2021-02-12 DIAGNOSIS — Z99.2 ESRD ON DIALYSIS (HCC): ICD-10-CM

## 2021-02-12 DIAGNOSIS — Z20.828 EXPOSURE TO SARS-ASSOCIATED CORONAVIRUS: Primary | ICD-10-CM

## 2021-02-12 LAB
ABO/RH: NORMAL
ANION GAP SERPL CALCULATED.3IONS-SCNC: 13 MMOL/L (ref 3–16)
ANTIBODY SCREEN: NORMAL
APTT: 37.6 SEC (ref 24.2–36.2)
BILIRUBIN URINE: ABNORMAL
BLOOD, URINE: ABNORMAL
BUN BLDV-MCNC: 22 MG/DL (ref 7–20)
CALCIUM SERPL-MCNC: 10 MG/DL (ref 8.3–10.6)
CHLORIDE BLD-SCNC: 97 MMOL/L (ref 99–110)
CLARITY: ABNORMAL
CO2: 29 MMOL/L (ref 21–32)
COLOR: ABNORMAL
CREAT SERPL-MCNC: 6.3 MG/DL (ref 0.8–1.3)
EPITHELIAL CELLS, UA: 0 /HPF (ref 0–5)
GFR AFRICAN AMERICAN: 11
GFR NON-AFRICAN AMERICAN: 9
GLUCOSE BLD-MCNC: 100 MG/DL (ref 70–99)
GLUCOSE URINE: NEGATIVE MG/DL
HCT VFR BLD CALC: 42.7 % (ref 40.5–52.5)
HEMOGLOBIN: 13.6 G/DL (ref 13.5–17.5)
HYALINE CASTS: 6 /LPF (ref 0–8)
INR BLD: 1.26 (ref 0.86–1.14)
KETONES, URINE: ABNORMAL MG/DL
LEUKOCYTE ESTERASE, URINE: ABNORMAL
MCH RBC QN AUTO: 33.5 PG (ref 26–34)
MCHC RBC AUTO-ENTMCNC: 31.8 G/DL (ref 31–36)
MCV RBC AUTO: 105.5 FL (ref 80–100)
MICROSCOPIC EXAMINATION: YES
NITRITE, URINE: NEGATIVE
PDW BLD-RTO: 17.6 % (ref 12.4–15.4)
PH UA: 5.5 (ref 5–8)
PLATELET # BLD: 164 K/UL (ref 135–450)
PMV BLD AUTO: 8.6 FL (ref 5–10.5)
POTASSIUM SERPL-SCNC: 4.5 MMOL/L (ref 3.5–5.1)
PROTEIN UA: 100 MG/DL
PROTHROMBIN TIME: 14.6 SEC (ref 10–13.2)
RBC # BLD: 4.05 M/UL (ref 4.2–5.9)
RBC UA: ABNORMAL /HPF (ref 0–4)
SODIUM BLD-SCNC: 139 MMOL/L (ref 136–145)
SPECIFIC GRAVITY UA: 1.02 (ref 1–1.03)
URINE TYPE: ABNORMAL
UROBILINOGEN, URINE: 1 E.U./DL
WBC # BLD: 5 K/UL (ref 4–11)
WBC UA: 133 /HPF (ref 0–5)

## 2021-02-12 PROCEDURE — 85610 PROTHROMBIN TIME: CPT

## 2021-02-12 PROCEDURE — 99211 OFF/OP EST MAY X REQ PHY/QHP: CPT | Performed by: NURSE PRACTITIONER

## 2021-02-12 PROCEDURE — 86850 RBC ANTIBODY SCREEN: CPT

## 2021-02-12 PROCEDURE — 85027 COMPLETE CBC AUTOMATED: CPT

## 2021-02-12 PROCEDURE — 85730 THROMBOPLASTIN TIME PARTIAL: CPT

## 2021-02-12 PROCEDURE — 36415 COLL VENOUS BLD VENIPUNCTURE: CPT

## 2021-02-12 PROCEDURE — 86900 BLOOD TYPING SEROLOGIC ABO: CPT

## 2021-02-12 PROCEDURE — 86901 BLOOD TYPING SEROLOGIC RH(D): CPT

## 2021-02-12 PROCEDURE — 81001 URINALYSIS AUTO W/SCOPE: CPT

## 2021-02-12 PROCEDURE — 80048 BASIC METABOLIC PNL TOTAL CA: CPT

## 2021-02-12 NOTE — PATIENT INSTRUCTIONS

## 2021-02-13 LAB — SARS-COV-2, NAA: NOT DETECTED

## 2021-02-15 ENCOUNTER — TELEPHONE (OUTPATIENT)
Dept: VASCULAR SURGERY | Age: 76
End: 2021-02-15

## 2021-02-15 NOTE — TELEPHONE ENCOUNTER
Called patient regarding moving surgery up to 7:15am and to arrive at 6:00am. Left messages on cell and home phones.  [place]

## 2021-02-26 ENCOUNTER — OFFICE VISIT (OUTPATIENT)
Dept: PRIMARY CARE CLINIC | Age: 76
End: 2021-02-26
Payer: MEDICARE

## 2021-02-26 DIAGNOSIS — Z20.828 EXPOSURE TO SARS-ASSOCIATED CORONAVIRUS: Primary | ICD-10-CM

## 2021-02-26 LAB — SARS-COV-2: NOT DETECTED

## 2021-02-26 PROCEDURE — 99211 OFF/OP EST MAY X REQ PHY/QHP: CPT | Performed by: NURSE PRACTITIONER

## 2021-02-26 NOTE — PROGRESS NOTES
Notified pt's wife, Maryann Mejia, of new date/time of surgery. Pt will arrive @ 1030 on 3/4/21 for surgery @ 1200 @ main hosp.

## 2021-02-26 NOTE — PATIENT INSTRUCTIONS

## 2021-02-26 NOTE — PROGRESS NOTES
Dionne Yeungrubinagarrett received a viral test for COVID-19. They were educated on isolation and quarantine as appropriate. For any symptoms, they were directed to seek care from their PCP, given contact information to establish with a doctor, directed to an urgent care or the emergency room.

## 2021-03-04 ENCOUNTER — HOSPITAL ENCOUNTER (OUTPATIENT)
Age: 76
Setting detail: OUTPATIENT SURGERY
Discharge: HOME | End: 2021-03-04
Attending: SURGERY | Admitting: SURGERY
Payer: MEDICARE

## 2021-03-04 ENCOUNTER — ANESTHESIA EVENT (OUTPATIENT)
Dept: OPERATING ROOM | Age: 76
End: 2021-03-04
Payer: MEDICARE

## 2021-03-04 ENCOUNTER — ANESTHESIA (OUTPATIENT)
Dept: OPERATING ROOM | Age: 76
End: 2021-03-04
Payer: MEDICARE

## 2021-03-04 VITALS
SYSTOLIC BLOOD PRESSURE: 104 MMHG | HEART RATE: 82 BPM | BODY MASS INDEX: 21.55 KG/M2 | DIASTOLIC BLOOD PRESSURE: 83 MMHG | RESPIRATION RATE: 13 BRPM | OXYGEN SATURATION: 100 % | WEIGHT: 133.5 LBS | TEMPERATURE: 97.8 F

## 2021-03-04 VITALS
TEMPERATURE: 97.9 F | DIASTOLIC BLOOD PRESSURE: 88 MMHG | SYSTOLIC BLOOD PRESSURE: 123 MMHG | OXYGEN SATURATION: 76 % | RESPIRATION RATE: 3 BRPM

## 2021-03-04 DIAGNOSIS — N18.6 ESRD (END STAGE RENAL DISEASE) (HCC): Primary | ICD-10-CM

## 2021-03-04 LAB
ABO/RH: NORMAL
ANION GAP SERPL CALCULATED.3IONS-SCNC: 13 MMOL/L (ref 3–16)
ANTIBODY SCREEN: NORMAL
BUN BLDV-MCNC: 27 MG/DL (ref 7–20)
CALCIUM SERPL-MCNC: 10.2 MG/DL (ref 8.3–10.6)
CHLORIDE BLD-SCNC: 97 MMOL/L (ref 99–110)
CO2: 28 MMOL/L (ref 21–32)
CREAT SERPL-MCNC: 0.5 MG/DL (ref 0.8–1.3)
GFR AFRICAN AMERICAN: >60
GFR NON-AFRICAN AMERICAN: >60
GLUCOSE BLD-MCNC: 84 MG/DL (ref 70–99)
POTASSIUM SERPL-SCNC: 5 MMOL/L (ref 3.5–5.1)
SODIUM BLD-SCNC: 138 MMOL/L (ref 136–145)

## 2021-03-04 PROCEDURE — 86900 BLOOD TYPING SEROLOGIC ABO: CPT

## 2021-03-04 PROCEDURE — 3700000000 HC ANESTHESIA ATTENDED CARE: Performed by: SURGERY

## 2021-03-04 PROCEDURE — 6370000000 HC RX 637 (ALT 250 FOR IP)

## 2021-03-04 PROCEDURE — P9047 ALBUMIN (HUMAN), 25%, 50ML: HCPCS | Performed by: NURSE ANESTHETIST, CERTIFIED REGISTERED

## 2021-03-04 PROCEDURE — 2709999900 HC NON-CHARGEABLE SUPPLY: Performed by: SURGERY

## 2021-03-04 PROCEDURE — 2580000003 HC RX 258: Performed by: NURSE PRACTITIONER

## 2021-03-04 PROCEDURE — 3700000001 HC ADD 15 MINUTES (ANESTHESIA): Performed by: SURGERY

## 2021-03-04 PROCEDURE — 86901 BLOOD TYPING SEROLOGIC RH(D): CPT

## 2021-03-04 PROCEDURE — 7100000010 HC PHASE II RECOVERY - FIRST 15 MIN: Performed by: SURGERY

## 2021-03-04 PROCEDURE — 36415 COLL VENOUS BLD VENIPUNCTURE: CPT

## 2021-03-04 PROCEDURE — 80048 BASIC METABOLIC PNL TOTAL CA: CPT

## 2021-03-04 PROCEDURE — 6360000002 HC RX W HCPCS: Performed by: SURGERY

## 2021-03-04 PROCEDURE — 36821 AV FUSION DIRECT ANY SITE: CPT | Performed by: SURGERY

## 2021-03-04 PROCEDURE — 2500000003 HC RX 250 WO HCPCS: Performed by: NURSE ANESTHETIST, CERTIFIED REGISTERED

## 2021-03-04 PROCEDURE — 2580000003 HC RX 258: Performed by: NURSE ANESTHETIST, CERTIFIED REGISTERED

## 2021-03-04 PROCEDURE — 2580000003 HC RX 258: Performed by: SURGERY

## 2021-03-04 PROCEDURE — 3600000014 HC SURGERY LEVEL 4 ADDTL 15MIN: Performed by: SURGERY

## 2021-03-04 PROCEDURE — 3600000004 HC SURGERY LEVEL 4 BASE: Performed by: SURGERY

## 2021-03-04 PROCEDURE — 86850 RBC ANTIBODY SCREEN: CPT

## 2021-03-04 PROCEDURE — 6370000000 HC RX 637 (ALT 250 FOR IP): Performed by: SURGERY

## 2021-03-04 PROCEDURE — 6360000002 HC RX W HCPCS: Performed by: NURSE PRACTITIONER

## 2021-03-04 PROCEDURE — 6370000000 HC RX 637 (ALT 250 FOR IP): Performed by: ANESTHESIOLOGY

## 2021-03-04 PROCEDURE — 7100000001 HC PACU RECOVERY - ADDTL 15 MIN: Performed by: SURGERY

## 2021-03-04 PROCEDURE — 6360000002 HC RX W HCPCS: Performed by: NURSE ANESTHETIST, CERTIFIED REGISTERED

## 2021-03-04 PROCEDURE — 7100000011 HC PHASE II RECOVERY - ADDTL 15 MIN: Performed by: SURGERY

## 2021-03-04 PROCEDURE — 7100000000 HC PACU RECOVERY - FIRST 15 MIN: Performed by: SURGERY

## 2021-03-04 DEVICE — CLIP INT SM WIDE RED TI TRNSVRS GRV CHEVRON SHP W/ PRECIS: Type: IMPLANTABLE DEVICE | Site: ARM | Status: FUNCTIONAL

## 2021-03-04 RX ORDER — FENTANYL CITRATE 50 UG/ML
INJECTION, SOLUTION INTRAMUSCULAR; INTRAVENOUS PRN
Status: DISCONTINUED | OUTPATIENT
Start: 2021-03-04 | End: 2021-03-04 | Stop reason: SDUPTHER

## 2021-03-04 RX ORDER — ROCURONIUM BROMIDE 10 MG/ML
INJECTION, SOLUTION INTRAVENOUS PRN
Status: DISCONTINUED | OUTPATIENT
Start: 2021-03-04 | End: 2021-03-04 | Stop reason: SDUPTHER

## 2021-03-04 RX ORDER — SODIUM CHLORIDE 0.9 % (FLUSH) 0.9 %
10 SYRINGE (ML) INJECTION PRN
Status: DISCONTINUED | OUTPATIENT
Start: 2021-03-04 | End: 2021-03-04 | Stop reason: HOSPADM

## 2021-03-04 RX ORDER — PROCHLORPERAZINE EDISYLATE 5 MG/ML
5 INJECTION INTRAMUSCULAR; INTRAVENOUS
Status: DISCONTINUED | OUTPATIENT
Start: 2021-03-04 | End: 2021-03-04 | Stop reason: HOSPADM

## 2021-03-04 RX ORDER — LABETALOL HYDROCHLORIDE 5 MG/ML
5 INJECTION, SOLUTION INTRAVENOUS EVERY 10 MIN PRN
Status: DISCONTINUED | OUTPATIENT
Start: 2021-03-04 | End: 2021-03-04 | Stop reason: HOSPADM

## 2021-03-04 RX ORDER — ALBUMIN (HUMAN) 12.5 G/50ML
SOLUTION INTRAVENOUS PRN
Status: DISCONTINUED | OUTPATIENT
Start: 2021-03-04 | End: 2021-03-04 | Stop reason: SDUPTHER

## 2021-03-04 RX ORDER — SODIUM CHLORIDE 0.9 % (FLUSH) 0.9 %
10 SYRINGE (ML) INJECTION EVERY 12 HOURS SCHEDULED
Status: DISCONTINUED | OUTPATIENT
Start: 2021-03-04 | End: 2021-03-04 | Stop reason: HOSPADM

## 2021-03-04 RX ORDER — PROPOFOL 10 MG/ML
INJECTION, EMULSION INTRAVENOUS PRN
Status: DISCONTINUED | OUTPATIENT
Start: 2021-03-04 | End: 2021-03-04 | Stop reason: SDUPTHER

## 2021-03-04 RX ORDER — SODIUM CHLORIDE 9 MG/ML
INJECTION, SOLUTION INTRAVENOUS CONTINUOUS
Status: DISCONTINUED | OUTPATIENT
Start: 2021-03-04 | End: 2021-03-04 | Stop reason: HOSPADM

## 2021-03-04 RX ORDER — ONDANSETRON 2 MG/ML
4 INJECTION INTRAMUSCULAR; INTRAVENOUS
Status: DISCONTINUED | OUTPATIENT
Start: 2021-03-04 | End: 2021-03-04 | Stop reason: HOSPADM

## 2021-03-04 RX ORDER — LIDOCAINE HYDROCHLORIDE 20 MG/ML
INJECTION, SOLUTION INFILTRATION; PERINEURAL PRN
Status: DISCONTINUED | OUTPATIENT
Start: 2021-03-04 | End: 2021-03-04 | Stop reason: SDUPTHER

## 2021-03-04 RX ORDER — FENTANYL CITRATE 50 UG/ML
25 INJECTION, SOLUTION INTRAMUSCULAR; INTRAVENOUS EVERY 5 MIN PRN
Status: DISCONTINUED | OUTPATIENT
Start: 2021-03-04 | End: 2021-03-04 | Stop reason: HOSPADM

## 2021-03-04 RX ORDER — SODIUM CHLORIDE, SODIUM LACTATE, POTASSIUM CHLORIDE, CALCIUM CHLORIDE 600; 310; 30; 20 MG/100ML; MG/100ML; MG/100ML; MG/100ML
INJECTION, SOLUTION INTRAVENOUS CONTINUOUS
Status: DISCONTINUED | OUTPATIENT
Start: 2021-03-04 | End: 2021-03-04 | Stop reason: HOSPADM

## 2021-03-04 RX ORDER — ONDANSETRON 2 MG/ML
INJECTION INTRAMUSCULAR; INTRAVENOUS PRN
Status: DISCONTINUED | OUTPATIENT
Start: 2021-03-04 | End: 2021-03-04 | Stop reason: SDUPTHER

## 2021-03-04 RX ORDER — VASOPRESSIN 20 U/ML
INJECTION PARENTERAL PRN
Status: DISCONTINUED | OUTPATIENT
Start: 2021-03-04 | End: 2021-03-04 | Stop reason: SDUPTHER

## 2021-03-04 RX ORDER — LIDOCAINE HYDROCHLORIDE 10 MG/ML
1 INJECTION, SOLUTION EPIDURAL; INFILTRATION; INTRACAUDAL; PERINEURAL
Status: DISCONTINUED | OUTPATIENT
Start: 2021-03-04 | End: 2021-03-04 | Stop reason: HOSPADM

## 2021-03-04 RX ORDER — DEXAMETHASONE SODIUM PHOSPHATE 4 MG/ML
INJECTION, SOLUTION INTRA-ARTICULAR; INTRALESIONAL; INTRAMUSCULAR; INTRAVENOUS; SOFT TISSUE PRN
Status: DISCONTINUED | OUTPATIENT
Start: 2021-03-04 | End: 2021-03-04 | Stop reason: SDUPTHER

## 2021-03-04 RX ORDER — HYDRALAZINE HYDROCHLORIDE 20 MG/ML
5 INJECTION INTRAMUSCULAR; INTRAVENOUS EVERY 10 MIN PRN
Status: DISCONTINUED | OUTPATIENT
Start: 2021-03-04 | End: 2021-03-04 | Stop reason: HOSPADM

## 2021-03-04 RX ORDER — OXYCODONE HYDROCHLORIDE 5 MG/1
5 TABLET ORAL EVERY 6 HOURS PRN
Qty: 20 TABLET | Refills: 0 | Status: SHIPPED | OUTPATIENT
Start: 2021-03-04 | End: 2021-03-09

## 2021-03-04 RX ORDER — HYDROCODONE BITARTRATE AND ACETAMINOPHEN 5; 325 MG/1; MG/1
1 TABLET ORAL
Status: COMPLETED | OUTPATIENT
Start: 2021-03-04 | End: 2021-03-04

## 2021-03-04 RX ORDER — HYDROMORPHONE HCL 110MG/55ML
0.25 PATIENT CONTROLLED ANALGESIA SYRINGE INTRAVENOUS EVERY 5 MIN PRN
Status: DISCONTINUED | OUTPATIENT
Start: 2021-03-04 | End: 2021-03-04 | Stop reason: HOSPADM

## 2021-03-04 RX ORDER — HEPARIN SODIUM 1000 [USP'U]/ML
INJECTION, SOLUTION INTRAVENOUS; SUBCUTANEOUS PRN
Status: DISCONTINUED | OUTPATIENT
Start: 2021-03-04 | End: 2021-03-04 | Stop reason: SDUPTHER

## 2021-03-04 RX ADMIN — ROCURONIUM BROMIDE 20 MG: 10 INJECTION, SOLUTION INTRAVENOUS at 13:06

## 2021-03-04 RX ADMIN — HEPARIN SODIUM 5000 UNITS: 1000 INJECTION INTRAVENOUS; SUBCUTANEOUS at 13:43

## 2021-03-04 RX ADMIN — SUGAMMADEX 200 MG: 100 INJECTION, SOLUTION INTRAVENOUS at 14:15

## 2021-03-04 RX ADMIN — ROCURONIUM BROMIDE 30 MG: 10 INJECTION, SOLUTION INTRAVENOUS at 12:55

## 2021-03-04 RX ADMIN — HYDROCODONE BITARTRATE AND ACETAMINOPHEN 1 TABLET: 5; 325 TABLET ORAL at 15:22

## 2021-03-04 RX ADMIN — FENTANYL CITRATE 25 MCG: 50 INJECTION INTRAMUSCULAR; INTRAVENOUS at 13:16

## 2021-03-04 RX ADMIN — VASOPRESSIN 1 UNITS: 20 INJECTION INTRAVENOUS at 13:47

## 2021-03-04 RX ADMIN — DEXAMETHASONE SODIUM PHOSPHATE 4 MG: 4 INJECTION, SOLUTION INTRAMUSCULAR; INTRAVENOUS at 13:06

## 2021-03-04 RX ADMIN — PROPOFOL 50 MG: 10 INJECTION, EMULSION INTRAVENOUS at 12:54

## 2021-03-04 RX ADMIN — ALBUMIN (HUMAN) 12.5 G: 5 SOLUTION INTRAVENOUS at 13:55

## 2021-03-04 RX ADMIN — CEFAZOLIN SODIUM 2000 MG: 10 INJECTION, POWDER, FOR SOLUTION INTRAVENOUS at 12:46

## 2021-03-04 RX ADMIN — ONDANSETRON 4 MG: 2 INJECTION INTRAMUSCULAR; INTRAVENOUS at 13:06

## 2021-03-04 RX ADMIN — PHENYLEPHRINE HYDROCHLORIDE 100 MCG: 10 INJECTION INTRAVENOUS at 13:01

## 2021-03-04 RX ADMIN — PHENYLEPHRINE HYDROCHLORIDE 100 MCG: 10 INJECTION INTRAVENOUS at 12:57

## 2021-03-04 RX ADMIN — VASOPRESSIN 1 UNITS: 20 INJECTION INTRAVENOUS at 13:54

## 2021-03-04 RX ADMIN — LIDOCAINE HYDROCHLORIDE 100 MG: 20 INJECTION, SOLUTION INFILTRATION; PERINEURAL at 12:54

## 2021-03-04 RX ADMIN — FENTANYL CITRATE 50 MCG: 50 INJECTION INTRAMUSCULAR; INTRAVENOUS at 12:54

## 2021-03-04 RX ADMIN — PHENYLEPHRINE HYDROCHLORIDE 30 MCG/MIN: 10 INJECTION INTRAVENOUS at 13:03

## 2021-03-04 RX ADMIN — SODIUM CHLORIDE: 9 INJECTION, SOLUTION INTRAVENOUS at 11:22

## 2021-03-04 ASSESSMENT — PULMONARY FUNCTION TESTS
PIF_VALUE: 19
PIF_VALUE: 21
PIF_VALUE: 19
PIF_VALUE: 20
PIF_VALUE: 2
PIF_VALUE: 12
PIF_VALUE: 18
PIF_VALUE: 21
PIF_VALUE: 14
PIF_VALUE: 21
PIF_VALUE: 22
PIF_VALUE: 2
PIF_VALUE: 17
PIF_VALUE: 19
PIF_VALUE: 19
PIF_VALUE: 22
PIF_VALUE: 22
PIF_VALUE: 18
PIF_VALUE: 2
PIF_VALUE: 19
PIF_VALUE: 2
PIF_VALUE: 2
PIF_VALUE: 14
PIF_VALUE: 3
PIF_VALUE: 2
PIF_VALUE: 18
PIF_VALUE: 21
PIF_VALUE: 22
PIF_VALUE: 19
PIF_VALUE: 18
PIF_VALUE: 19
PIF_VALUE: 22
PIF_VALUE: 18
PIF_VALUE: 19
PIF_VALUE: 18
PIF_VALUE: 20
PIF_VALUE: 22
PIF_VALUE: 19
PIF_VALUE: 18
PIF_VALUE: 2
PIF_VALUE: 26
PIF_VALUE: 20
PIF_VALUE: 18
PIF_VALUE: 20
PIF_VALUE: 18
PIF_VALUE: 19
PIF_VALUE: 18
PIF_VALUE: 22
PIF_VALUE: 19
PIF_VALUE: 22
PIF_VALUE: 1
PIF_VALUE: 22
PIF_VALUE: 22
PIF_VALUE: 1

## 2021-03-04 ASSESSMENT — ENCOUNTER SYMPTOMS: SHORTNESS OF BREATH: 1

## 2021-03-04 NOTE — PROGRESS NOTES
Discharge instructions completed with patient and family. IV removed. Paper prescription provided.  Patient wheeled to car per PCA

## 2021-03-04 NOTE — PROGRESS NOTES
Patient resting quietly in bed. Patient meets discharge criteria for phase 1. Seen by anesthesia. OK to transfer to phase 2.

## 2021-03-04 NOTE — OP NOTE
vein.    The artery was occluded with vascular clamps and an arteriotomy was made. An end of vein to side of the artery anastomosis was created with 6-0 prolene suture in a parachute technique. The distal arterial clamp was removed and inspection of the suture line for bleeding was made. The proximal arterial clamp was removed. Hemostasis was obtained. The course of the outflow vein was checked to remove any fascial bands and to assure that there were no kinks or twists. Wound closure was accomplished with 3-0 Vicryl in the superficial fascia and 4-0 Vicryl in the skin. A gauze and Tegaderm dressing was applied. The patient was transferred to the [address] [place] [address]. Unit in good condition. Elissa Landa M.D., FACS.  3/4/2021  2:14 PM      Electronically signed by Kirsten Crews MD on 3/4/2021 at 2:13 PM

## 2021-03-04 NOTE — PROGRESS NOTES
Patient received to PACu in stable condition. Suctioned on arrival per CRNA. Dressing to arm clean dry and intact. Will continue to monitor.

## 2021-03-04 NOTE — ANESTHESIA POSTPROCEDURE EVALUATION
Department of Anesthesiology  Postprocedure Note    Patient: Ana Cristina Wheeler MRN: [id number]  YOB: 1945  Date of evaluation: 3/4/2021  Time:  3:09 PM     Procedure Summary     Date: 03/04/21 Room / Location: [address]    Anesthesia Start: 2733 Anesthesia Stop: 8435    Procedure: LEFT BACHIAL CEPHALIC FISTULA CREATION (Left ) Diagnosis: (N18.6 END STAGE RENAL DISEASE)    Surgeons: Bibi Lowry MD Responsible Provider: Mily Winn MD    Anesthesia Type: general ASA Status: 4          Anesthesia Type: general    Becky Phase I: Becky Score: 10    Becky Phase II:      Last vitals: Reviewed and per EMR flowsheets.        Anesthesia Post Evaluation    Patient location during evaluation: PACU  Patient participation: complete - patient participated  Level of consciousness: awake and alert  Airway patency: patent  Nausea & Vomiting: no nausea and no vomiting  Complications: no  Cardiovascular status: hemodynamically stable  Respiratory status: acceptable  Hydration status: stable

## 2021-03-04 NOTE — ANESTHESIA PRE PROCEDURE
Department of Anesthesiology  Preprocedure Note       Name:  Selin Bee. Age:  77 y.o.  :  1945                                          MRN:  [id number]         Date:  3/4/2021      Surgeon: Greta Velazquez):  Kuldip Roberts MD    Procedure: Procedure(s):  LEFT RADIO CEPHALIC FISTULA CREATION    Medications prior to admission:   Prior to Admission medications    Medication Sig Start Date End Date Taking?  Authorizing Provider   Ferric Citrate 1  MG(Fe) TABS Take by mouth 2 times daily    Historical Provider, MD   Multiple Vitamins-Minerals (RENAPLEX-D) TABS Take by mouth daily    Historical Provider, MD   sertraline (ZOLOFT) 50 MG tablet Take 1 tablet by mouth daily 20   Guevara Posey MD   amiodarone (CORDARONE) 200 MG tablet Take 1 tablet by mouth daily  Patient taking differently: Take 200 mg by mouth nightly  20   Guevara Posey MD   torsemide (DEMADEX) 20 MG tablet Take 1 tablet by mouth See Admin Instructions 2 pills daily at 6 am and one daily at noon  Patient taking differently: Take 20 mg by mouth See Admin Instructions 2 pills daily at 6 am and one daily at noon - not taking 5/15/20   Valentin MD Abdifatah   apixaban (ELIQUIS) 2.5 MG TABS tablet Take by mouth 2 times daily    Historical Provider, MD   hydrALAZINE (APRESOLINE) 10 MG tablet Take 1 tablet by mouth 3 times daily  Patient taking differently: Take 10 mg by mouth 3 times daily Not taking 5/15/20   Guevara Posey MD   allopurinol (ZYLOPRIM) 100 MG tablet Take 1 tablet by mouth daily  Patient taking differently: Take 100 mg by mouth daily Not taking 19   Brinda Delgadillo MD   lovastatin (MEVACOR) 40 MG tablet TAKE ONE TABLET BY MOUTH EVERY NIGHT AT BEDTIME  Patient taking differently: Not taking 19   Guevara Posey MD       Current medications:    Current Facility-Administered Medications   Medication Dose Route Frequency Provider Last Rate Last Admin    0.9 % sodium chloride infusion   Intravenous Continuous Yamila Vazquez quittin.3    Smokeless tobacco: Never Used    Tobacco comment: pt has cut down   Substance Use Topics    Alcohol use: No     Alcohol/week: 0.0 standard drinks                                Ready to quit: Not Answered  Counseling given: Not Answered  Comment: pt has cut down      Vital Signs (Current): There were no vitals filed for this visit. BP Readings from Last 3 Encounters:   21 134/70   20 120/88   20 110/80       NPO Status:                                                                                 BMI:   Wt Readings from Last 3 Encounters:   21 152 lb (68.9 kg)   21 156 lb 1.4 oz (70.8 kg)   20 156 lb (70.8 kg)     There is no height or weight on file to calculate BMI.    CBC:   Lab Results   Component Value Date    WBC 5.0 2021    RBC 4.05 2021    HGB 13.6 2021    HCT 42.7 2021    .5 2021    RDW 17.6 2021     2021       CMP:   Lab Results   Component Value Date     2021    K 4.5 2021    CL 97 2021    CO2 29 2021    BUN 22 2021    CREATININE 6.3 2021    GFRAA 11 2021    AGRATIO 1.2 2019    LABGLOM 9 2021    GLUCOSE 100 2021    PROT 5.6 2019    CALCIUM 10.0 2021    BILITOT 1.5 2019    ALKPHOS 103 2019    AST 14 2019    ALT 10 2019       POC Tests: No results for input(s): POCGLU, POCNA, POCK, POCCL, POCBUN, POCHEMO, POCHCT in the last 72 hours.     Coags:   Lab Results   Component Value Date    PROTIME 14.6 2021    INR 1.26 2021    APTT 37.6 2021       HCG (If Applicable): No results found for: PREGTESTUR, PREGSERUM, HCG, HCGQUANT     ABGs: No results found for: PHART, PO2ART, FTW4CIT, IEY9HQK, BEART, W1PTAFQY     Type & Screen (If Applicable):  No results found for: LABABO, LABRH    Drug/Infectious Status (If Applicable):  No results found for: HIV, HEPCAB    COVID-19 Screening (If Applicable):   Lab Results   Component Value Date    COVID19 Not Detected 02/26/2021    COVID19 NOT DETECTED 02/12/2021         Anesthesia Evaluation  Patient summary reviewed and Nursing notes reviewed no history of anesthetic complications:   Airway: Mallampati: II  TM distance: >3 FB   Neck ROM: full  Mouth opening: > = 3 FB Dental:    (+) upper dentures and lower dentures      Pulmonary:   (+) shortness of breath: chronic,      (-) asthma                           Cardiovascular:  Exercise tolerance: poor (<4 METS),   (+) hypertension:, pacemaker: pacemaker and AICD, CHF:, hyperlipidemia    (-)  angina             ROS comment: 4/20    Summary:  The left atrium is mildly dilated. The estimated right ventricular systolic pressure is consistent with mild  pulmonary hypertension. The left ventricle is mildly dilated. There is mild concentric left ventricular hypertrophy. Trace pericardial effusion. The mitral valve leaflets are mildly thickened in appearance. The mitral valve chordae are thickened and/or calcified. There is a pacemaker lead in the right ventricle. The right atrium is moderately dilated. Moderate tricuspid regurgitation is present. Mild pulmonic valvular regurgitation. Overall left ventricular ejection fraction is estimated to be < 20%. There is severe global hypokinesis of the left ventricle. There is an abnormal contraction sequence due to paced rhythm. The diastolic function is impaired and classified as Grade 2  (psuedonormalization). The right ventricle is moderately dilated. There is mild to moderate mitral regurgitation. The left ventricular function is severely reduced. The right ventricular systolic function is mildly reduced. There is no tricuspid valve vegetation.      Neuro/Psych:   (+) TIA,    (-) CVA           GI/Hepatic/Renal:   (+) renal disease: ESRD and dialysis,      (-) GERD and liver disease       Endo/Other:        (-) diabetes mellitus, hypothyroidism               Abdominal:           Vascular:     - PVD. Anesthesia Plan      general     ASA 4     (Eliquis yesterday, not block candidate; +/- art line if poor hemodynamics given cardiomyopathy)  Induction: intravenous. MIPS: Postoperative opioids intended and Prophylactic antiemetics administered. Anesthetic plan and risks discussed with patient. Use of blood products discussed with patient whom. Plan discussed with CRNA.                 Navid Hills MD   3/4/2021

## 2021-03-04 NOTE — H&P
Consultation/History & Physical    Date of Admission:  3/4/2021  Date of Consultation:  3/4/2021    PCP:  Remedios Weiss MD     Chief Complaint: ESRD    History of Present Illness:  Willa Leos is a 77 y.o. male who presents with ESRD. Presents today for access creation. PMH:   has a past medical history of Cardiomyopathy ([facility] Cerebrovascular disease, CHF (congestive heart failure) ([facility] [address] Dialysis patient ([facility] [address] H/O medication noncompliance, HTN (hypertension), Other and unspecified hyperlipidemia, Pacemaker, Presence of implantable cardioverter-defibrillator (ICD), Sleep disorder, Tachycardia, and TIA (transient ischemic attack). PSH:   has a past surgical history that includes Cardiac defibrillator placement (2013); pacemaker placement (2013); and hernia repair (Bilateral, [id number]). Allergies:  No Known Allergies     Home Meds:    Prior to Admission medications    Medication Sig Start Date End Date Taking?  Authorizing Provider   Ferric Citrate 1  MG(Fe) TABS Take by mouth 2 times daily   Yes Historical Provider, MD   Multiple Vitamins-Minerals (RENAPLEX-D) TABS Take by mouth daily   Yes Historical Provider, MD   sertraline (ZOLOFT) 50 MG tablet Take 1 tablet by mouth daily 7/31/20  Yes Remedios Weiss MD   amiodarone (CORDARONE) 200 MG tablet Take 1 tablet by mouth daily  Patient taking differently: Take 200 mg by mouth nightly  5/18/20  Yes Remedios Weiss MD   torsemide (DEMADEX) 20 MG tablet Take 1 tablet by mouth See Admin Instructions 2 pills daily at 6 am and one daily at noon  Patient taking differently: Take 20 mg by mouth See Admin Instructions 2 pills daily at 6 am and one daily at noon - not taking 5/15/20  Yes Remedios Weiss MD   apixaban (ELIQUIS) 2.5 MG TABS tablet Take by mouth 2 times daily   Yes Historical Provider, MD   hydrALAZINE (APRESOLINE) 10 MG tablet Take 1 tablet by mouth 3 times daily  Patient taking differently: Take 10 mg by mouth 3 times daily Not taking 5/15/20  Yes Juvencio Gordon MD   allopurinol (ZYLOPRIM) 100 MG tablet Take 1 tablet by mouth daily  Patient taking differently: Take 100 mg by mouth daily Not taking 8/28/19  Yes Viviane Pineda MD   lovastatin (MEVACOR) 40 MG tablet TAKE ONE TABLET BY MOUTH EVERY NIGHT AT BEDTIME  Patient taking differently: Not taking 8/12/19  Yes Juvencio Gordon MD        Hospital Meds:    Current Facility-Administered Medications   Medication Dose Route Frequency Provider Last Rate Last Admin    0.9 % sodium chloride infusion   Intravenous Continuous TONYA Carlton  mL/hr at 03/04/21 1122 New Bag at 03/04/21 1122    ceFAZolin (ANCEF) 2000 mg in dextrose 5 % 100 mL IVPB  2 g Intravenous On Call to [address] [place] [address], APRN - CNP        sodium chloride flush 0.9 % injection 10 mL  10 mL Intravenous 2 times per day TONYA Carlton CNP        sodium chloride flush 0.9 % injection 10 mL  10 mL Intravenous PRN TONYA Carlton CNP        fentaNYL (SUBLIMAZE) injection 25 mcg  25 mcg Intravenous Q5 Min PRN [facility], MD        HYDROmorphone (DILAUDID) injection 0.25 mg  0.25 mg Intravenous Q5 Min PRN [facility], MD        HYDROcodone-acetaminophen (NORCO) 5-325 MG per tablet 1 tablet  1 tablet Oral Once PRN [facility], MD        ondansetron [facility]) injection 4 mg  4 mg Intravenous Once PRN [facility], MD        prochlorperazine (COMPAZINE) injection 5 mg  5 mg Intravenous Once PRN [facility], MD        labetalol (NORMODYNE;TRANDATE) injection 5 mg  5 mg Intravenous Q10 Min PRN [facility], MD        hydrALAZINE (APRESOLINE) injection 5 mg  5 mg Intravenous Q10 Min PRN [facility], MD           Social History:       Social History     Socioeconomic History    Marital status:      Spouse name: Not on file    Number of children: 3    Years of education: Not on file    Highest education level: Not on file Occupational History    Occupation:     Social Needs    Financial resource strain: Not on file    Food insecurity     Worry: Not on file     Inability: Not on file   [employer] needs     Medical: Not on file     Non-medical: Not on file   Tobacco Use    Smoking status: Current Every Day Smoker     Packs/day: 0.25     Years: 30.00     Pack years: 7.50     Types: Cigarettes     Last attempt to quit: 10/13/2018     Years since quittin.3    Smokeless tobacco: Never Used    Tobacco comment: pt has cut down   Substance and Sexual Activity    Alcohol use: No     Alcohol/week: 0.0 standard drinks    Drug use: No    Sexual activity: Not on file   Lifestyle    Physical activity     Days per week: Not on file     Minutes per session: Not on file    Stress: Not on file   Relationships    Social connections     Talks on phone: Not on file     Gets together: Not on file     Attends Orthodox service: Not on file     Active member of club or organization: Not on file     Attends meetings of clubs or organizations: Not on file     Relationship status: Not on file    Intimate partner violence     Fear of current or ex partner: Not on file     Emotionally abused: Not on file     Physically abused: Not on file     Forced sexual activity: Not on file   Other Topics Concern    Not on file   Social History Narrative    He follows [employer] and field sports. 3 children and 5 grandchildren.         Family Histroy:      Family History   Problem Relation Age of Onset    Osteoarthritis Father     Hypertension Father     Cancer Father         prostate, rectal    Stroke Father     ADHD Sister        Review of Systems:  Review of systems performed and negative with the exception of the above findings        Physical Exam   Vital Signs: BP (!) 145/87   Pulse 79   Temp 97.1 °F (36.2 °C) (Temporal)   Resp 20   Wt 133 lb 8 oz (60.6 kg)   SpO2 94%   BMI 21.55 kg/m²        Admission Weight: 133 lb 8 oz (60.6 kg) General appearance: alert, appears stated age, cooperative and no distress  Head: Normocephalic, without obvious abnormality, atraumatic  Lungs: clear to auscultation bilaterally  Heart: regular rate and rhythm, S1, S2 normal, no murmur, click, rub or gallop  Abdomen: soft, non-tender.  Bowel sounds normal. No masses,  no organomegaly  Extremities: extremities normal, atraumatic, no cyanosis or edema  Pulses:      Labs:    BMP:   Lab Results   Component Value Date     03/04/2021    K 5.0 03/04/2021    CL 97 03/04/2021    CO2 28 03/04/2021    PHOS 3.9 03/07/2018    BUN 27 03/04/2021    CREATININE 0.5 03/04/2021      No components found for: GLUNo results found for: MG   CBC:   Lab Results   Component Value Date    WBC 5.0 02/12/2021    HGB 13.6 02/12/2021    HCT 42.7 02/12/2021    .5 02/12/2021     02/12/2021      Coagulation:   Lab Results   Component Value Date    INR 1.26 02/12/2021    APTT 37.6 02/12/2021     Cardiac markers:   No results found for: CKMB, CKTOTAL, TROPONINI, MYOGLOBIN  No results found for: LABA1C No results found for: ALB    Lab Results   Component Value Date    BILITOT 1.5 12/03/2019    AST 14 12/03/2019    ALT 10 12/03/2019    ALKPHOS 103 12/03/2019      Lab Results   Component Value Date    CHOL 166 12/03/2019    HDL 68 12/03/2019    LDLCALC 84 12/03/2019    TRIG 69 12/03/2019          Diagnosis:  Patient Active Problem List   Diagnosis    Cardiomyopathy ([facility] [address]    Tachycardia    TIA (transient ischemic attack)    Essential hypertension    Chronic systolic congestive heart failure (HCC)    Iron deficiency anemia due to chronic blood loss    Sleep disorder    CRF (chronic renal failure), stage 3 (moderate)    Mixed hyperlipidemia    Moderate episode of recurrent major depressive disorder (HCC)    Paroxysmal ventricular tachycardia (HCC)    Chronic kidney disease (CKD), stage IV (severe) ([address]    ESRD on dialysis ([facility] [address]           Plan:  Left radiocephalic lizy Barajas M.D., FACS.  3/4/2021  12:36 PM

## 2021-03-15 DIAGNOSIS — F33.1 MODERATE EPISODE OF RECURRENT MAJOR DEPRESSIVE DISORDER (HCC): ICD-10-CM

## 2021-03-23 ENCOUNTER — OFFICE VISIT (OUTPATIENT)
Dept: VASCULAR SURGERY | Age: 76
End: 2021-03-23

## 2021-03-23 VITALS
HEIGHT: 66 IN | BODY MASS INDEX: 23.3 KG/M2 | TEMPERATURE: 97 F | WEIGHT: 145 LBS | SYSTOLIC BLOOD PRESSURE: 118 MMHG | DIASTOLIC BLOOD PRESSURE: 76 MMHG

## 2021-03-23 DIAGNOSIS — N18.6 ESRD ON DIALYSIS (HCC): Primary | ICD-10-CM

## 2021-03-23 DIAGNOSIS — Z99.2 ESRD ON DIALYSIS (HCC): Primary | ICD-10-CM

## 2021-03-23 PROCEDURE — 99024 POSTOP FOLLOW-UP VISIT: CPT | Performed by: SURGERY

## 2021-03-23 NOTE — PROGRESS NOTES
[facility])   Vascular Surgery Followup    Referring Provider:  Nicole Garza MD     Chief Complaint   Patient presents with    Post-Op Check        History of Present Illness:  78-year-old male here today for his first postoperative visit following left brachiocephalic fistula creation on March 4. He is doing very well and has no complaints today. Denies pain in his hand or arm. Past Medical History:   has a past medical history of Cardiomyopathy ([address] Cerebrovascular disease, CHF (congestive heart failure) ([facility] [address] Dialysis patient ([facility] [address] H/O medication noncompliance, HTN (hypertension), Other and unspecified hyperlipidemia, Pacemaker, Presence of implantable cardioverter-defibrillator (ICD), Sleep disorder, Tachycardia, and TIA (transient ischemic attack). Surgical History:   has a past surgical history that includes Cardiac defibrillator placement (2013); pacemaker placement (2013); hernia repair (Bilateral, [id number]); and Dialysis fistula creation (Left, 3/4/2021). Social History:   reports that he has been smoking cigarettes. He has a 7.50 pack-year smoking history. He has never used smokeless tobacco. He reports that he does not drink alcohol or use drugs. Family History:  family history includes ADHD in his sister; Cancer in his father; Hypertension in his father; Osteoarthritis in his father; Stroke in his father. Home Medications:  Current Outpatient Medications   Medication Sig Dispense Refill    sertraline (ZOLOFT) 50 MG tablet TAKE ONE TABLET BY MOUTH DAILY.  DISCONTINUE FLUOXETINE 90 tablet 4    Ferric Citrate 1  MG(Fe) TABS Take by mouth 2 times daily      Multiple Vitamins-Minerals (RENAPLEX-D) TABS Take by mouth daily      amiodarone (CORDARONE) 200 MG tablet Take 1 tablet by mouth daily (Patient taking differently: Take 200 mg by mouth nightly ) 30 tablet 5    torsemide (DEMADEX) 20 MG tablet Take 1 tablet by mouth See Admin Instructions 2 pills daily at 6 am and one daily at noon (Patient taking differently: Take 20 mg by mouth See Admin Instructions 2 pills daily at 6 am and one daily at noon - not taking) 90 tablet 5    apixaban (ELIQUIS) 2.5 MG TABS tablet Take by mouth 2 times daily      hydrALAZINE (APRESOLINE) 10 MG tablet Take 1 tablet by mouth 3 times daily (Patient taking differently: Take 10 mg by mouth 3 times daily Not taking) 90 tablet 5    allopurinol (ZYLOPRIM) 100 MG tablet Take 1 tablet by mouth daily (Patient taking differently: Take 100 mg by mouth daily Not taking) 90 tablet 0    lovastatin (MEVACOR) 40 MG tablet TAKE ONE TABLET BY MOUTH EVERY NIGHT AT BEDTIME (Patient taking differently: Not taking) 90 tablet 2     No current facility-administered medications for this visit. Allergies:  Patient has no known allergies. Review of Systems:   · Constitutional: there has been no unanticipated weight loss. There's been no change in energy level, sleep pattern, or activity level. · Eyes: No visual changes or diplopia. No scleral icterus. · ENT: No Headaches, hearing loss or vertigo. No mouth sores or sore throat. · Cardiovascular: Reviewed in HPI  · Respiratory: No cough or wheezing, no sputum production. No hematemesis. · Gastrointestinal: No abdominal pain, appetite loss, blood in stools. No change in bowel or bladder habits. · Genitourinary: No dysuria, trouble voiding, or hematuria. · Musculoskeletal:  No gait disturbance, weakness or joint complaints. · Integumentary: No rash or pruritis. · Neurological: No headache, diplopia, change in muscle strength, numbness or tingling. No change in gait, balance, coordination, mood, affect, memory, mentation, behavior. · Psychiatric: No anxiety, no depression. · Endocrine: No malaise, fatigue or temperature intolerance. No excessive thirst, fluid intake, or urination. No tremor.   · Hematologic/Lymphatic: No abnormal bruising or bleeding, blood clots or swollen lymph nodes.  · Allergic/Immunologic: No nasal congestion or hives. Physical Examination:    There were no vitals filed for this visit. General appearance: alert, appears stated age, cooperative and no distress  Left antecubital incision well-healed and approximated. Excellent thrill and bruit in the cephalic vein. Palpable distal pulse    Assessment:     Patient Active Problem List   Diagnosis    Cardiomyopathy ([address]    Tachycardia    TIA (transient ischemic attack)    Essential hypertension    Chronic systolic congestive heart failure (HCC)    Iron deficiency anemia due to chronic blood loss    Sleep disorder    ESRD (end stage renal disease) ([address]    Mixed hyperlipidemia    Moderate episode of recurrent major depressive disorder (HCC)    Paroxysmal ventricular tachycardia (HCC)    Chronic kidney disease (CKD), stage IV (severe) ([address]    ESRD on dialysis ([address]       Plan:  71-year-old male with clinically patent left brachiocephalic fistula. It is maturing very well. We'll see him again in 2 weeks and would anticipate it is able to be accessed at that time. Thank you for allowing me to participate in the care of this individual.  Please do not hesitate to contact me with any questions. Suzanne Oneal M.D., FACS.  3/23/2021  12:00 PM

## 2021-04-06 ENCOUNTER — OFFICE VISIT (OUTPATIENT)
Dept: ORTHOPEDIC SURGERY | Age: 76
End: 2021-04-06
Payer: MEDICARE

## 2021-04-06 ENCOUNTER — OFFICE VISIT (OUTPATIENT)
Dept: INTERNAL MEDICINE CLINIC | Age: 76
End: 2021-04-06
Payer: MEDICARE

## 2021-04-06 VITALS
HEIGHT: 66 IN | OXYGEN SATURATION: 98 % | DIASTOLIC BLOOD PRESSURE: 78 MMHG | HEART RATE: 77 BPM | TEMPERATURE: 98.6 F | SYSTOLIC BLOOD PRESSURE: 112 MMHG | BODY MASS INDEX: 22.98 KG/M2 | RESPIRATION RATE: 14 BRPM | WEIGHT: 143 LBS

## 2021-04-06 VITALS — BODY MASS INDEX: 22.98 KG/M2 | WEIGHT: 143 LBS | HEIGHT: 66 IN

## 2021-04-06 DIAGNOSIS — M25.511 BILATERAL SHOULDER PAIN, UNSPECIFIED CHRONICITY: ICD-10-CM

## 2021-04-06 DIAGNOSIS — M19.012 LOCALIZED PRIMARY OSTEOARTHRITIS OF LEFT SHOULDER REGION: ICD-10-CM

## 2021-04-06 DIAGNOSIS — I10 ESSENTIAL HYPERTENSION: Primary | ICD-10-CM

## 2021-04-06 DIAGNOSIS — S43.006D DISLOCATION OF SHOULDER REGION, UNSPECIFIED LATERALITY, SUBSEQUENT ENCOUNTER: ICD-10-CM

## 2021-04-06 DIAGNOSIS — S43.101A ACROMIOCLAVICULAR JOINT SEPARATION, TYPE 3, RIGHT, INITIAL ENCOUNTER: ICD-10-CM

## 2021-04-06 DIAGNOSIS — M75.112 NONTRAUMATIC INCOMPLETE TEAR OF LEFT ROTATOR CUFF: Primary | ICD-10-CM

## 2021-04-06 DIAGNOSIS — M25.512 BILATERAL SHOULDER PAIN, UNSPECIFIED CHRONICITY: ICD-10-CM

## 2021-04-06 DIAGNOSIS — M67.912 ROTATOR CUFF DYSFUNCTION, LEFT: ICD-10-CM

## 2021-04-06 DIAGNOSIS — M75.82 TENDINITIS OF LEFT ROTATOR CUFF: ICD-10-CM

## 2021-04-06 PROBLEM — T78.2XXA ANAPHYLACTIC SHOCK, UNSPECIFIED, INITIAL ENCOUNTER: Status: ACTIVE | Noted: 2020-10-06

## 2021-04-06 PROBLEM — Z86.73 HISTORY OF TIA (TRANSIENT ISCHEMIC ATTACK): Status: ACTIVE | Noted: 2018-10-13

## 2021-04-06 PROBLEM — Z49.01 ENCOUNTER FOR FITTING AND ADJUSTMENT OF EXTRACORPOREAL DIALYSIS CATHETER (HCC): Status: ACTIVE | Noted: 2020-10-01

## 2021-04-06 PROBLEM — Z71.89 GOALS OF CARE, COUNSELING/DISCUSSION: Status: ACTIVE | Noted: 2021-04-06

## 2021-04-06 PROBLEM — T78.40XA ALLERGY, UNSPECIFIED, INITIAL ENCOUNTER: Status: ACTIVE | Noted: 2020-10-06

## 2021-04-06 PROBLEM — Z86.79 HISTORY OF VENTRICULAR TACHYCARDIA: Status: ACTIVE | Noted: 2018-10-13

## 2021-04-06 PROBLEM — K21.9 GERD (GASTROESOPHAGEAL REFLUX DISEASE): Status: ACTIVE | Noted: 2018-10-13

## 2021-04-06 PROBLEM — Z99.2 DEPENDENCE ON RENAL DIALYSIS (HCC): Status: ACTIVE | Noted: 2020-10-01

## 2021-04-06 PROBLEM — Z79.01 CURRENT USE OF LONG TERM ANTICOAGULATION: Status: ACTIVE | Noted: 2021-04-06

## 2021-04-06 PROBLEM — D68.9 COAGULATION DEFECT, UNSPECIFIED (HCC): Status: ACTIVE | Noted: 2020-10-01

## 2021-04-06 PROBLEM — Z11.1 ENCOUNTER FOR SCREENING FOR RESPIRATORY TUBERCULOSIS: Status: ACTIVE | Noted: 2020-10-01

## 2021-04-06 PROBLEM — Z79.899 HIGH RISK MEDICATION USE: Status: ACTIVE | Noted: 2021-04-06

## 2021-04-06 PROBLEM — Z51.5 PALLIATIVE CARE ENCOUNTER: Status: ACTIVE | Noted: 2021-04-06

## 2021-04-06 PROBLEM — Z66 DNR (DO NOT RESUSCITATE): Status: ACTIVE | Noted: 2020-04-29

## 2021-04-06 PROBLEM — I48.91 ATRIAL FIBRILLATION (HCC): Status: ACTIVE | Noted: 2021-04-06

## 2021-04-06 PROCEDURE — 99213 OFFICE O/P EST LOW 20 MIN: CPT | Performed by: ORTHOPAEDIC SURGERY

## 2021-04-06 PROCEDURE — 99213 OFFICE O/P EST LOW 20 MIN: CPT | Performed by: INTERNAL MEDICINE

## 2021-04-06 RX ORDER — PANTOPRAZOLE SODIUM 40 MG/1
1 TABLET, DELAYED RELEASE ORAL
COMMUNITY
Start: 2020-10-14 | End: 2022-04-07 | Stop reason: ALTCHOICE

## 2021-04-06 RX ORDER — HYDROCODONE BITARTRATE AND ACETAMINOPHEN 5; 325 MG/1; MG/1
1 TABLET ORAL 2 TIMES DAILY PRN
Qty: 60 TABLET | Refills: 0 | Status: SHIPPED | OUTPATIENT
Start: 2021-04-06 | End: 2021-10-07 | Stop reason: SDUPTHER

## 2021-04-06 ASSESSMENT — ENCOUNTER SYMPTOMS
BACK PAIN: 0
SHORTNESS OF BREATH: 0
CHEST TIGHTNESS: 0
ABDOMINAL PAIN: 0
NAUSEA: 0
EYE REDNESS: 0

## 2021-04-06 NOTE — PROGRESS NOTES
[facility] [address] [facility]  Office Visit  Follow up  Date:  2021    Name:  Amaris Wesley. Address:  [address]  [address]    :  1945      Age:   77 y.o.    SSN:  [id number]      Medical Record Number:  [id number]    Chief Complaint:    Follow up for left shoulder    HPI:   Amaris Wesley. is a 77 y.o. male who is following up on his left shoulder. To recap, He is being treated for left shoulder partial-thickness rotator cuff tear, osteoarthritis, right shoulder AC joint separation. He obtained a CT arthrogram of the left shoulder and is here to discuss results. He denies any new numbness, tingling, fevers, chills, chest pain, shortness of breath, or any other new significant symptoms. Pain Assessment  Location of Pain: Shoulder  Location Modifiers: Left, Right  Severity of Pain: 7  Quality of Pain: Dull, Sharp  Duration of Pain: Persistent  Frequency of Pain: Constant  Aggravating Factors:  Other (Comment)  Limiting Behavior: Yes  Relieving Factors: Rest, Heat  Result of Injury: No  Work-Related Injury: No  Are there other pain locations you wish to document?: No    Past History:  Past Medical History:   Diagnosis Date    Cardiomyopathy ([facility] [address]     Cerebrovascular disease     CHF (congestive heart failure) ([facility] [address]     Dialysis patient [facility])     [facility] [place]    H/O medication noncompliance     HTN (hypertension) 2011    Other and unspecified hyperlipidemia 2011    Pacemaker     Presence of implantable cardioverter-defibrillator (ICD)     Sleep disorder     Tachycardia 2011    TIA (transient ischemic attack) 2011       Past Surgical History:   Procedure Laterality Date    [facility]    DIALYSIS FISTULA CREATION Left 3/4/2021    LEFT BACHIAL CEPHALIC FISTULA CREATION performed by Araceli Olmos MD at [facility] [id number]    PACEMAKER PLACEMENT         Social History     Tobacco Use    Smoking status: Current Every Day Smoker     Packs/day: 0.25     Years: 30.00     Pack years: 7.50     Types: Cigarettes     Last attempt to quit: 10/13/2018     Years since quittin.4    Smokeless tobacco: Never Used    Tobacco comment: pt has cut down   Substance Use Topics    Alcohol use: No     Alcohol/week: 0.0 standard drinks    Drug use: No        Family History:  family history includes ADHD in his sister; Cancer in his father; Hypertension in his father; Osteoarthritis in his father; Stroke in his father. Current Outpatient Medications:     pantoprazole (PROTONIX) 40 MG tablet, Take 1 tablet by mouth, Disp: , Rfl:     HYDROcodone-acetaminophen (NORCO) 5-325 MG per tablet, Take 1 tablet by mouth 2 times daily as needed for Pain for up to 30 days. , Disp: 60 tablet, Rfl: 0    sertraline (ZOLOFT) 50 MG tablet, TAKE ONE TABLET BY MOUTH DAILY.  DISCONTINUE FLUOXETINE, Disp: 90 tablet, Rfl: 4    Ferric Citrate 1  MG(Fe) TABS, Take by mouth 2 times daily, Disp: , Rfl:     Multiple Vitamins-Minerals (RENAPLEX-D) TABS, Take by mouth daily, Disp: , Rfl:     amiodarone (CORDARONE) 200 MG tablet, Take 1 tablet by mouth daily (Patient taking differently: Take 200 mg by mouth nightly ), Disp: 30 tablet, Rfl: 5    torsemide (DEMADEX) 20 MG tablet, Take 1 tablet by mouth See Admin Instructions 2 pills daily at 6 am and one daily at noon (Patient taking differently: Take 20 mg by mouth See Admin Instructions 2 pills daily at 6 am and one daily at noon - not taking), Disp: 90 tablet, Rfl: 5    apixaban (ELIQUIS) 2.5 MG TABS tablet, Take by mouth 2 times daily, Disp: , Rfl:     hydrALAZINE (APRESOLINE) 10 MG tablet, Take 1 tablet by mouth 3 times daily, Disp: 90 tablet, Rfl: 5    allopurinol (ZYLOPRIM) 100 MG tablet, Take 1 tablet by mouth daily, Disp: 90 tablet, Rfl: 0    lovastatin (MEVACOR) 40 MG tablet, TAKE ONE TABLET BY MOUTH EVERY NIGHT AT BEDTIME (Patient taking differently: Not taking), Disp: 90 tablet, Rfl: 2      Allergies   Allergen Reactions    Corn-Containing Products      Other reaction(s): GI Upset  Only corn kernel         Review of Systems: A 14 point review of systems available in the scanned medical record as documented by the patient on 5/29/20. Today's review pertinent items are noted in HPI. Patient has had no medical changes since last evaluated        Physical Exam:  Ht 5' 6\" (1.676 m)   Wt 143 lb (64.9 kg)   BMI 23.08 kg/m²       General: No acute distress, well nourished  CV: No obvious peripheral edema. Normal peripheral pulses  Neuro: Alert & oriented x 3  Psych: Normal mood and affect    Left shoulder exam     Inspection:  Held in a normal posture. Normal contour at the acromioclavicular joint. No swelling, ecchymosis, or erythema about the shoulder. No atrophy appreciated. No scapular winging.      Palpation:  tenderness to palpation over the [facility] joint. Mild tenderness over the greater tuberosity     Range of Motion: Active range of motion to 110 degrees of forward flexion, 90 degrees of abduction, 35 degrees of external rotation and internal rotation to T12      Strength: 4+/5 supraspinatus, infraspinatus, and 5/5 subscapularis muscle strength.     Stability: No anterior instability. No posterior instability.     Special Tests: Negative champagne toast, negative [place]      Other findings: The skin is warm dry and well perfused. 2+ radial pulse. Sensation is intact to light touch over the deltoid.        Right comparison shoulder exam     Inspection: Obvious displacement of the clavicle at the [facility] joint. Held in a normal posture. Normal contour at the acromioclavicular joint. No swelling, ecchymosis, or erythema about the shoulder. No atrophy appreciated. No scapular winging.      Palpation:  No subacromial crepitus. No tenderness of the AC joint. No greater tuberosity tenderness.  No tenderness in the bicipital groove.     Range of Motion: 160 degrees of forward flexion and abduction, 35 degrees of external rotation and internal rotation to T12     Strength: 5/5 supraspinatus, infraspinatus, and subscapularis muscle strength.     Stability: No anterior instability. No posterior instability.     Special Tests: Negative [place] and champagne toast     Other findings: The skin is warm dry and well perfused. 2+ radial pulse. Sensation is intact to light touch over the deltoid. Radiographic:  CT arthrogram was reviewed today which demonstrates partial-thickness rotator cuff tear, severe glenohumeral joint osteoarthritis    Assessment:  Jackie Ramirez. is a 77 y.o. male with:  1. Left shoulder partial-thickness rotator cuff tear  2. Left shoulder osteoarthritis  3. Right AC separation  4. End-stage kidney disease    Impression:  Encounter Diagnoses   Name Primary?  Nontraumatic incomplete tear of left rotator cuff Yes    Acromioclavicular joint separation, type 3, right, initial encounter     Bilateral shoulder pain, unspecified chronicity     Rotator cuff dysfunction, left     Tendinitis of left rotator cuff        Office Procedures:  No orders of the defined types were placed in this encounter. Plan: We had a candid discussion with Jackie Ramirez. Denny Chan He has preserved range of motion but plain radiographs and especially the CT scan confirm rather advanced osteoarthritis. He is failing conservative treatment. If he would like to consider definitive treatment then this would be left reverse total shoulder arthroplasty. Patient however does have pretty good function and range of motion and we did discuss that he may not improve on that and may in fact have more limited range of motion however he would expect pain relief on the left side. He did have an injection during his last visit and states that it did not help and would not like to repeat any of those.   He would like to discuss this with his primary care physician and nephrologist to see if he would be cleared for a surgery such as this. He can follow-up after that. Kaia Noonan DO   Shoulder/Elbow Fellow    The encounter with Lenora Mendiola was supervised by Dr Queenie Carrillo who personally examined the patient and reviewed the plan. This dictation was performed with a verbal recognition program (DRAGON) and it was checked for errors. It is possible that there are still dictated errors within this office note. If so, please bring any errors to my attention for an addendum. All efforts were made to ensure that this office note is accurate.   _____________  I was physically present and personally supervised the Orthopaedic Sports Medicine Fellow in the evaluation and development of a treatment plan for this patient. I personally interviewed the patient and performed a physical examination. In addition, I discussed the patient's condition and treatment options with them. I have also reviewed and agree with the past medical, family and social history unless otherwise noted. All of the patient's questions were answered. Diane Carrillo MD, PhD  4/6/2021

## 2021-04-06 NOTE — PROGRESS NOTES
Subjective:      Patient ID: Britta Connor is a 77 y.o. male    Chief Complaint   Patient presents with    Hypertension     4 month follow up       Hypertension  This is a chronic problem. The current episode started more than 1 year ago. The problem is controlled. Pertinent negatives include no chest pain, headaches, neck pain, palpitations, peripheral edema or shortness of breath. Past treatments include diuretics. The current treatment provides significant improvement. Compliance problems include exercise. Shoulder Pain   The pain is present in the left shoulder and right shoulder. This is a chronic problem. The current episode started more than 1 year ago. The quality of the pain is described as aching. The pain is at a severity of 5/10. The pain is moderate. Pertinent negatives include no fever. The symptoms are aggravated by activity. He has tried rest and movement for the symptoms. The treatment provided mild relief. Family history does not include rheumatoid arthritis. ESRD         Dialysis MWF 11-4pm, he mostly sleeps, and then is up every night , [facility] on [address] [facility]. Current Outpatient Medications on File Prior to Visit   Medication Sig Dispense Refill    pantoprazole (PROTONIX) 40 MG tablet Take 1 tablet by mouth      sertraline (ZOLOFT) 50 MG tablet TAKE ONE TABLET BY MOUTH DAILY.  DISCONTINUE FLUOXETINE 90 tablet 4    Ferric Citrate 1  MG(Fe) TABS Take by mouth 2 times daily      Multiple Vitamins-Minerals (RENAPLEX-D) TABS Take by mouth daily      amiodarone (CORDARONE) 200 MG tablet Take 1 tablet by mouth daily (Patient taking differently: Take 200 mg by mouth nightly ) 30 tablet 5    torsemide (DEMADEX) 20 MG tablet Take 1 tablet by mouth See Admin Instructions 2 pills daily at 6 am and one daily at noon (Patient taking differently: Take 20 mg by mouth See Admin Instructions 2 pills daily at 6 am and one daily at noon - not taking) 90 tablet 5    apixaban (ELIQUIS) 2.5 MG TABS tablet Take by mouth 2 times daily      lovastatin (MEVACOR) 40 MG tablet TAKE ONE TABLET BY MOUTH EVERY NIGHT AT BEDTIME (Patient taking differently: Not taking) 90 tablet 2    hydrALAZINE (APRESOLINE) 10 MG tablet Take 1 tablet by mouth 3 times daily (Patient not taking: Reported on 4/6/2021) 90 tablet 5    allopurinol (ZYLOPRIM) 100 MG tablet Take 1 tablet by mouth daily (Patient not taking: Reported on 4/6/2021) 90 tablet 0     No current facility-administered medications on file prior to visit.         Allergies   Allergen Reactions    Corn-Containing Products      Other reaction(s): GI Upset  Only corn kernel       Past Medical History:   Diagnosis Date    Cardiomy[facility] [address]     Cerebrovascular disease     CHF (congestive heart failure) ([facility] [address]     Dialysis patient [facility])     [facility] [place]    H/O medication noncompliance     HTN (hypertension) 8/16/2011    Other and unspecified hyperlipidemia 8/16/2011    Pacemaker     Presence of implantable cardioverter-defibrillator (ICD)     Sleep disorder     Tachycardia 8/16/2011    TIA (transient ischemic attack) 8/16/2011     Past Surgical History:   Procedure Laterality Date    [facility]    DIALYSIS FISTULA CREATION Left 3/4/2021    LEFT BACHIAL CEPHALIC FISTULA CREATION performed by Treva Olmos MD at [place] [address] Bilateral [id number]    PACEMAKER PLACEMENT  2013     Social History     Socioeconomic History    Marital status:      Spouse name: Not on file    Number of children: 3    Years of education: Not on file    Highest education level: Not on file   Occupational History    Occupation:     Social Needs    Financial resource strain: Not on file    Food insecurity     Worry: Not on file     Inability: Not on file   [employer] needs     Medical: Not on file     Non-medical: Not on file   Tobacco Use    Smoking status: Current Every Day Smoker     Packs/day: 0.25     Years: 30.00     Pack years: 7.50     Types: Cigarettes     Last attempt to quit: 10/13/2018     Years since quittin.4    Smokeless tobacco: Never Used    Tobacco comment: pt has cut down   Substance and Sexual Activity    Alcohol use: No     Alcohol/week: 0.0 standard drinks    Drug use: No    Sexual activity: Not on file   Lifestyle    Physical activity     Days per week: Not on file     Minutes per session: Not on file    Stress: Not on file   Relationships    Social connections     Talks on phone: Not on file     Gets together: Not on file     Attends Protestant service: Not on file     Active member of club or organization: Not on file     Attends meetings of clubs or organizations: Not on file     Relationship status: Not on file    Intimate partner violence     Fear of current or ex partner: Not on file     Emotionally abused: Not on file     Physically abused: Not on file     Forced sexual activity: Not on file   Other Topics Concern    Not on file   Social History Narrative    He follows track and field sports. 3 children and 5 grandchildren.       Family History   Problem Relation Age of Onset    Osteoarthritis Father     Hypertension Father     Cancer Father         prostate, rectal    Stroke Father     ADHD Sister      Immunization History   Administered Date(s) Administered    COVID-19, Pfizer, PF, 30mcg/0.3mL 2021, 2021    Hepatitis B vaccine 2020, 2020, 2021    Influenza Virus Vaccine 10/15/2018    Influenza, High Dose (Fluzone 65 yrs and older) 2016, 2017, 10/13/2018    Influenza, Quadv, adjuvanted, 65 yrs +, IM, PF (Fluad) 2020    Influenza, Triv, inactivated, subunit, adjuvanted, IM (Fluad 65 yrs and older) 2019    Pneumococcal Conjugate 13-valent (Qtscvom55) 2016    Pneumococcal Polysaccharide (Mmeuwmmox11) 2018    Tdap (Boostrix, Adacel) 02/18/2016       Review of Systems   Constitutional: Negative for fatigue, fever and unexpected weight change. HENT: Negative for hearing loss. Eyes: Negative for redness and visual disturbance. Respiratory: Negative for chest tightness and shortness of breath. Cardiovascular: Negative for chest pain and palpitations. Gastrointestinal: Negative for abdominal pain and nausea. Endocrine: Negative for cold intolerance, heat intolerance, polydipsia and polyuria. Genitourinary: Negative for dysuria and hematuria. Musculoskeletal: Positive for arthralgias (bilateral shoulder pain, see Dr Catracho Yan ). Negative for back pain and neck pain. Skin: Negative for rash and wound. Neurological: Negative for dizziness and headaches. Hematological: Negative for adenopathy. Does not bruise/bleed easily. Psychiatric/Behavioral: Negative for agitation. The patient is not nervous/anxious. Objective:    Physical Exam  Constitutional:       Appearance: Normal appearance. Cardiovascular:      Rate and Rhythm: Normal rate and regular rhythm. Pulmonary:      Effort: Pulmonary effort is normal.      Breath sounds: No wheezing. Neurological:      General: No focal deficit present. Mental Status: He is alert and oriented to person, place, and time. Psychiatric:         Mood and Affect: Mood normal.       Vitals:    04/06/21 1109   BP: 112/78   Pulse: 77   Resp: 14   Temp: 98.6 °F (37 °C)   SpO2: 98%       Assessment and plan       1. Essential hypertension  Stable. Continue on dialysis. Continue medications as ordered. 2. Dislocation of shoulder region, unspecified laterality, subsequent encounter  Chronic shoulder pain. Follow-up with Dr. Catracho Yan.   - HYDROcodone-acetaminophen ([address]) 5-325 MG per tablet; Take 1 tablet by mouth 2 times daily as needed for Pain for up to 30 days. Dispense: 60 tablet;  Refill: 0    Controlled Substance Monitoring:    Acute and Chronic Pain Monitoring:   RX Monitoring 4/6/2021   Acute Pain Prescriptions -   Periodic Controlled Substance Monitoring No signs of potential drug abuse or diversion identified.

## 2021-04-13 ENCOUNTER — OFFICE VISIT (OUTPATIENT)
Dept: VASCULAR SURGERY | Age: 76
End: 2021-04-13

## 2021-04-13 VITALS — HEIGHT: 66 IN | WEIGHT: 141 LBS | BODY MASS INDEX: 22.66 KG/M2

## 2021-04-13 DIAGNOSIS — Z09 POSTOPERATIVE EXAMINATION: ICD-10-CM

## 2021-04-13 DIAGNOSIS — Z99.2 ESRD ON DIALYSIS (HCC): Primary | ICD-10-CM

## 2021-04-13 DIAGNOSIS — N18.6 ESRD ON DIALYSIS (HCC): Primary | ICD-10-CM

## 2021-04-13 PROCEDURE — 99024 POSTOP FOLLOW-UP VISIT: CPT | Performed by: SURGERY

## 2021-04-13 NOTE — PROGRESS NOTES
Postop Progress Note    Subjective    Jeromy Casey Jr. presents to the office for postop follow up. Underwent left brachiocephalic fistula creation, March 4. No complaints    Objective    Vitals:     General: alert, cooperative and no distress  Incision: healing well  Good thrill and bruit    Assessment  Doing well postoperatively. Plan  Left brachiocephalic fistula matured. Okay to begin accessing.     Electronically signed by Marylen Lawless, MD on 4/13/2021 at 11:17 AM

## 2021-04-13 NOTE — Clinical Note
I saw Lionel Brady in follow-up today.   His left brachiocephalic fistula that was placed last month is ready to access  Thanks again  rich

## 2021-05-05 ENCOUNTER — TELEPHONE (OUTPATIENT)
Dept: ORTHOPEDIC SURGERY | Age: 76
End: 2021-05-05

## 2021-05-06 PROBLEM — Z11.1 ENCOUNTER FOR SCREENING FOR RESPIRATORY TUBERCULOSIS: Status: RESOLVED | Noted: 2020-10-01 | Resolved: 2021-05-06

## 2021-05-26 ENCOUNTER — TELEPHONE (OUTPATIENT)
Dept: ORTHOPEDIC SURGERY | Age: 76
End: 2021-05-26

## 2021-06-04 NOTE — TELEPHONE ENCOUNTER
Auth: # [id number]    Date: 6/18/2021 (1day)  Type of SX:  Inpatient  Location: Lt shoulder  CPT: 65013   DX Code: U96.804  SX area: Lt shoulder  Insurance: [employer]

## 2021-06-07 ENCOUNTER — TELEPHONE (OUTPATIENT)
Dept: ORTHOPEDIC SURGERY | Age: 76
End: 2021-06-07

## 2021-06-07 NOTE — TELEPHONE ENCOUNTER
Surgery cancelled at this time. COVID: Has had 2nd vaccine  Pt is on dialysis  Has L fistula    RN called Dr. Patricia Rico office for cardiac clearance 6/11/21  Orthopedic Nurse Navigator Summary  -  Patient Name: Suzanne Garcia  Anticipated Date of Surgery:6/18/21  Using [employer]? Yes, Are they Registered: Yes  Attended Pre-Op Education Class: No  If No, why not? PCP:  Phone #:  Date of PCP Visit for H&P: 06/15/2021  Any Noted Concerns from PCP prior to surgery: No  If Yes, what concerns?:  Is the Patient in a Pain Management Program?: No  Review of Past Medical History Reveals History of:  -  Critical Lab Values:  - Hemoglobin (g/dL): Date Value  - Hematocrit (%): Date Value  - HgbA1C : Date Value  - Albumin : Date Value  - BUN (mg/dL) : Date Value  - CREATININE (mg/dL) : Date Value  - BMI (kg/m2) : Date Value  -  Coronary Artery Disease/HTN/CHF History: Yes  Cardiologist: Pacer, CHF, ICD  Cardiac Clearance Necessary: Yes  Date of Cardiac Clearance Appt: On Plavix? No  If YES, when will they stop taking? Final Cardiac Recommendations: cleared for surgery   On any anticoagulation: No  -  Diabetes History: No  Most Recent HgbA1C:  PCP or Endocrine Recommendations:  Nutritionist/Dietician Consult Scheduled:  Final Plan For Diabetic Control:  Pulmonary: COPD/Emphysema/ Use of home oxygen: NONE  Alcohol use: Non-Drinker  -  DVT Risk Stratification: Low Risk  Vascular Consult Ordered:  Date of Vascular Appt:  Hematology/Oncology Consult Ordered:  Date of Hematology/Oncology Appt:  Final Recommendation For DVT Prophylaxis:  Smoking history: Non-Smoker  Use of Estrogen:  -  BMI Greater than 40 at time of scheduling?: No  Has Surgeon been notified of BMI concern? No  Weight Loss Clinic Consult Ordered No  Date of Wt Loss Clinic Appt:  BMI at time of surgery (if went through [facility]):  -  Additional Medical Concerns:   Additional Recommendations for above concerns:  Attended Pre-Hab Program: N/A  Anticipated Discharge Disposition: Home  Who will be with patient at home following discharge?  wife  Equipment patient already has: walking stick  Bedroom on first or second floor: first  Bathroom on first or second floor: first  Weight bearing status: full  Pre-op ambulatory status:  Number of entry steps: 4 steps   Caregiver assistance: full time  [facility]  06/11/2021

## 2021-06-10 ENCOUNTER — TELEPHONE (OUTPATIENT)
Dept: ORTHOPEDIC SURGERY | Age: 76
End: 2021-06-10

## 2021-06-10 NOTE — TELEPHONE ENCOUNTER
Spoke with pt's wife, she was unaware that he had scheduled surgery and they just got his packet in the mail. They will try to get his preop physical/clearance this week.  He has preop visit on wed 6/16

## 2021-06-10 NOTE — TELEPHONE ENCOUNTER
General Question     Subject: [facility] [address] QUESTIONS  Patient and /or Facility Request: [id number] [facility] [facility] Number: [phone]

## 2021-06-15 NOTE — TELEPHONE ENCOUNTER
General Question     Subject: PRE OP TESTS AND THINGS NEEDED FROM  PCP PRE-SX  Patient and /or Facility Request: PATIENT'S  Contact Number: [phone]    PATIENT'S  WIFE IS ASKING THAT THE LIST BE EMAILED TO HER, AS THEY HAVE MISPLACED ORIG COPY - EMAIL: [email or web address]. [employer]

## 2021-06-15 NOTE — TELEPHONE ENCOUNTER
Appointment Request     Patient requesting earlier appointment: No  Appointment offered to patient: NO  Patient Contact Number: [phone]    PATIENT'S WIFE CALLED, STATES THAT HER  WANTS TO SCHEDULE  Alfred Mead. [facility] ON 06-18-21

## 2021-09-14 ENCOUNTER — OFFICE VISIT (OUTPATIENT)
Dept: VASCULAR SURGERY | Age: 76
End: 2021-09-14
Payer: MEDICARE

## 2021-09-14 ENCOUNTER — TELEPHONE (OUTPATIENT)
Dept: VASCULAR SURGERY | Age: 76
End: 2021-09-14

## 2021-09-14 VITALS
HEIGHT: 66 IN | SYSTOLIC BLOOD PRESSURE: 134 MMHG | BODY MASS INDEX: 22.66 KG/M2 | DIASTOLIC BLOOD PRESSURE: 84 MMHG | WEIGHT: 141 LBS

## 2021-09-14 DIAGNOSIS — Z99.2 ESRD ON DIALYSIS (HCC): Primary | ICD-10-CM

## 2021-09-14 DIAGNOSIS — N18.6 ESRD ON DIALYSIS (HCC): Primary | ICD-10-CM

## 2021-09-14 PROCEDURE — 99213 OFFICE O/P EST LOW 20 MIN: CPT | Performed by: SURGERY

## 2021-09-14 NOTE — PROGRESS NOTES
[facility])   Vascular Surgery Followup    Referring Provider:  Manny Rene MD     No chief complaint on file. History of Present Illness:  71-year-old male here today for follow-up with history of left brachiocephalic fistula creation March 4. Patient underwent angiogram of his fistula August 24, 2021. He has history of stent placement across a cephalic arch stenosis. He states he has had pain intermittently when this has been accessed. The reports of pain are very vague. He feels pain in both upper extremities. No history of access on the right side. He states he will feel the pain during dialysis or after dialysis. Past Medical History:   has a past medical history of Cardiomyopathy ([facility] [address] Cerebrovascular disease, CHF (congestive heart failure) ([facility] [address] Dialysis patient ([facility] [address] H/O medication noncompliance, HTN (hypertension), Other and unspecified hyperlipidemia, Pacemaker, Presence of implantable cardioverter-defibrillator (ICD), Sleep disorder, Tachycardia, and TIA (transient ischemic attack). Surgical History:   has a past surgical history that includes Cardiac defibrillator placement (2013); pacemaker placement (2013); hernia repair (Bilateral, [id number]); and Dialysis fistula creation (Left, 3/4/2021). Social History:   reports that he has been smoking cigarettes. He has a 7.50 pack-year smoking history. He has never used smokeless tobacco. He reports that he does not drink alcohol and does not use drugs. Family History:  family history includes ADHD in his sister; Cancer in his father; Hypertension in his father; Osteoarthritis in his father; Stroke in his father. Home Medications:  Current Outpatient Medications   Medication Sig Dispense Refill    pantoprazole (PROTONIX) 40 MG tablet Take 1 tablet by mouth      sertraline (ZOLOFT) 50 MG tablet TAKE ONE TABLET BY MOUTH DAILY.  DISCONTINUE FLUOXETINE 90 tablet 4    Ferric Citrate 1  MG(Fe) TABS Take by mouth 2 times daily      Multiple Vitamins-Minerals (RENAPLEX-D) TABS Take by mouth daily      amiodarone (CORDARONE) 200 MG tablet Take 1 tablet by mouth daily (Patient taking differently: Take 200 mg by mouth nightly ) 30 tablet 5    torsemide (DEMADEX) 20 MG tablet Take 1 tablet by mouth See Admin Instructions 2 pills daily at 6 am and one daily at noon (Patient taking differently: Take 20 mg by mouth See Admin Instructions 2 pills daily at 6 am and one daily at noon - not taking) 90 tablet 5    apixaban (ELIQUIS) 2.5 MG TABS tablet Take by mouth 2 times daily      hydrALAZINE (APRESOLINE) 10 MG tablet Take 1 tablet by mouth 3 times daily 90 tablet 5    allopurinol (ZYLOPRIM) 100 MG tablet Take 1 tablet by mouth daily 90 tablet 0    lovastatin (MEVACOR) 40 MG tablet TAKE ONE TABLET BY MOUTH EVERY NIGHT AT BEDTIME (Patient taking differently: Not taking) 90 tablet 2     No current facility-administered medications for this visit. Allergies:  Corn-containing products     Review of Systems:   · Constitutional: there has been no unanticipated weight loss. There's been no change in energy level, sleep pattern, or activity level. · Eyes: No visual changes or diplopia. No scleral icterus. · ENT: No Headaches, hearing loss or vertigo. No mouth sores or sore throat. · Cardiovascular: Reviewed in HPI  · Respiratory: No cough or wheezing, no sputum production. No hematemesis. · Gastrointestinal: No abdominal pain, appetite loss, blood in stools. No change in bowel or bladder habits. · Genitourinary: No dysuria, trouble voiding, or hematuria. · Musculoskeletal:  No gait disturbance, weakness or joint complaints. · Integumentary: No rash or pruritis. · Neurological: No headache, diplopia, change in muscle strength, numbness or tingling. No change in gait, balance, coordination, mood, affect, memory, mentation, behavior. · Psychiatric: No anxiety, no depression.   · Endocrine: No malaise, fatigue or temperature intolerance. No excessive thirst, fluid intake, or urination. No tremor. · Hematologic/Lymphatic: No abnormal bruising or bleeding, blood clots or swollen lymph nodes. · Allergic/Immunologic: No nasal congestion or hives. Physical Examination:    There were no vitals filed for this visit. General appearance: alert, appears stated age, cooperative and no distress  Left upper arm brachiocephalic fistula with excellent thrill and bruit. No evidence of venous outflow stenosis. No increased pulsatility. Hand warm. No significant aneurysm.       Assessment:     Patient Active Problem List   Diagnosis    Cardiomyopathy ([address]    Tachycardia    TIA (transient ischemic attack)    Essential hypertension    Chronic systolic congestive heart failure (HCC)    Iron deficiency anemia due to chronic blood loss    Sleep disorder    ESRD (end stage renal disease) ([address]    Mixed hyperlipidemia    Moderate episode of recurrent major depressive disorder (HCC)    Paroxysmal ventricular tachycardia (HCC)    Chronic kidney disease (CKD), stage IV (severe) (HCC)    ESRD on dialysis ([address]    AVINASH (acute kidney injury) ([address]    Allergy, unspecified, initial encounter    Anaphylactic shock, unspecified, initial encounter    Arthralgia of knee, left    Atrial fibrillation ([address]    Automatic implantable cardioverter-defibrillator in situ    Chronic ischemic heart disease    Coagulation defect, unspecified ([address]    Congestive heart failure ([address]    Current use of long term anticoagulation    Dependence on renal dialysis ([address]    DNR (do not resuscitate)    Encounter for fitting and adjustment of extracorporeal dialysis catheter ([address]    Goals of care, counseling/discussion    Palliative care encounter    Encounter for servicing of automatic implantable cardioverter-defibrillator (AICD) at end of battery life    GERD (gastroesophageal reflux disease)    High risk medication use    History of TIA (transient ischemic attack)    History of ventricular tachycardia       Plan:  There were no concerning findings on physical examination. I did discuss his care with Dr. Daniel Leija who agrees that there is no functional issues with the fistula. He has excellent flow. Given that he will sometimes not have pain depending on who accesses the fistula provides additional reassurance that there is not a problem with the fistula itself. At this point will defer on any further work-up or intervention from a vascular surgery perspective    Thank you for allowing me to participate in the care of this individual.  Please do not hesitate to contact me with any questions. Donny Bay M.D., FACS.   9/14/2021  2:11 PM

## 2021-10-07 ENCOUNTER — OFFICE VISIT (OUTPATIENT)
Dept: INTERNAL MEDICINE CLINIC | Age: 76
End: 2021-10-07
Payer: MEDICARE

## 2021-10-07 VITALS
HEIGHT: 66 IN | SYSTOLIC BLOOD PRESSURE: 124 MMHG | HEART RATE: 81 BPM | TEMPERATURE: 97.5 F | DIASTOLIC BLOOD PRESSURE: 74 MMHG | WEIGHT: 145.6 LBS | OXYGEN SATURATION: 99 % | BODY MASS INDEX: 23.4 KG/M2

## 2021-10-07 DIAGNOSIS — I47.29 PAROXYSMAL VENTRICULAR TACHYCARDIA: ICD-10-CM

## 2021-10-07 DIAGNOSIS — I50.22 CHRONIC SYSTOLIC CONGESTIVE HEART FAILURE (HCC): ICD-10-CM

## 2021-10-07 DIAGNOSIS — N18.6 ESRD (END STAGE RENAL DISEASE) (HCC): ICD-10-CM

## 2021-10-07 DIAGNOSIS — I10 ESSENTIAL HYPERTENSION: Primary | ICD-10-CM

## 2021-10-07 DIAGNOSIS — F33.1 MODERATE EPISODE OF RECURRENT MAJOR DEPRESSIVE DISORDER (HCC): ICD-10-CM

## 2021-10-07 DIAGNOSIS — Z23 NEED FOR INFLUENZA VACCINATION: ICD-10-CM

## 2021-10-07 DIAGNOSIS — S43.006D DISLOCATION OF SHOULDER REGION, UNSPECIFIED LATERALITY, SUBSEQUENT ENCOUNTER: ICD-10-CM

## 2021-10-07 PROCEDURE — 99214 OFFICE O/P EST MOD 30 MIN: CPT | Performed by: INTERNAL MEDICINE

## 2021-10-07 PROCEDURE — 90694 VACC AIIV4 NO PRSRV 0.5ML IM: CPT | Performed by: INTERNAL MEDICINE

## 2021-10-07 PROCEDURE — G0008 ADMIN INFLUENZA VIRUS VAC: HCPCS | Performed by: INTERNAL MEDICINE

## 2021-10-07 RX ORDER — HYDROCODONE BITARTRATE AND ACETAMINOPHEN 5; 325 MG/1; MG/1
1 TABLET ORAL 2 TIMES DAILY PRN
Qty: 60 TABLET | Refills: 0 | Status: ON HOLD
Start: 2021-10-07 | End: 2021-11-07 | Stop reason: HOSPADM

## 2021-10-07 ASSESSMENT — PATIENT HEALTH QUESTIONNAIRE - PHQ9
1. LITTLE INTEREST OR PLEASURE IN DOING THINGS: 0
2. FEELING DOWN, DEPRESSED OR HOPELESS: 0
SUM OF ALL RESPONSES TO PHQ QUESTIONS 1-9: 0
SUM OF ALL RESPONSES TO PHQ9 QUESTIONS 1 & 2: 0
SUM OF ALL RESPONSES TO PHQ QUESTIONS 1-9: 0
SUM OF ALL RESPONSES TO PHQ QUESTIONS 1-9: 0

## 2021-10-07 ASSESSMENT — ENCOUNTER SYMPTOMS
EYE REDNESS: 0
NAUSEA: 0
BACK PAIN: 0
CHEST TIGHTNESS: 0
SHORTNESS OF BREATH: 0
ABDOMINAL PAIN: 0

## 2021-10-07 NOTE — PROGRESS NOTES
Subjective:      Patient ID: Juanito Varela is a 77 y.o. male    Chief Complaint   Patient presents with    Hypertension    Hyperlipidemia       Hypertension  This is a chronic problem. The current episode started more than 1 year ago. The problem is controlled. Pertinent negatives include no chest pain, headaches, neck pain, palpitations, peripheral edema or shortness of breath. Past treatments include diuretics. The current treatment provides significant improvement. Compliance problems include exercise. Hyperlipidemia  This is a chronic problem. The current episode started more than 1 year ago. The problem is controlled. He has no history of obesity. Pertinent negatives include no chest pain or shortness of breath. Current antihyperlipidemic treatment includes diet change. The current treatment provides significant improvement of lipids. Compliance problems include adherence to exercise. Congestive Heart Failure  Presents for follow-up visit. Associated symptoms include fatigue. Pertinent negatives include no abdominal pain, chest pain, edema, palpitations, shortness of breath or unexpected weight change. Symptom course: improved  Compliance with total regimen is 51-75%. Compliance problems include adherence to exercise. Compliance with diet is 51-75%. Compliance with exercise is 0-25%. Compliance with medications is 51-75%. Current Outpatient Medications on File Prior to Visit   Medication Sig Dispense Refill    pantoprazole (PROTONIX) 40 MG tablet Take 1 tablet by mouth      sertraline (ZOLOFT) 50 MG tablet TAKE ONE TABLET BY MOUTH DAILY.  DISCONTINUE FLUOXETINE 90 tablet 4    Ferric Citrate 1  MG(Fe) TABS Take by mouth 2 times daily      Multiple Vitamins-Minerals (RENAPLEX-D) TABS Take by mouth daily      amiodarone (CORDARONE) 200 MG tablet Take 1 tablet by mouth daily (Patient taking differently: Take 200 mg by mouth nightly ) 30 tablet 5    apixaban (ELIQUIS) 2.5 MG TABS tablet Take by mouth 2 times daily      allopurinol (ZYLOPRIM) 100 MG tablet Take 1 tablet by mouth daily 90 tablet 0    torsemide (DEMADEX) 20 MG tablet Take 1 tablet by mouth See Admin Instructions 2 pills daily at 6 am and one daily at noon (Patient taking differently: Take 20 mg by mouth See Admin Instructions 2 pills daily at 6 am and one daily at noon - not taking) 90 tablet 5     No current facility-administered medications on file prior to visit. Allergies   Allergen Reactions    Corn-Containing Products      Other reaction(s): GI Upset  Only corn kernel       Review of Systems   Constitutional: Positive for fatigue. Negative for fever and unexpected weight change. HENT: Negative for hearing loss. Eyes: Negative for redness and visual disturbance. Respiratory: Negative for chest tightness and shortness of breath. Cardiovascular: Negative for chest pain and palpitations. Gastrointestinal: Negative for abdominal pain and nausea. Genitourinary: Negative for dysuria and hematuria. Musculoskeletal: Negative for arthralgias, back pain and neck pain. Skin: Negative for rash and wound. Neurological: Negative for dizziness and headaches. Hematological: Negative for adenopathy. Does not bruise/bleed easily. Psychiatric/Behavioral: Negative for agitation. The patient is not nervous/anxious. Objective:   Physical Exam  Constitutional:       Appearance: Normal appearance. He is well-developed. Cardiovascular:      Rate and Rhythm: Normal rate and regular rhythm. Heart sounds: Normal heart sounds. No murmur heard. Pulmonary:      Effort: Pulmonary effort is normal.      Breath sounds: Normal breath sounds. No rales. Skin:     Findings: No rash. Neurological:      Mental Status: He is alert and oriented to person, place, and time. Psychiatric:         Mood and Affect: Mood normal.         Assessment and plan       1. Essential hypertension  Improved with dialysis. Continue current schedule. 2. Need for influenza vaccination  Flu shot today. - INFLUENZA, QUADV, ADJUVANTED, 65 YRS =, IM, PF, PREFILL SYR, 0.5ML (FLUAD)    3. Chronic systolic congestive heart failure (HCC)  Stable. Continue dialysis. 4. ESRD (end stage renal disease) ([facility] [address]  Stable. Continue dialysis. 5. Moderate episode of recurrent major depressive disorder (HCC)  Stable. Continue on antidepressant. 6. Paroxysmal ventricular tachycardia (HCC)  Stable. See orders.

## 2021-10-21 ENCOUNTER — OFFICE VISIT (OUTPATIENT)
Dept: INTERNAL MEDICINE CLINIC | Age: 76
End: 2021-10-21
Payer: MEDICARE

## 2021-10-21 VITALS
OXYGEN SATURATION: 93 % | BODY MASS INDEX: 23.24 KG/M2 | DIASTOLIC BLOOD PRESSURE: 70 MMHG | WEIGHT: 144.6 LBS | SYSTOLIC BLOOD PRESSURE: 138 MMHG | HEART RATE: 90 BPM | TEMPERATURE: 97.5 F | HEIGHT: 66 IN

## 2021-10-21 DIAGNOSIS — L03.012 CELLULITIS OF LEFT RING FINGER: Primary | ICD-10-CM

## 2021-10-21 PROCEDURE — 99213 OFFICE O/P EST LOW 20 MIN: CPT | Performed by: INTERNAL MEDICINE

## 2021-10-21 RX ORDER — AMOXICILLIN AND CLAVULANATE POTASSIUM 500; 125 MG/1; MG/1
1 TABLET, FILM COATED ORAL DAILY
Qty: 10 TABLET | Refills: 0 | Status: SHIPPED | OUTPATIENT
Start: 2021-10-21 | End: 2021-10-31

## 2021-10-21 ASSESSMENT — ENCOUNTER SYMPTOMS: SHORTNESS OF BREATH: 0

## 2021-10-21 ASSESSMENT — PATIENT HEALTH QUESTIONNAIRE - PHQ9
SUM OF ALL RESPONSES TO PHQ QUESTIONS 1-9: 2
SUM OF ALL RESPONSES TO PHQ QUESTIONS 1-9: 2
SUM OF ALL RESPONSES TO PHQ9 QUESTIONS 1 & 2: 2
1. LITTLE INTEREST OR PLEASURE IN DOING THINGS: 1
SUM OF ALL RESPONSES TO PHQ QUESTIONS 1-9: 2
2. FEELING DOWN, DEPRESSED OR HOPELESS: 1

## 2021-10-21 NOTE — PROGRESS NOTES
polydipsia and polyuria. Musculoskeletal:        Swollen tender left ring finger        Objective:   Physical Exam  Constitutional:       Appearance: Normal appearance. Cardiovascular:      Rate and Rhythm: Normal rate and regular rhythm. Pulmonary:      Effort: Pulmonary effort is normal.      Breath sounds: Normal breath sounds. No wheezing or rales. Musculoskeletal:      Comments: Swollen and tender left ring finger    Neurological:      Mental Status: He is alert and oriented to person, place, and time. Assessment and plan       1. Cellulitis of left ring finger  Markedly swollen left ring fingertip. Start on antibiotic and urgent referral to hand surgery for evaluation and possible drainage.  - Leila Siegel MD, Hand Surgery (Hand, Wrist, Upper Extremity), [facility]  - amoxicillin-clavulanate (AUGMENTIN) 500-125 MG per tablet; Take 1 tablet by mouth daily for 10 days  Dispense: 10 tablet;  Refill: 0

## 2021-10-21 NOTE — Clinical Note
Audra Rajput,   Could you please get Ck Hall. in to the office quickly and have a look at his finger. It is extremely sore and swollen and may need surgical drainage. I am starting him on Augmentin. He does have end-stage renal disease and is on dialysis.   Thanks,   Valentin Gardiner

## 2021-10-22 ENCOUNTER — TELEPHONE (OUTPATIENT)
Dept: ORTHOPEDIC SURGERY | Age: 76
End: 2021-10-22

## 2021-10-22 NOTE — TELEPHONE ENCOUNTER
Appointment Request     Patient requesting earlier appointment: Yes  Appointment offered to patient:   Patient Contact Number: [phone]

## 2021-10-27 ENCOUNTER — TELEPHONE (OUTPATIENT)
Dept: ORTHOPEDIC SURGERY | Age: 76
End: 2021-10-27

## 2021-10-27 NOTE — TELEPHONE ENCOUNTER
Spoke with Mrs. Miles Chatman she states that pt has an appointment already with Amanda Nettles.  November 8th

## 2021-10-27 NOTE — TELEPHONE ENCOUNTER
Appointment Request     Patient requesting earlier appointment: Yes  Appointment offered to patient:   Patient Contact Number: [phone]

## 2021-11-02 ENCOUNTER — NURSE TRIAGE (OUTPATIENT)
Dept: OTHER | Facility: CLINIC | Age: 76
End: 2021-11-02

## 2021-11-02 ENCOUNTER — HOSPITAL ENCOUNTER (INPATIENT)
Age: 76
LOS: 5 days | Discharge: HOME HEALTH | DRG: 513 | End: 2021-11-07
Attending: EMERGENCY MEDICINE | Admitting: HOSPITALIST
Payer: MEDICARE

## 2021-11-02 ENCOUNTER — APPOINTMENT (OUTPATIENT)
Dept: GENERAL RADIOLOGY | Age: 76
DRG: 513 | End: 2021-11-02
Payer: MEDICARE

## 2021-11-02 DIAGNOSIS — Z99.2 ESRD ON DIALYSIS (HCC): ICD-10-CM

## 2021-11-02 DIAGNOSIS — M86.00 ACUTE HEMATOGENOUS OSTEOMYELITIS, UNSPECIFIED SITE (HCC): ICD-10-CM

## 2021-11-02 DIAGNOSIS — S43.006D DISLOCATION OF SHOULDER REGION, UNSPECIFIED LATERALITY, SUBSEQUENT ENCOUNTER: ICD-10-CM

## 2021-11-02 DIAGNOSIS — N18.6 ESRD ON DIALYSIS (HCC): ICD-10-CM

## 2021-11-02 DIAGNOSIS — L03.022: Primary | ICD-10-CM

## 2021-11-02 PROBLEM — M86.9 OSTEOMYELITIS (HCC): Status: ACTIVE | Noted: 2021-11-02

## 2021-11-02 LAB
ANION GAP SERPL CALCULATED.3IONS-SCNC: 11 MMOL/L (ref 3–16)
BANDED NEUTROPHILS RELATIVE PERCENT: >120 % (ref 0–7)
BASOPHILS ABSOLUTE: 0 K/UL (ref 0–0.2)
BASOPHILS RELATIVE PERCENT: 1.3 %
BUN BLDV-MCNC: 18 MG/DL (ref 7–20)
C-REACTIVE PROTEIN: 47.3 MG/L (ref 0–5.1)
CALCIUM SERPL-MCNC: 9.3 MG/DL (ref 8.3–10.6)
CHLORIDE BLD-SCNC: 95 MMOL/L (ref 99–110)
CO2: 31 MMOL/L (ref 21–32)
CREAT SERPL-MCNC: 5.7 MG/DL (ref 0.8–1.3)
EOSINOPHILS ABSOLUTE: 0 K/UL (ref 0–0.6)
EOSINOPHILS RELATIVE PERCENT: 1.8 %
GFR AFRICAN AMERICAN: 12
GFR NON-AFRICAN AMERICAN: 10
GLUCOSE BLD-MCNC: 89 MG/DL (ref 70–99)
HCT VFR BLD CALC: 40.7 % (ref 40.5–52.5)
HEMOGLOBIN: 13.1 G/DL (ref 13.5–17.5)
LYMPHOCYTES ABSOLUTE: 0 K/UL (ref 1–5.1)
LYMPHOCYTES RELATIVE PERCENT: 16.6 %
MCH RBC QN AUTO: 34.3 PG (ref 26–34)
MCHC RBC AUTO-ENTMCNC: 32.2 G/DL (ref 31–36)
MCV RBC AUTO: 106.6 FL (ref 80–100)
MONOCYTES ABSOLUTE: 0 K/UL (ref 0–1.3)
MONOCYTES RELATIVE PERCENT: 16.5 %
NEUTROPHILS ABSOLUTE: 5.4 K/UL (ref 1.7–7.7)
NEUTROPHILS RELATIVE PERCENT: 63.8 %
PDW BLD-RTO: 17.5 % (ref 12.4–15.4)
PLATELET # BLD: 206 K/UL (ref 135–450)
PMV BLD AUTO: 8.9 FL (ref 5–10.5)
POTASSIUM REFLEX MAGNESIUM: 4.5 MMOL/L (ref 3.5–5.1)
RBC # BLD: 3.82 M/UL (ref 4.2–5.9)
SEDIMENTATION RATE, ERYTHROCYTE: >120 MM/HR (ref 0–20)
SODIUM BLD-SCNC: 137 MMOL/L (ref 136–145)
WBC # BLD: 4.5 K/UL (ref 4–11)

## 2021-11-02 PROCEDURE — 86140 C-REACTIVE PROTEIN: CPT

## 2021-11-02 PROCEDURE — 87040 BLOOD CULTURE FOR BACTERIA: CPT

## 2021-11-02 PROCEDURE — 73130 X-RAY EXAM OF HAND: CPT

## 2021-11-02 PROCEDURE — 85025 COMPLETE CBC W/AUTO DIFF WBC: CPT

## 2021-11-02 PROCEDURE — 6370000000 HC RX 637 (ALT 250 FOR IP): Performed by: STUDENT IN AN ORGANIZED HEALTH CARE EDUCATION/TRAINING PROGRAM

## 2021-11-02 PROCEDURE — 1200000000 HC SEMI PRIVATE

## 2021-11-02 PROCEDURE — 6360000002 HC RX W HCPCS: Performed by: PHYSICIAN ASSISTANT

## 2021-11-02 PROCEDURE — 99222 1ST HOSP IP/OBS MODERATE 55: CPT | Performed by: HOSPITALIST

## 2021-11-02 PROCEDURE — 85652 RBC SED RATE AUTOMATED: CPT

## 2021-11-02 PROCEDURE — 99282 EMERGENCY DEPT VISIT SF MDM: CPT

## 2021-11-02 PROCEDURE — 36415 COLL VENOUS BLD VENIPUNCTURE: CPT

## 2021-11-02 PROCEDURE — 6360000002 HC RX W HCPCS: Performed by: STUDENT IN AN ORGANIZED HEALTH CARE EDUCATION/TRAINING PROGRAM

## 2021-11-02 PROCEDURE — 2580000003 HC RX 258: Performed by: PHYSICIAN ASSISTANT

## 2021-11-02 PROCEDURE — 2580000003 HC RX 258: Performed by: STUDENT IN AN ORGANIZED HEALTH CARE EDUCATION/TRAINING PROGRAM

## 2021-11-02 PROCEDURE — 80048 BASIC METABOLIC PNL TOTAL CA: CPT

## 2021-11-02 PROCEDURE — 96374 THER/PROPH/DIAG INJ IV PUSH: CPT

## 2021-11-02 RX ORDER — OXYCODONE HYDROCHLORIDE AND ACETAMINOPHEN 5; 325 MG/1; MG/1
1 TABLET ORAL ONCE
Status: COMPLETED | OUTPATIENT
Start: 2021-11-02 | End: 2021-11-02

## 2021-11-02 RX ORDER — HEPARIN SODIUM 5000 [USP'U]/ML
5000 INJECTION, SOLUTION INTRAVENOUS; SUBCUTANEOUS EVERY 8 HOURS SCHEDULED
Status: DISCONTINUED | OUTPATIENT
Start: 2021-11-02 | End: 2021-11-03

## 2021-11-02 RX ORDER — ONDANSETRON 2 MG/ML
4 INJECTION INTRAMUSCULAR; INTRAVENOUS EVERY 6 HOURS PRN
Status: DISCONTINUED | OUTPATIENT
Start: 2021-11-02 | End: 2021-11-07 | Stop reason: HOSPADM

## 2021-11-02 RX ORDER — MORPHINE SULFATE 2 MG/ML
2 INJECTION, SOLUTION INTRAMUSCULAR; INTRAVENOUS ONCE
Status: COMPLETED | OUTPATIENT
Start: 2021-11-02 | End: 2021-11-02

## 2021-11-02 RX ORDER — SODIUM CHLORIDE 9 MG/ML
25 INJECTION, SOLUTION INTRAVENOUS PRN
Status: DISCONTINUED | OUTPATIENT
Start: 2021-11-02 | End: 2021-11-07 | Stop reason: HOSPADM

## 2021-11-02 RX ORDER — SODIUM CHLORIDE 0.9 % (FLUSH) 0.9 %
5-40 SYRINGE (ML) INJECTION PRN
Status: DISCONTINUED | OUTPATIENT
Start: 2021-11-02 | End: 2021-11-07 | Stop reason: HOSPADM

## 2021-11-02 RX ORDER — ACETAMINOPHEN 325 MG/1
650 TABLET ORAL EVERY 6 HOURS PRN
Status: DISCONTINUED | OUTPATIENT
Start: 2021-11-02 | End: 2021-11-07 | Stop reason: HOSPADM

## 2021-11-02 RX ORDER — SODIUM CHLORIDE 0.9 % (FLUSH) 0.9 %
5-40 SYRINGE (ML) INJECTION EVERY 12 HOURS SCHEDULED
Status: DISCONTINUED | OUTPATIENT
Start: 2021-11-02 | End: 2021-11-07 | Stop reason: HOSPADM

## 2021-11-02 RX ORDER — ONDANSETRON 4 MG/1
4 TABLET, ORALLY DISINTEGRATING ORAL EVERY 8 HOURS PRN
Status: DISCONTINUED | OUTPATIENT
Start: 2021-11-02 | End: 2021-11-07 | Stop reason: HOSPADM

## 2021-11-02 RX ORDER — ACETAMINOPHEN 650 MG/1
650 SUPPOSITORY RECTAL EVERY 6 HOURS PRN
Status: DISCONTINUED | OUTPATIENT
Start: 2021-11-02 | End: 2021-11-07 | Stop reason: HOSPADM

## 2021-11-02 RX ORDER — POLYETHYLENE GLYCOL 3350 17 G/17G
17 POWDER, FOR SOLUTION ORAL DAILY PRN
Status: DISCONTINUED | OUTPATIENT
Start: 2021-11-02 | End: 2021-11-07 | Stop reason: HOSPADM

## 2021-11-02 RX ADMIN — SODIUM CHLORIDE, PRESERVATIVE FREE 10 ML: 5 INJECTION INTRAVENOUS at 22:52

## 2021-11-02 RX ADMIN — VANCOMYCIN HYDROCHLORIDE 1000 MG: 10 INJECTION, POWDER, LYOPHILIZED, FOR SOLUTION INTRAVENOUS at 21:05

## 2021-11-02 RX ADMIN — MORPHINE SULFATE 2 MG: 2 INJECTION, SOLUTION INTRAMUSCULAR; INTRAVENOUS at 19:23

## 2021-11-02 RX ADMIN — OXYCODONE HYDROCHLORIDE AND ACETAMINOPHEN 1 TABLET: 5; 325 TABLET ORAL at 22:48

## 2021-11-02 RX ADMIN — HEPARIN SODIUM 5000 UNITS: 5000 INJECTION INTRAVENOUS; SUBCUTANEOUS at 22:49

## 2021-11-02 ASSESSMENT — PAIN SCALES - GENERAL
PAINLEVEL_OUTOF10: 8
PAINLEVEL_OUTOF10: 9
PAINLEVEL_OUTOF10: 8

## 2021-11-02 ASSESSMENT — PAIN DESCRIPTION - LOCATION: LOCATION: FINGER (COMMENT WHICH ONE)

## 2021-11-02 ASSESSMENT — PAIN DESCRIPTION - DESCRIPTORS: DESCRIPTORS: ACHING

## 2021-11-02 ASSESSMENT — ENCOUNTER SYMPTOMS
CHEST TIGHTNESS: 0
WHEEZING: 0
SHORTNESS OF BREATH: 0
ABDOMINAL PAIN: 0
NAUSEA: 0
DIARRHEA: 0
COUGH: 0
VOMITING: 0

## 2021-11-02 ASSESSMENT — PAIN DESCRIPTION - PROGRESSION: CLINICAL_PROGRESSION: NOT CHANGED

## 2021-11-02 ASSESSMENT — PAIN DESCRIPTION - PAIN TYPE: TYPE: ACUTE PAIN

## 2021-11-02 ASSESSMENT — PAIN DESCRIPTION - FREQUENCY: FREQUENCY: CONTINUOUS

## 2021-11-02 ASSESSMENT — PAIN DESCRIPTION - ONSET: ONSET: ON-GOING

## 2021-11-02 NOTE — ED PROVIDER NOTES
Cerebrovascular disease, CHF (congestive heart failure) ([facility] [address] Dialysis patient ([facility] [address] H/O medication noncompliance, HTN (hypertension), Other and unspecified hyperlipidemia, Pacemaker, Presence of implantable cardioverter-defibrillator (ICD), Sleep disorder, Tachycardia, and TIA (transient ischemic attack). He has a past surgical history that includes Cardiac defibrillator placement (2013); pacemaker placement (2013); hernia repair (Bilateral, [id number]); and Dialysis fistula creation (Left, 3/4/2021). His family history includes ADHD in his sister; Cancer in his father; Hypertension in his father; Osteoarthritis in his father; Stroke in his father. He reports that he has been smoking cigarettes. He has a 7.50 pack-year smoking history. He has never used smokeless tobacco. He reports that he does not drink alcohol and does not use drugs. Medications     Current Discharge Medication List      CONTINUE these medications which have NOT CHANGED    Details   HYDROcodone-acetaminophen (NORCO) 5-325 MG per tablet Take 1 tablet by mouth 2 times daily as needed for Pain for up to 30 days. Qty: 60 tablet, Refills: 0    Comments: Reduce doses taken as pain becomes manageable  Associated Diagnoses: Dislocation of shoulder region, unspecified laterality, subsequent encounter      pantoprazole (PROTONIX) 40 MG tablet Take 1 tablet by mouth      sertraline (ZOLOFT) 50 MG tablet TAKE ONE TABLET BY MOUTH DAILY. DISCONTINUE FLUOXETINE  Qty: 90 tablet, Refills: 4    Associated Diagnoses:  Moderate episode of recurrent major depressive disorder (HCC)      Ferric Citrate 1  MG(Fe) TABS Take by mouth 2 times daily      Multiple Vitamins-Minerals (RENAPLEX-D) TABS Take by mouth daily      amiodarone (CORDARONE) 200 MG tablet Take 1 tablet by mouth daily  Qty: 30 tablet, Refills: 5      torsemide (DEMADEX) 20 MG tablet Take 1 tablet by mouth See Admin Instructions 2 pills daily at 6 am and one daily at noon  Qty: 90 tablet, Refills: 5    Associated Diagnoses: CRF (chronic renal failure), stage 3 (moderate) ([facility] [address] Chronic systolic congestive heart failure (HCC)      apixaban (ELIQUIS) 2.5 MG TABS tablet Take by mouth 2 times daily      allopurinol (ZYLOPRIM) 100 MG tablet Take 1 tablet by mouth daily  Qty: 90 tablet, Refills: 0             Allergies     He is allergic to corn-containing products. Physical Exam     INITIAL VITALS: BP: (!) 121/90, Temp: 97.8 °F (36.6 °C), Pulse: 80, Resp: 16, SpO2: 95 %  Physical Exam  Vitals and nursing note reviewed. Constitutional:       General: He is not in acute distress. Appearance: He is well-developed. He is not diaphoretic. HENT:      Head: Normocephalic and atraumatic. Eyes:      Conjunctiva/sclera: Conjunctivae normal.      Pupils: Pupils are equal, round, and reactive to light. Musculoskeletal:      Cervical back: Normal range of motion. Comments: Left 4th finger with significant circumferential swelling. Significant tenderness and fluctuance over the pulp of the finger pad, consistent with felon. Able to fully extend the finger without difficulty, although unable to flex at the DIP joint secondary to pain. No pain along tendon sheath at this time   Skin:     General: Skin is warm and dry. Neurological:      Mental Status: He is alert and oriented to person, place, and time. Psychiatric:         Behavior: Behavior normal.         Thought Content: Thought content normal.         Judgment: Judgment normal.                   Diagnostic Results     EKG   none    RADIOLOGY:  XR HAND LEFT (MIN 3 VIEWS)   Final Result      1. Abnormal lytic change affecting the distal phalanx of the fourth finger with overlying soft tissue swelling and ostial lysis consistent most likely with osteomyelitis of the finger with advanced lytic distal phalanx bone destruction      2.  Advanced arthritic change affecting the base of the thumb at the first carpal-metacarpal articulation and advanced joint space loss at the first and second metacarpal phalangeal joint with subchondral erosions of the first and second metacarpal head                LABS:   Results for orders placed or performed during the hospital encounter of 11/02/21   CBC Auto Differential   Result Value Ref Range    WBC 4.5 4.0 - 11.0 K/uL    RBC 3.82 (L) 4.20 - 5.90 M/uL    Hemoglobin 13.1 (L) 13.5 - 17.5 g/dL    Hematocrit 40.7 40.5 - 52.5 %    .6 (H) 80.0 - 100.0 fL    MCH 34.3 (H) 26.0 - 34.0 pg    MCHC 32.2 31.0 - 36.0 g/dL    RDW 17.5 (H) 12.4 - 15.4 %    Platelets 163 694 - 080 K/uL    MPV 8.9 5.0 - 10.5 fL    Neutrophils % 63.8 %    Lymphocytes % 16.6 %    Monocytes % 16.5 %    Eosinophils % 1.8 %    Basophils % 1.3 %    Neutrophils Absolute 5.4 1.7 - 7.7 K/uL    Lymphocytes Absolute 0.0 (L) 1.0 - 5.1 K/uL    Monocytes Absolute 0.0 0.0 - 1.3 K/uL    Eosinophils Absolute 0.0 0.0 - 0.6 K/uL    Basophils Absolute 0.0 0.0 - 0.2 K/uL    Bands Relative >120 (H) 0 - 7 %   Basic Metabolic Panel w/ Reflex to MG   Result Value Ref Range    Sodium 137 136 - 145 mmol/L    Potassium reflex Magnesium 4.5 3.5 - 5.1 mmol/L    Chloride 95 (L) 99 - 110 mmol/L    CO2 31 21 - 32 mmol/L    Anion Gap 11 3 - 16    Glucose 89 70 - 99 mg/dL    BUN 18 7 - 20 mg/dL    CREATININE 5.7 (HH) 0.8 - 1.3 mg/dL    GFR Non-African American 10 (A) >60    GFR  12 (A) >60    Calcium 9.3 8.3 - 10.6 mg/dL   Sedimentation Rate   Result Value Ref Range    Sed Rate >120 (H) 0 - 20 mm/Hr   CRP   Result Value Ref Range    CRP 47.3 (H) 0.0 - 5.1 mg/L       ED BEDSIDE ULTRASOUND:  none    RECENT VITALS:  BP: 119/84, Temp: 98.2 °F (36.8 °C), Pulse: 85, Resp: 16, SpO2: 92 %     Procedures     none    ED Course     Nursing Notes, Past Medical Hx,Past Surgical Hx, Social Hx, Allergies, and Family Hx were reviewed.     The patient was given the following medications:  Orders Placed This Encounter   Medications    morphine (PF) injection 2 mg    vancomycin (VANCOCIN) 1,000 mg in dextrose 5 % 250 mL IVPB     Order Specific Question:   Antimicrobial Indications     Answer:   Skin and Soft Tissue Infection    sodium chloride flush 0.9 % injection 5-40 mL    sodium chloride flush 0.9 % injection 5-40 mL    0.9 % sodium chloride infusion    heparin (porcine) injection 5,000 Units    OR Linked Order Group     ondansetron (ZOFRAN-ODT) disintegrating tablet 4 mg     ondansetron (ZOFRAN) injection 4 mg    polyethylene glycol (GLYCOLAX) packet 17 g    OR Linked Order Group     acetaminophen (TYLENOL) tablet 650 mg     acetaminophen (TYLENOL) suppository 650 mg    oxyCODONE-acetaminophen (PERCOCET) 5-325 MG per tablet 1 tablet       CONSULTS:  IP CONSULT TO ORTHOPEDIC SURGERY  IP CONSULT TO PRIMARY CARE PROVIDER  IP CONSULT TO NEPHROLOGY  PHARMACY TO DOSE [address] / [facility] / Karen Cancel. is a 77 y.o. male presenting with concerns of finger swelling and pain. Patient completed a 10-day course of Augmentin with worsening of his symptoms. On examination, patient is hemodynamically stable and in no acute distress. Left fourth finger with circumferential swelling present with significant swelling about the pulp of the finger with maximal pain in this area. Mildly fluctuant. Patient is able to fully extend the finger without difficulty and has no pain extending down the extensor tendon sheath at this time, lowering suspicion for flexor tenosynovitis. He is unable to fully flex at the DIP joint secondary to pain and swelling. X-ray is obtained and is concerning for underlying osteomyelitis. Laboratory studies were initiated and inflammatory markers are elevated. I am concerned that patient will require surgical intervention, therefore orthopedic surgery was consulted for evaluation in the morning. Vancomycin was initiated with concern of Augmentin failure outpatient.   Patient's care is turned over to hospitalist team and AOD for ongoing care. Patient will require dialysis tomorrow. This patient was also evaluated by the attending physician. All care plans were discussed and agreed upon. Clinical Impression     1. Felon of finger of left hand with lymphangitis    2. ESRD on dialysis [facility])        Disposition     PATIENT REFERRED TO:  No follow-up provider specified.     DISCHARGE MEDICATIONS:  Current Discharge Medication List          DISPOSITION Admitted 11/02/2021 08:56:01 PM        Lei Avendaño, [address]  11/02/21 5899

## 2021-11-02 NOTE — TELEPHONE ENCOUNTER
Reason for Disposition   [1] Cut AND [2] numbness (loss of sensation) of finger    Answer Assessment - Initial Assessment Questions  1. MECHANISM: \"How did the injury happen? \"       Hurt finger while cutting his nails     2. ONSET: \"When did the injury happen? \" (Minutes or hours ago)      Was seen after it \"swelled up\"    3. LOCATION: \"What part of the finger is injured? \" \"Is the nail damaged? \"       Ring finger on the left hand     4. APPEARANCE of the INJURY: \"What does the injury look like? \"       \"his finger is getting bigger everyday- Dr. Mendel Grippe said it was infected\"   -the finger is black - no redness     5. SEVERITY: \"Can you use the hand normally? \"  \"Can you bend your fingers into a ball and then fully open them? \"      Unable to make a fist    6. SIZE: For cuts, bruises, or swelling, ask: \"How large is it? \" (e.g., inches or centimeters;  entire finger)       \"the finger is twice as big as the other finger\"    7. PAIN: \"Is there pain? \" If so, ask: \"How bad is the pain? \"    (e.g., Scale 1-10; or mild, moderate, severe)      10/10    8. TETANUS: For any breaks in the skin, ask: \"When was the last tetanus booster? \"      Unsure     9. OTHER SYMPTOMS: \"Do you have any other symptoms? \"      Denies numbness or tingling  - denies fever     10. PREGNANCY: \"Is there any chance you are pregnant? \" \"When was your last menstrual period? \"        N/A    Protocols used: FINGER INJURY-ADULT-    Received call from [address]  at [facility] with Red Flag Complaint. Brief description of triage: finger injury after clipping nails. Pt reports the nail is painful 10/10 and has turned black today 11/2/2021. Triage indicates for patient to Go to ED now. Pt is unsure if he will proceed to ED. Discussed that symptoms reported could potentially be life threatening. His wife states that she will attempt to persuade pt to go to ED, but is unsure if he will go.      Care advice provided, patient verbalizes understanding;

## 2021-11-03 ENCOUNTER — APPOINTMENT (OUTPATIENT)
Dept: MRI IMAGING | Age: 76
DRG: 513 | End: 2021-11-03
Payer: MEDICARE

## 2021-11-03 LAB
ALBUMIN SERPL-MCNC: 3.7 G/DL (ref 3.4–5)
ANION GAP SERPL CALCULATED.3IONS-SCNC: 13 MMOL/L (ref 3–16)
ANTI-XA UNFRAC HEPARIN: 0.06 IU/ML (ref 0.3–0.7)
APTT: 37.7 SEC (ref 26.2–38.6)
APTT: 38 SEC (ref 26.2–38.6)
BASOPHILS ABSOLUTE: 0.1 K/UL (ref 0–0.2)
BASOPHILS RELATIVE PERCENT: 1 %
BUN BLDV-MCNC: 21 MG/DL (ref 7–20)
CALCIUM SERPL-MCNC: 9.4 MG/DL (ref 8.3–10.6)
CHLORIDE BLD-SCNC: 93 MMOL/L (ref 99–110)
CO2: 26 MMOL/L (ref 21–32)
CREAT SERPL-MCNC: 5.8 MG/DL (ref 0.8–1.3)
EOSINOPHILS ABSOLUTE: 0.1 K/UL (ref 0–0.6)
EOSINOPHILS RELATIVE PERCENT: 1.6 %
FOLATE: >20 NG/ML (ref 4.78–24.2)
GFR AFRICAN AMERICAN: 12
GFR NON-AFRICAN AMERICAN: 10
GLUCOSE BLD-MCNC: 89 MG/DL (ref 70–99)
HBV SURFACE AB TITR SER: 15.1 MIU/ML
HCT VFR BLD CALC: 38.2 % (ref 40.5–52.5)
HCT VFR BLD CALC: 40.9 % (ref 40.5–52.5)
HEMOGLOBIN: 12.9 G/DL (ref 13.5–17.5)
HEMOGLOBIN: 13.5 G/DL (ref 13.5–17.5)
HEPATITIS B SURFACE ANTIGEN INTERPRETATION: NORMAL
INR BLD: 1.22 (ref 0.88–1.12)
INR BLD: 1.25 (ref 0.88–1.12)
LYMPHOCYTES ABSOLUTE: 1 K/UL (ref 1–5.1)
LYMPHOCYTES RELATIVE PERCENT: 18.8 %
MAGNESIUM: 2.5 MG/DL (ref 1.8–2.4)
MCH RBC QN AUTO: 34.8 PG (ref 26–34)
MCH RBC QN AUTO: 35.1 PG (ref 26–34)
MCHC RBC AUTO-ENTMCNC: 33.1 G/DL (ref 31–36)
MCHC RBC AUTO-ENTMCNC: 33.8 G/DL (ref 31–36)
MCV RBC AUTO: 103.9 FL (ref 80–100)
MCV RBC AUTO: 105.1 FL (ref 80–100)
MONOCYTES ABSOLUTE: 1 K/UL (ref 0–1.3)
MONOCYTES RELATIVE PERCENT: 19.4 %
NEUTROPHILS ABSOLUTE: 3.1 K/UL (ref 1.7–7.7)
NEUTROPHILS RELATIVE PERCENT: 59.2 %
PDW BLD-RTO: 16.4 % (ref 12.4–15.4)
PDW BLD-RTO: 17.1 % (ref 12.4–15.4)
PHOSPHORUS: 5.4 MG/DL (ref 2.5–4.9)
PLATELET # BLD: 172 K/UL (ref 135–450)
PLATELET # BLD: 185 K/UL (ref 135–450)
PMV BLD AUTO: 7.9 FL (ref 5–10.5)
PMV BLD AUTO: 8.2 FL (ref 5–10.5)
POTASSIUM SERPL-SCNC: 4.6 MMOL/L (ref 3.5–5.1)
PROTHROMBIN TIME: 13.9 SEC (ref 9.9–12.7)
PROTHROMBIN TIME: 14.3 SEC (ref 9.9–12.7)
RBC # BLD: 3.68 M/UL (ref 4.2–5.9)
RBC # BLD: 3.89 M/UL (ref 4.2–5.9)
SODIUM BLD-SCNC: 132 MMOL/L (ref 136–145)
VANCOMYCIN RANDOM: 13.2 UG/ML
VITAMIN B-12: 1657 PG/ML (ref 211–911)
WBC # BLD: 4 K/UL (ref 4–11)
WBC # BLD: 5.2 K/UL (ref 4–11)

## 2021-11-03 PROCEDURE — 85025 COMPLETE CBC W/AUTO DIFF WBC: CPT

## 2021-11-03 PROCEDURE — 85730 THROMBOPLASTIN TIME PARTIAL: CPT

## 2021-11-03 PROCEDURE — 6370000000 HC RX 637 (ALT 250 FOR IP): Performed by: STUDENT IN AN ORGANIZED HEALTH CARE EDUCATION/TRAINING PROGRAM

## 2021-11-03 PROCEDURE — 86706 HEP B SURFACE ANTIBODY: CPT

## 2021-11-03 PROCEDURE — 6360000002 HC RX W HCPCS: Performed by: STUDENT IN AN ORGANIZED HEALTH CARE EDUCATION/TRAINING PROGRAM

## 2021-11-03 PROCEDURE — 86704 HEP B CORE ANTIBODY TOTAL: CPT

## 2021-11-03 PROCEDURE — 80069 RENAL FUNCTION PANEL: CPT

## 2021-11-03 PROCEDURE — 85027 COMPLETE CBC AUTOMATED: CPT

## 2021-11-03 PROCEDURE — 87340 HEPATITIS B SURFACE AG IA: CPT

## 2021-11-03 PROCEDURE — 99232 SBSQ HOSP IP/OBS MODERATE 35: CPT | Performed by: HOSPITALIST

## 2021-11-03 PROCEDURE — 97530 THERAPEUTIC ACTIVITIES: CPT

## 2021-11-03 PROCEDURE — 5A1D70Z PERFORMANCE OF URINARY FILTRATION, INTERMITTENT, LESS THAN 6 HOURS PER DAY: ICD-10-PCS | Performed by: STUDENT IN AN ORGANIZED HEALTH CARE EDUCATION/TRAINING PROGRAM

## 2021-11-03 PROCEDURE — 2580000003 HC RX 258: Performed by: STUDENT IN AN ORGANIZED HEALTH CARE EDUCATION/TRAINING PROGRAM

## 2021-11-03 PROCEDURE — 82607 VITAMIN B-12: CPT

## 2021-11-03 PROCEDURE — 82746 ASSAY OF FOLIC ACID SERUM: CPT

## 2021-11-03 PROCEDURE — 1200000000 HC SEMI PRIVATE

## 2021-11-03 PROCEDURE — 90935 HEMODIALYSIS ONE EVALUATION: CPT

## 2021-11-03 PROCEDURE — 97165 OT EVAL LOW COMPLEX 30 MIN: CPT

## 2021-11-03 PROCEDURE — 36415 COLL VENOUS BLD VENIPUNCTURE: CPT

## 2021-11-03 PROCEDURE — 85520 HEPARIN ASSAY: CPT

## 2021-11-03 PROCEDURE — 97161 PT EVAL LOW COMPLEX 20 MIN: CPT

## 2021-11-03 PROCEDURE — 85610 PROTHROMBIN TIME: CPT

## 2021-11-03 PROCEDURE — 87040 BLOOD CULTURE FOR BACTERIA: CPT

## 2021-11-03 PROCEDURE — 97116 GAIT TRAINING THERAPY: CPT

## 2021-11-03 PROCEDURE — 80202 ASSAY OF VANCOMYCIN: CPT

## 2021-11-03 PROCEDURE — 83735 ASSAY OF MAGNESIUM: CPT

## 2021-11-03 RX ORDER — HEPARIN SODIUM 1000 [USP'U]/ML
60 INJECTION, SOLUTION INTRAVENOUS; SUBCUTANEOUS ONCE
Status: DISCONTINUED | OUTPATIENT
Start: 2021-11-03 | End: 2021-11-04

## 2021-11-03 RX ORDER — MIDODRINE HYDROCHLORIDE 5 MG/1
5 TABLET ORAL
COMMUNITY

## 2021-11-03 RX ORDER — AMIODARONE HYDROCHLORIDE 200 MG/1
200 TABLET ORAL NIGHTLY
Status: DISCONTINUED | OUTPATIENT
Start: 2021-11-03 | End: 2021-11-07 | Stop reason: HOSPADM

## 2021-11-03 RX ORDER — HEPARIN SODIUM 10000 [USP'U]/100ML
5-30 INJECTION, SOLUTION INTRAVENOUS CONTINUOUS
Status: ACTIVE | OUTPATIENT
Start: 2021-11-03 | End: 2021-11-04

## 2021-11-03 RX ORDER — OXYCODONE HYDROCHLORIDE AND ACETAMINOPHEN 5; 325 MG/1; MG/1
1 TABLET ORAL EVERY 6 HOURS PRN
Status: DISCONTINUED | OUTPATIENT
Start: 2021-11-03 | End: 2021-11-03

## 2021-11-03 RX ORDER — HEPARIN SODIUM 1000 [USP'U]/ML
60 INJECTION, SOLUTION INTRAVENOUS; SUBCUTANEOUS PRN
Status: DISCONTINUED | OUTPATIENT
Start: 2021-11-03 | End: 2021-11-04

## 2021-11-03 RX ORDER — HEPARIN SODIUM 1000 [USP'U]/ML
30 INJECTION, SOLUTION INTRAVENOUS; SUBCUTANEOUS PRN
Status: DISCONTINUED | OUTPATIENT
Start: 2021-11-03 | End: 2021-11-04

## 2021-11-03 RX ORDER — PANTOPRAZOLE SODIUM 40 MG/1
40 TABLET, DELAYED RELEASE ORAL
Status: DISCONTINUED | OUTPATIENT
Start: 2021-11-03 | End: 2021-11-07 | Stop reason: HOSPADM

## 2021-11-03 RX ORDER — TORSEMIDE 20 MG/1
20 TABLET ORAL SEE ADMIN INSTRUCTIONS
Status: DISCONTINUED | OUTPATIENT
Start: 2021-11-03 | End: 2021-11-03

## 2021-11-03 RX ORDER — TORSEMIDE 20 MG/1
20 TABLET ORAL 2 TIMES DAILY
Status: DISCONTINUED | OUTPATIENT
Start: 2021-11-03 | End: 2021-11-07 | Stop reason: HOSPADM

## 2021-11-03 RX ORDER — SODIUM CHLORIDE 9 MG/ML
INJECTION, SOLUTION INTRAVENOUS CONTINUOUS
Status: ACTIVE | OUTPATIENT
Start: 2021-11-04 | End: 2021-11-04

## 2021-11-03 RX ADMIN — SODIUM CHLORIDE, PRESERVATIVE FREE 10 ML: 5 INJECTION INTRAVENOUS at 21:57

## 2021-11-03 RX ADMIN — TORSEMIDE 20 MG: 20 TABLET ORAL at 10:31

## 2021-11-03 RX ADMIN — VANCOMYCIN HYDROCHLORIDE 1000 MG: 10 INJECTION, POWDER, LYOPHILIZED, FOR SOLUTION INTRAVENOUS at 11:48

## 2021-11-03 RX ADMIN — AMIODARONE HYDROCHLORIDE 200 MG: 200 TABLET ORAL at 21:57

## 2021-11-03 RX ADMIN — HEPARIN SODIUM 5000 UNITS: 5000 INJECTION INTRAVENOUS; SUBCUTANEOUS at 13:08

## 2021-11-03 RX ADMIN — CEFEPIME HYDROCHLORIDE 1000 MG: 1 INJECTION, POWDER, FOR SOLUTION INTRAMUSCULAR; INTRAVENOUS at 10:33

## 2021-11-03 RX ADMIN — HYDROMORPHONE HYDROCHLORIDE 0.25 MG: 1 INJECTION, SOLUTION INTRAMUSCULAR; INTRAVENOUS; SUBCUTANEOUS at 13:08

## 2021-11-03 RX ADMIN — SERTRALINE HYDROCHLORIDE 50 MG: 50 TABLET ORAL at 10:31

## 2021-11-03 RX ADMIN — HEPARIN SODIUM 5000 UNITS: 5000 INJECTION INTRAVENOUS; SUBCUTANEOUS at 05:20

## 2021-11-03 ASSESSMENT — PAIN SCALES - GENERAL: PAINLEVEL_OUTOF10: 8

## 2021-11-03 ASSESSMENT — ENCOUNTER SYMPTOMS
ABDOMINAL PAIN: 0
NAUSEA: 0
APNEA: 0
VOICE CHANGE: 0
DIARRHEA: 0
CHOKING: 0
ABDOMINAL DISTENTION: 0
STRIDOR: 0
COUGH: 0
CHEST TIGHTNESS: 0
WHEEZING: 0
TROUBLE SWALLOWING: 0
PHOTOPHOBIA: 0
CONSTIPATION: 0
SHORTNESS OF BREATH: 0
VOMITING: 0

## 2021-11-03 NOTE — PROGRESS NOTES
4 Eyes Admission Assessment     I agree as the admission nurse that 2 RN's have performed a thorough Head to Toe Skin Assessment on the patient. ALL assessment sites listed below have been assessed on admission. Areas assessed by both nurses:   [x]   Head, Face, and Ears   [x]   Shoulders, Back, and Chest  [x]   Arms, Elbows, and Hands   [x]   Coccyx, Sacrum, and Ischium  [x]   Legs, Feet, and Heels        Does the Patient have Skin Breakdown?   No         Ricky Prevention initiated:  No   Wound Care Orders initiated:  No      [facility] nurse consulted for Pressure Injury (Stage 3,4, Unstageable, DTI, NWPT, and Complex wounds) or Ricky score 18 or lower:  No      Nurse 1 eSignature: Electronically signed by Gerhardt Hones, RN on 11/3/21 at 12:37 AM EDT    **SHARE this note so that the co-signing nurse is able to place an eSignature**    Nurse 2 eSignature: Electronically signed by Camryn Rainey RN on 11/3/21 at 6:01 AM EDT

## 2021-11-03 NOTE — PLAN OF CARE
ORTHO POC    78yo male with Afib, ESRD on HD who was admitted last night with progressive infection LEFT RING Finger despite Rx PO Abx. XRays suggest lytic osteomyelitis distal phalanx. Acute phase reactants very elevated. Attempted to see patient who is off the floor for dialysis    MRI pending to evaluate extent osteomyelitis and assist determine level amputation    I have discussed case with Dr Madisyn Perkins, hand specialist, to assist in care. Plan: we will await MRI for surgical planning. Possible digital amputation Thurs or Fri.     Please call with questions or concerns    [place] [address] Division  [facility]  [phone] (cell)

## 2021-11-03 NOTE — CONSULTS
Clinical Pharmacy Progress Note    Vancomycin - Management by Pharmacy    Consult Date(s): 11/2/2021  Consulting Provider(s): Dr. 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 Have entered placeholder onto [facility] o Received vancomycin 1000 mg IV x1 overnight. o Vancomycin level today = 13.2 mcg/mL. o Will order vancomycin 1000 mg IV x1 with HD today. o Plan to check next level on Fri 11/5 prior to HD.  Will continue to monitor clinical condition and make adjustments to regimen as appropriate. Please call with questions--  Perla GalloD, BCPS  Wireless: [id number]   11/3/2021 10:07 AM      Subjective/Objective: Mr. Avelina Garcia. is a 77 y.o. male with a PMHx significant for ESRD on HD, HTN, HFrEF, AFib who presented to ED 11/2 with 1 week history of pain and swollen L 4th finger. Pt presented to PCP on 10/21 and was started on 10-day course of Augmentin. In ED 11/2, XRay of L hand was concerning for osteomyelitis. [facility] has been consulted to dose vancomycin.     Pertinent Antimicrobials:  Vancomycin - pharmacy to dose (11/2-current)   Intermittent dosing (11/2-current)  Date Dose Vanc Level   11/2 1000 mg IV    11/3  13.2 mcg/mL          Height:   Ht Readings from Last 1 Encounters:   11/02/21 5' 6\" (1.676 m)     Weight:   Wt Readings from Last 1 Encounters:   11/02/21 144 lb 13.5 oz (65.7 kg)     Cultures & Sensitivities:    Date Site Micro Susceptibility / Result    Blood             Labs / Ancillary Data:    Est CrCl not calculated d/t ESRD on HD    Recent Labs     11/02/21  1911 11/03/21  0417   CREATININE 5.7* 5.8*   BUN 18 21*   WBC 4.5 5.2

## 2021-11-03 NOTE — CONSULTS
Consult received. Labs and notes were reviewed. Case was discussed with the staff. Full note to follow.     Thanks  Nephrology  [address]  Office: [phone]  Cell: [phone]  Fax: [phone]

## 2021-11-03 NOTE — PROGRESS NOTES
Clinical Pharmacy Progress Note    IV Vancomycin - Management by Pharmacy    Consult Date(s): 11/2/21    Consulting Provider(s): Dr. Adriane Fields / Plan    Indication: osteomyelitis - Vancomycin   Concurrent Antimicrobials: None   Day of Vanc Therapy: None   Current Dosing Method: Intermittent Dosing by Levels   Therapeutic Goal: ~ 15 mg/L   Current Dose / Frequency: Intermittent   Plan / Rationale:   o Given that patient has ESRD, will dose based on levels at this time. o Patient received vancomycin 1000 mg IV x1 dose today in the ED   Will continue to monitor clinical condition and make adjustments to regimen as appropriate. Thank you for consulting Pharmacy! Yadira Randhawa [facility]      Subjective/Objective: Mr. Gisell Sherman. is a 77 y.o. male with a PMHx significant for ESRD on HD, HTN, HFrEF, and Afib, admitted for Osteomyelitis of the left 4th phalange. Pharmacy has been consulted to dose vancomycin. Height:   Ht Readings from Last 1 Encounters:   11/02/21 5' 6\" (1.676 m)     Weight:   Wt Readings from Last 1 Encounters:   11/02/21 144 lb 13.5 oz (65.7 kg)       Level(s) / Doses:    Date Time Dose Level / Type of Level Interpretation                 Note: Serum levels collected for AUC-based dosing may be high if collected in close proximity to the dose administered. This is not necessarily an indicator of toxicity. Cultures & Sensitivities:    Date Site Micro Susceptibility / Result   11/2/21 Bloodx2 pending            Labs / Ancillary Data:    Estimated Creatinine Clearance: 10 mL/min (A) (based on SCr of 5.7 mg/dL [facility])).     Recent Labs     11/02/21  1911   CREATININE 5.7*   BUN 18   WBC 4.5

## 2021-11-03 NOTE — ED PROVIDER NOTES
ED Attending Attestation Note     Date of evaluation: 11/2/2021    This patient was seen by the advance practice provider. I have seen and examined the patient, agree with the workup, evaluation, management and diagnosis. The care plan has been discussed. My assessment reveals patient with left fourth digit with felon extending into his flexor tendon sheath. Osteomyelitis on x-ray patient with a history of dialysis end-stage renal disease with a current creatinine of 5.7 and potassium 4.5. Patient was started on antibiotics here. Hospitalist and Ortho consulted.      Nicole Cooper MD  11/02/21 2027

## 2021-11-03 NOTE — PROGRESS NOTES
Progress Note    Admit Date: 11/2/2021  Diet: Diet NPO    CC: Left ring finger w/ swelling and pain    Interval history:  -NAEO  -Afeb, HDS, on RA  -Patient to be seen by Orthopedics today for possible surgical mgmt  -Nephrology c/s'd for pt's ongoing HD needs  -Patient curious if he could eat today, informed patient that his finger may need surgery soon and that we'd need to keep him NPO for the time being. Medications:     Scheduled Meds:   amiodarone  200 mg Oral Nightly    pantoprazole  40 mg Oral QAM AC    sertraline  50 mg Oral Daily    torsemide  20 mg Oral BID    cefepime  1,000 mg IntraVENous Q24H    vancomycin  1,000 mg IntraVENous Once    sodium chloride flush  5-40 mL IntraVENous 2 times per day    heparin (porcine)  5,000 Units SubCUTAneous 3 times per day    vancomycin (VANCOCIN) intermittent dosing (placeholder)   Other See Admin Instructions     Continuous Infusions:   sodium chloride       PRN Meds:sodium chloride flush, sodium chloride, ondansetron **OR** ondansetron, polyethylene glycol, acetaminophen **OR** acetaminophen    Objective:   Vitals:   T-max:  Patient Vitals for the past 8 hrs:   BP Temp Temp src Pulse Resp SpO2   11/03/21 0515 (!) 138/92 98.5 °F (36.9 °C) Oral 84 16 93 %       Intake/Output Summary (Last 24 hours) at 11/3/2021 1044  Last data filed at 11/2/2021 2231  Gross per 24 hour   Intake 240 ml   Output    Net 240 ml       Review of Systems   Constitutional: Negative for chills, diaphoresis, fatigue and fever. HENT: Negative for congestion. Eyes: Negative for visual disturbance. Respiratory: Negative for cough and shortness of breath. Cardiovascular: Negative for chest pain, palpitations and leg swelling. Gastrointestinal: Negative for abdominal distention, abdominal pain, constipation, diarrhea, nausea and vomiting. Endocrine: Negative for polyuria.    Genitourinary: Negative for decreased urine volume, difficulty urinating, dysuria, frequency, hematuria and urgency. Musculoskeletal: Positive for joint swelling (Left Finger). Skin: Positive for wound (Small wound on nailbed, non-purulent). Neurological: Negative for dizziness, syncope, weakness, light-headedness, numbness and headaches. Physical Exam  Constitutional:       General: He is not in acute distress. Appearance: Normal appearance. He is normal weight. He is not ill-appearing. HENT:      Head: Normocephalic and atraumatic. Right Ear: External ear normal.      Left Ear: External ear normal.      Nose: Nose normal.      Mouth/Throat:      Mouth: Mucous membranes are moist.      Pharynx: Oropharynx is clear. Eyes:      Extraocular Movements: Extraocular movements intact. Conjunctiva/sclera: Conjunctivae normal.      Pupils: Pupils are equal, round, and reactive to light. Cardiovascular:      Rate and Rhythm: Normal rate and regular rhythm. Heart sounds: Normal heart sounds. No murmur heard. No friction rub. No gallop. Pulmonary:      Effort: Pulmonary effort is normal. No respiratory distress. Breath sounds: Normal breath sounds. No wheezing, rhonchi or rales. Abdominal:      General: Abdomen is flat. There is no distension. Palpations: Abdomen is soft. Tenderness: There is no abdominal tenderness. There is no guarding or rebound. Musculoskeletal:         General: Swelling, tenderness and signs of injury present. Cervical back: Normal range of motion and neck supple. Right lower leg: No edema. Left lower leg: No edema. Comments: Left ring finger w/ fusiform swelling and decreased ROM. No purulence, small lesion adjacent to nailbed. Skin:     General: Skin is warm and dry. Capillary Refill: Capillary refill takes less than 2 seconds. Findings: No lesion. Neurological:      General: No focal deficit present. Mental Status: He is alert and oriented to person, place, and time.    Psychiatric:         Mood and Affect: Mood normal.         Thought Content: Thought content normal.       LABS:    CBC:   Recent Labs     11/02/21 1911 11/03/21 0417   WBC 4.5 5.2   HGB 13.1* 13.5   HCT 40.7 40.9    172   .6* 105.1*     Renal:    Recent Labs     11/02/21 1911 11/03/21 0417    132*   K 4.5 4.6   CL 95* 93*   CO2 31 26   BUN 18 21*   CREATININE 5.7* 5.8*   GLUCOSE 89 89   CALCIUM 9.3 9.4   MG  --  2.50*   PHOS  --  5.4*   ANIONGAP 11 13     Hepatic:   Recent Labs     11/03/21 0417   LABALBU 3.7     Troponin: No results for input(s): TROPONINI in the last 72 hours. BNP: No results for input(s): BNP in the last 72 hours. Lipids: No results for input(s): CHOL, HDL in the last 72 hours. Invalid input(s): LDLCALCU, TRIGLYCERIDE  ABGs:  No results for input(s): PHART, LUV6DKN, PO2ART, GFR1UOQ, BEART, THGBART, H4ZUCCKO, XZE5IMC in the last 72 hours. INR:   Recent Labs     11/03/21 0417   INR 1.25*     Lactate: No results for input(s): LACTATE in the last 72 hours. Cultures:  -----------------------------------------------------------------  RAD:   XR HAND LEFT (MIN 3 VIEWS)   Final Result      1. Abnormal lytic change affecting the distal phalanx of the fourth finger with overlying soft tissue swelling and ostial lysis consistent most likely with osteomyelitis of the finger with advanced lytic distal phalanx bone destruction      2. Advanced arthritic change affecting the base of the thumb at the first carpal-metacarpal articulation and advanced joint space loss at the first and second metacarpal phalangeal joint with subchondral erosions of the first and second metacarpal head                Assessment/Plan:   77year old male with am extensive past medical history significant for ESRD on HD, HTN, HFrEF, and Afib who presents w/ a swollen and painful left ring finger. Osteomyelitis of the left 4th phalange   Prior trauma of the finger after cutting his nail about 2 weeks ago.  Significant edema and tenderness. Pt completed course of Augmentin as outpt   XR of the left hand showed OM of the finger, Elevated ESR and CRP  -BCx2  -Abx: Vanc and Cefepime, renal dosing per pharm  -Ortho C/s'd for possible debridement and rule-out of flexor tenosynovitis (unlikely)  -Pain control: Percocet Q6H     Macrocytic anemia   H/H 13.1/40.7, .6  -B12, Folate Pending    Chronic Issues:  ESRD on HD (MWF)- Nephro c/s  HFrEF- Home Amiodarone & Torsemide  Essential HTN- Per above   Afib- Rate control w/ Amio, holding Anticoagulation for possible procedure  MDD- Home Sertaline    Code Status: FULL  FEN: NPO  PPX: SQH  DISPO: [facility]    Chrissy Lomas MD, PGY-1  11/03/21  10:44 AM    This patient has been staffed and discussed with Casey Coyne MD.     Addendum to Resident H& P/Progress note:  I have personally seen,examined and evaluated the patient. I have reviewed the current history, physical findings, labs and assessment and plan and agree with note as documented by resident MD ( )  MRI of the left hand will be ordered. Discussed the patient's condition with his daughter and RN.     Casey Coyne MD, FACP

## 2021-11-03 NOTE — CONSULTS
Nephrology Consult Note        [facility]    [email or web address]       Phone: [phone]                                                  11/3/2021 11:35 AM     Patient: Carlos Montalvo. [id number]  [id number]  Date of Admit: 11/2/2021 LOS: 1 days    Plan:  HD today per schedule with UF to his EDW  Hb stable. No need of HARSHAD  Monitor Phos while here. No phosphate binder while inpatient. Avoid nephrotoxins  Monitor input, output and daily weight  Renally dose all medications    Informed HD charge nurse and dialysis orders placed. Thank you for allowing us to participate in this patient's care    In case of any question please call us at our 24 hour answering service [phone] or from 7 AM to 5 PM via [employer] or cell number    Citllai Crow MD  [facility] Nephrology  [facility] [address]  [place], [address]  Fax: [phone]  Office: [phone]       Assessment & Plan     Renal function:  ESRD  iHD MWF  AVF    Electrolytes:  Lab Results   Component Value Date    CREATININE 5.8 ([facility]) 11/03/2021    BUN 21 (H) 11/03/2021     (L) 11/03/2021    K 4.6 11/03/2021    CL 93 (L) 11/03/2021    CO2 26 11/03/2021            # CKD MBD  No PTH available in system. Monitor Phos level  Ca level OK    Lab Results   Component Value Date    CALCIUM 9.4 11/03/2021    PHOS 5.4 (H) 11/03/2021         Acid/Base status:  Stable. Volume status/BP:  BP stable. No acute volume overload concerns.      Intake/Output Summary (Last 24 hours) at 11/3/2021 1135  Last data filed at 11/2/2021 2231  Gross per 24 hour   Intake 240 ml   Output    Net 240 ml         Hematology:   Lab Results   Component Value Date    IRON 74 08/10/2016    TIBC 202 (L) 08/10/2016     Lab Results   Component Value Date    WBC 5.2 11/03/2021    HGB 13.5 11/03/2021    HCT 40.9 11/03/2021    .1 (H) 11/03/2021     11/03/2021           Reason for Consult and Chief Complaint     Reason for consult: ESRD on HD    Chief complaint:   Chief Complaint   Patient presents with    Hand Pain     pt c/o a swollen 4th digit and hand on the left hand x 4 weeks and getting worse. Two dyas ago became black and more swollen after antibiotics. History of Present Illness   Carlos Larkin is a 77 y.o. with PMH significant for ESRD on HD MWF, HTN, HFrEF, and Afib admitted overnight with pain and swelling of left hand finger/hand since last one week. Imaging showed concern for osteomyelitis and he is on Antibiotics. Patient otherwise denies SOB, chest pain, GI symptoms. Last dialysis was on Monday as per his schedule and no issues with AVF    Review of Systems     GEN: No recent fever, chills or night sweats  HEENT: No changes in vision, sore throat, rhinorrhea   CVS: No cp, palpitations or swelling in legs  Pulmonary: No cough or hemoptysis or SOB  GI: No nausea, vomiting, diarrhea, constipation or abdominal pain  : No bowel or bladder incontinence, no dysuria, no hematuria. MSK:  Left hand/finger pain. Skin: No rashes. CNS: No headache, dizziness, confusion or focal weakness. No seizure. Psych: No anxiety, agitation or depression. Reviewed all 12 systems, negative except as above.      Past Medical History     Past Medical History:   Diagnosis Date    Cardiomyopathy [facility])     Cerebrovascular disease     [facility]est[facility] failure) ([facility] [address]     Dialysis patient [facility])     [facility]-F [facility]    H/O medication noncompliance     HTN (hypertension) 8/16/2011    Other and unspecified hyperlipidemia 8/16/2011    Pacemaker     Presence of implantable cardioverter-defibrillator (ICD)     Sleep disorder     Tachycardia 8/16/2011    TIA (transient ischemic attack) 8/16/2011         Past Surgical History     Past Surgical History:   Procedure Laterality Date    [facility]    DIALYSIS FISTULA CREATION Left 3/4/2021    LEFT BACHIAL CEPHALIC FISTULA CREATION performed by Kamaljit Sarabia [facility] at [facility] 36 Bilateral [id number]    PACEMAKER PLACEMENT  2013         Family History     Family History   Problem Relation Age of Onset   Daniel Matos Osteoarthritis Father     Hypertension Father     Cancer Father         prostate, rectal    Stroke Father     ADHD Sister          Social History     Social History     Tobacco Use    Smoking status: Current Every Day Smoker     Packs/day: 0.25     Years: 30.00     Pack years: 7.50     Types: Cigarettes     Last attempt to quit: 10/13/2018     Years since quitting: 3.0    Smokeless tobacco: Never Used    Tobacco comment: pt has cut down   Substance Use Topics    Alcohol use: No     Alcohol/week: 0.0 standard drinks          Past medical, family, and social histories were reviewed as previously documented. Updates were made as necessary. Inpatient Medications and Allergies       Scheduled Meds:   amiodarone  200 mg Oral Nightly    pantoprazole  40 mg Oral QAM AC    sertraline  50 mg Oral Daily    torsemide  20 mg Oral BID    cefepime  1,000 mg IntraVENous Q24H    vancomycin  1,000 mg IntraVENous Once    sodium chloride flush  5-40 mL IntraVENous 2 times per day    heparin (porcine)  5,000 Units SubCUTAneous 3 times per day    vancomycin (VANCOCIN) intermittent dosing (placeholder)   Other See Admin Instructions       Allergies: Allergies   Allergen Reactions    Corn-Containing Products      Other reaction(s): GI Upset  Only corn kernel         Vital Signs     Vitals:    11/03/21 0515   BP: (!) 138/92   Pulse: 84   Resp: 16   Temp: 98.5 °F (36.9 °C)   SpO2: 93%         Intake/Output Summary (Last 24 hours) at 11/3/2021 1135  Last data filed at 11/2/2021 2231  Gross per 24 hour   Intake 240 ml   Output    Net 240 ml         Physical Exam     General appearance: NAD  Head: Normocephalic, without obvious abnormality, atraumatic   Mouth: Moist mucous membrane  Neck: Supple.    Lungs: Clear to auscultation bilaterally and no respiratory distress on RA  Heart: S1, S2.  Abdomen: soft, non-tender non-distended  Extremities: No leg edema, warm to touch. Right hand/fingers swollen  Skin: No concerning rashes noted  Psych: good eye contact, normal affect  Neuro: AAO x 3  AVF on left arm, Dressed. Good bruit on auscultation.        Laboratory Data     Please see above     Diagnostic Studies   Pertinent images reviewed

## 2021-11-03 NOTE — ED NOTES
Called report to patients wife juan f and gave update about patients disposition.      Oziel Gtz RN  11/02/21 8709

## 2021-11-03 NOTE — H&P
Internal Medicine  PGY 3  History & Physical      CC   Chief Complaint   Patient presents with    Hand Pain     pt c/o a swollen 4th digit and hand on the left hand x 4 weeks and getting worse. Two dyas ago became black and more swollen after antibiotics. History Obtained From:  patient, electronic medical record    HISTORY OF PRESENT ILLNESS:  77year old male with am extensive past medical history significant for ESRD on HD, HTN, HFrEF, and Afib who presented with 1 week history of pain and swollen left 4th finger. The patient reports that pain started after he tried to cut his nails last week. Over the course of the week, he started to have increasing pain and swelling in the finger. He was seen by his PCP on 10/21/2021 for the concern of cellulitis of the finger and was given 10 day course of Augmentin. However, pain was not improved and today his finger so swollen to the point that he is not able to make a full fist. He called his PCP office and was advised to come to the ED for further evaluation. Does not have any similar episodes in the past. Denies any fever, chills, generalized fatigue, N/V, joint pain, muscle ache. In the ED, vital signs are normal temp 97.8, /90. His labs show WBC 4.5, Sed rate elevated to >120, CRP > 47.3. XR of his left hand shows findings concerning for osteomyelitis. He was given one dose of Vancomycin in the ED for empiric coverage.       Past Medical History:        Diagnosis Date    Cardiomyopathy [facility])     Cerebrovascular disease     [facility] [address]     Dialysis patient [facility])     [facility]    H/O medication noncompliance     HTN (hypertension) 8/16/2011    Other and unspecified hyperlipidemia 8/16/2011    Pacemaker     Presence of implantable cardioverter-defibrillator (ICD)     Sleep disorder     Tachycardia 8/16/2011    TIA (transient ischemic attack) 8/16/2011       Past Surgical History:        Procedure Laterality Date    [facility]    DIALYSIS FISTULA CREATION Left 3/4/2021    LEFT BACHIAL CEPHALIC FISTULA CREATION performed by Esdras Hills MD at [facility] Bilateral [id number]    PACEMAKER PLACEMENT  2013       Medications Priorto Admission:    Medications Prior to Admission: HYDROcodone-acetaminophen (Claressa Dadds) 5-325 MG per tablet, Take 1 tablet by mouth 2 times daily as needed for Pain for up to 30 days. pantoprazole (PROTONIX) 40 MG tablet, Take 1 tablet by mouth  sertraline (ZOLOFT) 50 MG tablet, TAKE ONE TABLET BY MOUTH DAILY. DISCONTINUE FLUOXETINE  Ferric Citrate 1  MG(Fe) TABS, Take by mouth 2 times daily  Multiple Vitamins-Minerals (RENAPLEX-D) TABS, Take by mouth daily  amiodarone (CORDARONE) 200 MG tablet, Take 1 tablet by mouth daily (Patient taking differently: Take 200 mg by mouth nightly )  torsemide (DEMADEX) 20 MG tablet, Take 1 tablet by mouth See Admin Instructions 2 pills daily at 6 am and one daily at noon (Patient taking differently: Take 20 mg by mouth See Admin Instructions 2 pills daily at 6 am and one daily at noon - not taking)  apixaban (ELIQUIS) 2.5 MG TABS tablet, Take by mouth 2 times daily  allopurinol (ZYLOPRIM) 100 MG tablet, Take 1 tablet by mouth daily    Allergies:  Corn-containing products    Social History:   · TOBACCO:   reports that he has been smoking cigarettes. He has a 7.50 pack-year smoking history. He has never used smokeless tobacco.  · ETOH:   reports no history of alcohol use. · DRUGS : denies   · Patient currently lives with family at home   ·   Family History:       Problem Relation Age of Onset    Osteoarthritis Father     Hypertension Father     Cancer Father         prostate, rectal    Stroke Father     ADHD Sister        Review of Systems   Constitutional: Negative for activity change, appetite change, chills, diaphoresis, fatigue, fever and unexpected weight change.    HENT: Negative for trouble swallowing and voice change. Eyes: Negative for photophobia and visual disturbance. Respiratory: Negative for apnea, cough, choking, chest tightness, shortness of breath, wheezing and stridor. Cardiovascular: Negative for chest pain, palpitations and leg swelling. Gastrointestinal: Negative for abdominal distention, abdominal pain, constipation, diarrhea, nausea and vomiting. Genitourinary: Negative for difficulty urinating and dysuria. Skin: Negative for rash and wound. Neurological: Negative for dizziness, weakness and light-headedness. Psychiatric/Behavioral: Negative for agitation and behavioral problems. ROS: A 10 point review of systems was conducted, significant findings as noted in HPI. Physical Exam  Vitals and nursing note reviewed. Constitutional:       General: He is not in acute distress. Appearance: Normal appearance. He is well-developed. He is not ill-appearing, toxic-appearing or diaphoretic. HENT:      Head: Normocephalic and atraumatic. Eyes:      General: No scleral icterus. Conjunctiva/sclera: Conjunctivae normal.      Pupils: Pupils are equal, round, and reactive to light. Cardiovascular:      Rate and Rhythm: Normal rate and regular rhythm. Pulses: Normal pulses. Heart sounds: Normal heart sounds. No murmur heard. No friction rub. No gallop. Pulmonary:      Effort: Pulmonary effort is normal. No respiratory distress. Breath sounds: Normal breath sounds. No wheezing or rales. Chest:      Chest wall: No tenderness. Abdominal:      General: Bowel sounds are normal. There is no distension. Palpations: Abdomen is soft. Tenderness: There is no abdominal tenderness. There is no guarding. Musculoskeletal:         General: Swelling (left 4th finger ) present. No tenderness or deformity. Normal range of motion. Cervical back: Normal range of motion and neck supple.       Comments: Left 4th finger with nail intact, edema, tender to palpitation, ROM at DIP and PIIP limited due to pain. No ulcer, no visible wound, or drainage   Skin:     General: Skin is warm and dry. Neurological:      Mental Status: He is alert and oriented to person, place, and time. Cranial Nerves: No cranial nerve deficit. Sensory: No sensory deficit. Psychiatric:         Behavior: Behavior normal.       Physical exam:       Vitals:    11/02/21 2145   BP: 119/84   Pulse: 85   Resp: 16   Temp: 98.2 °F (36.8 °C)   SpO2: 92%       DATA:    Labs:  CBC:   Recent Labs     11/02/21 1911   WBC 4.5   HGB 13.1*   HCT 40.7          BMP:   Recent Labs     11/02/21 1911      K 4.5   CL 95*   CO2 31   BUN 18   CREATININE 5.7*   GLUCOSE 89         XR HAND LEFT (MIN 3 VIEWS)   Final Result      1. Abnormal lytic change affecting the distal phalanx of the fourth finger with overlying soft tissue swelling and ostial lysis consistent most likely with osteomyelitis of the finger with advanced lytic distal phalanx bone destruction      2. Advanced arthritic change affecting the base of the thumb at the first carpal-metacarpal articulation and advanced joint space loss at the first and second metacarpal phalangeal joint with subchondral erosions of the first and second metacarpal head                    ASSESSMENT AND PLAN:    Osteomyelitis of the left 4th phalange   Prior trauma of the finger after cutting his nail about 2 weeks ago. Significant edema and tenderness.    - XR of the left hand showed OM of the finger   - Blood cultures x 2  - Empirically covered with Vancomycin   - Pain control   - Might need ID consult for osteomyelitis   - Follow up on Orthopedic surgery recommendations (consulted by ED)  - Sed rate elevated > 120, CR 47.3    Macrocytic anemia   - H/H 13.1/40.7, .6  - Check Vit B12, Folate     Other chronic issues   ESRD on HD (MWF)  - Consult nephrology for HD management while in patient     Chronic systolic HF   Not in

## 2021-11-03 NOTE — PROGRESS NOTES
Occupational Therapy   Occupational Therapy Initial Assessment, Tx, DC  Date: 11/3/2021   Patient Name: Dennis Herrmann. MRN: [id number]     : 1945    Date of Service: 11/3/2021    Discharge Recommendations: Dennis Herrmann. scored a 18/24 on the AM-PAC ADL Inpatient form. Current research shows that an AM-PAC score of 18 or greater is typically associated with a discharge to the patient's home setting. Based on the patient's AM-PAC score, and their current ADL deficits, it is recommended that the patient have 2-3 sessions per week of Occupational Therapy at d/c to increase the patient's independence. At this time, this patient demonstrates the endurance and safety to discharge home with (home vs OP services) and a follow up treatment frequency of 2-3x/wk. Please see assessment section for further patient specific details. If patient discharges prior to next session this note will serve as a discharge summary. Please see below for the latest assessment towards goals. OT Equipment Recommendations  Equipment Needed: Yes  Mobility Devices: ADL Assistive Devices  ADL Assistive Devices: Shower Chair with back    Assessment   Assessment: Pt from home with spouse, reporting typically ind with ADL and fx mobility though limited. Admit with osteomyelitis of 4th digit, L hand. Pt stating \"I don't walk anymore, I haven't for 10 years\" reporting that he gets around the house and goes to dialysis. Pt appears to be functioning at or close to baseline. Pt completing mobility in room, to and from bathroom. Declining additional ADLs or mobility. Pt with no further OT needs. DC acute OT.   Decision Making: Low Complexity  OT Education: OT Role;Plan of Care;ADL Adaptive Strategies;Transfer Training  Patient Education: verb understanding  Barriers to Learning: motivation  REQUIRES OT FOLLOW UP: No  Activity Tolerance  Activity Tolerance: Patient Tolerated treatment well;Treatment limited secondary to agitation;Patient limited by fatigue  Safety Devices  Safety Devices in place: Yes  Type of devices: All fall risk precautions in place; Left in bed;Bed alarm in place;Gait belt;Nurse notified;Call light within reach           Patient Diagnosis(es): The primary encounter diagnosis was Felon of finger of left hand with lymphangitis. A diagnosis of ESRD on dialysis [facility]) was also pertinent to this visit. has a past medical history of Cardiomyopathy ([facility] [address] Cerebrovascular disease, CHF (congestive heart failure) ([facility] [address] Dialysis patient ([facility] [address] H/O medication noncompliance, HTN (hypertension), Other and unspecified hyperlipidemia, Pacemaker, Presence of implantable cardioverter-defibrillator (ICD), Sleep disorder, Tachycardia, and TIA (transient ischemic attack). has a past surgical history that includes Cardiac defibrillator placement (2013); pacemaker placement (2013); hernia repair (Bilateral, [id number]); and Dialysis fistula creation (Left, 3/4/2021). Restrictions  Position Activity Restriction  Other position/activity restrictions: up with assist    Subjective   General  Chart Reviewed: Yes  Additional Pertinent Hx: 77 y.o. male with a PMHx significant for ESRD on HD, HTN, HFrEF, and Afib, admitted for Osteomyelitis of the left 4th phalange. Referring Practitioner: Sandy Ramey MD  Diagnosis: osteomeylitis  Subjective  Subjective:  In bed upon arrival, initially agreeable to session, becoming more agitated in session, declining, resistive to mobility and particpation    Social/Functional History  Social/Functional History  Lives With: Spouse  Type of Home: House  Home Layout: One level  Home Access: Level entry  Bathroom Shower/Tub: Tub/Shower unit  Bathroom Toilet: Standard  Bathroom Equipment:  (none)  Home Equipment: Cane  ADL Assistance: Independent  Homemaking Assistance:  (shares with wife)  Ambulation Assistance: Independent (with cane at all times)  Transfer Assistance: Independent  Active : No  Patient's  Info: wife drives pt to HD appointments  Occupation: Retired  Type of occupation:  at [employer]"  Leisure & Hobbies: watch TV, read [employer]  Additional Comments: wife is retired and able to assist at d/c       Objective        Orientation  Overall Orientation Status: Impaired  Orientation Level: Oriented to person;Disoriented to time (oriented to place as hospital, disoriented to time)     Balance  Sitting Balance: Independent  Standing Balance: Contact guard assistance (to SBA)  Standing Balance  Time: ~3-5 min  Activity: mobility in room, to and from door, to and from bathroom, declining sitting in chair  Functional Mobility  Functional - Mobility Device: Cane  Activity: To/from bathroom; Other  Assist Level: Contact guard assistance  Functional Mobility Comments: CGA to SBA  ADL  Feeding: Independent; Beverage management  Grooming:  (declined)  LE Dressing: Minimal assistance (hayden slip on shoes)  Additional Comments: declining ADLs 2/2 agitation        Bed mobility  Supine to Sit: Stand by assistance (HOB elevated)  Sit to Supine: Stand by assistance  Transfers  Sit to stand: Contact guard assistance  Stand to sit: Contact guard assistance     Cognition  Overall Cognitive Status: [facility]  Cognition Comment: agitated at times                 LUE AROM (degrees)  LUE AROM : WFL  Left Hand AROM (degrees)  Left Hand AROM: WFL  RUE AROM (degrees)  RUE AROM : WFL  Right Hand AROM (degrees)  Right Hand AROM: [facility]                     AM-PAC Score        AM-PAC Inpatient Daily Activity Raw Score: 18 (11/03/21 0856)  AM-PAC Inpatient ADL T-Scale Score : 38.66 (11/03/21 0856)  ADL Inpatient CMS 0-100% Score: 46.65 (11/03/21 0856)  ADL Inpatient CMS G-Code Modifier : CK (11/03/21 0856)      Therapy Time   Individual Concurrent Group Co-treatment   Time In 0820         Time Out 0843         Minutes 23              Timed Code Treatment Minutes:   13    Total Treatment Minutes:  [address], OT

## 2021-11-03 NOTE — PROGRESS NOTES
Physical Therapy    Facility/Department: [facility] 5T ORTHO/NEURO  Initial Assessment / treatment / discharge    NAME: Robert Becerra. : 1945  MRN: [id number]    Date of Service: 11/3/2021    Discharge Recommendations: Robert Becerra. scored a  on the AM-PAC short mobility form. At this time, no further PT is recommended upon discharge. Recommend patient returns to prior setting with prior services. PT Equipment Recommendations  Equipment Needed: No    Assessment   Assessment: Pt is 77 y.o. male admit with L 4th finger OM. Pt is from home with wife and typically indep with amb with use of cane. Pt requires CGA to SBA for safe amb today with use of cane. Anticipate improved mobility when in his own home. Pt appears to be near his functional baseline. Will sign off. Rec pt return home with prior level of assist.  Prognosis: Good  Decision Making: Low Complexity  PT Education: PT Role;Functional Mobility Training  Patient Education: pt demos understanding  REQUIRES PT FOLLOW UP: No       Patient Diagnosis(es): The primary encounter diagnosis was Felon of finger of left hand with lymphangitis. A diagnosis of ESRD on dialysis [facility]) was also pertinent to this visit. has a past medical history of Cardiomyopathy ([facility] [address] Cerebrovascular disease, CHF (congestive heart failure) ([facility] [address] Dialysis patient ([facility] [address] H/O medication noncompliance, HTN (hypertension), Other and unspecified hyperlipidemia, Pacemaker, Presence of implantable cardioverter-defibrillator (ICD), Sleep disorder, Tachycardia, and TIA (transient ischemic attack). has a past surgical history that includes Cardiac defibrillator placement (); pacemaker placement (); hernia repair (Bilateral, [id number]); and Dialysis fistula creation (Left, 3/4/2021).     Restrictions  Position Activity Restriction  Other position/activity restrictions: up with assist  Vision/Hearing  Vision: Within Functional Limits (wears glasses at all times - glasses not here today, pt states vision [facility] [place] for mobility in room)  Hearing: Within functional limits     Subjective  General  Chart Reviewed: Yes  Additional Pertinent Hx: Admit 11/2 with L hand/finger pain; L hand XR: (+) consistent with OM 4th finger; ortho consult pending; PMHx: CHF, HTN, HD, cardiomyopathy, cerebrovascular disease, pacemaker, TIA  Referring Practitioner: Yasir Chin MD  Diagnosis: OM  Subjective  Subjective: Pt found supine in bed. Agreeable to PT with encouragement. \"I'm in a bad mood. I'm always in a bad mood. \"  Pt smiles at times and states \"thank you\" at end of session. Answers \"yes\" to question about L 4th finger pain but pt does not elaborate or rate.   Pain Screening  Patient Currently in Pain: Yes          Orientation  Orientation  Overall Orientation Status: Impaired (oriented to self and birthdate, \"hospital\" although pt unsure which one, does not attempt to answer current date \"I don't keep up with that anymore\")  Social/Functional History  Social/Functional History  Lives With: Spouse  Type of Home: House  Home Layout: One level  Home Access: Level entry  Bathroom Shower/Tub: Tub/Shower unit  Bathroom Toilet: Standard  Bathroom Equipment:  (none)  Home Equipment: Cane  ADL Assistance: Independent  Homemaking Assistance:  (shares with wife)  Ambulation Assistance: Independent (with cane at all times)  Transfer Assistance: Independent  Active : No  Patient's  Info: wife drives pt to HD appointments  Occupation: Retired  Type of occupation:  at [employer]\"  [address] [place] [address] Avenue: watch TV, read [employer]  Additional Comments: wife is retired and able to assist at d/c  Cognition        Objective          AROM RLE (degrees)  RLE AROM: WFL  AROM LLE (degrees)  LLE AROM : WFL  Strength RLE  Strength RLE: WFL  Strength LLE  Strength LLE: WFL        Bed mobility  Supine to Sit: Stand by assistance (HOB elevated)  Sit to Supine: Stand by assistance  Transfers  Sit to Stand: Contact guard assistance  Stand to sit: Contact guard assistance  Ambulation  Ambulation?: Yes  Ambulation 1  Device: Single point cane  Assistance: Contact guard assistance (progressing to SBA)  Quality of Gait: decreased amadou, appropriate stride length, fairly steady with use of cane, no overt LOB  Distance: 40 ft  Comments: gait quality likely near baseline     Balance  Sitting - Static: Good  Sitting - Dynamic: Good  Standing - Static: Good  Standing - Dynamic: Good      Treatment included gait and transfer training, pt education.   Plan   Plan  Times per week: d/c acute PT  Safety Devices  Type of devices: Call light within reach, Bed alarm in place, Nurse notified, Gait belt, Left in bed    G-Code       OutComes Score                                                  AM-PAC Score  AM-PAC Inpatient Mobility Raw Score : 19 (11/03/21 0858)  AM-PAC Inpatient T-Scale Score : 45.44 (11/03/21 0858)  Mobility Inpatient CMS 0-100% Score: 41.77 (11/03/21 0858)  Mobility Inpatient CMS G-Code Modifier : CK (11/03/21 0858)          Goals          Therapy Time   Individual Concurrent Group Co-treatment   Time In 0817         Time Out 0842         Minutes 25                 Timed Code Treatment Minutes:  10    Total Treatment Minutes:  [address] [facility] [place], [address] [facility], DPT  [id number]

## 2021-11-03 NOTE — PROGRESS NOTES
Clinical Pharmacy Progress Note    Patient currently ordered Cefepime 2000 mg IV q12h. This medication is renally eliminated. Per renal dose adjustment policy, will change to Cefepime 1000 mg IV q24h, based on ESRD on HD. Pharmacy will continue to monitor renal function and adjust dose as necessary. Please call with any questions.   Morgan Villarreal PharmD, BCPS  Wireless: [phone]   11/3/2021 10:01 AM

## 2021-11-03 NOTE — PROGRESS NOTES
Clinical Pharmacy Progress Note  Medication History     Admit Date: 11/2/2021     Spoke with RN who was unable to complete medication history as pt not reliable historian. List of of current medications patient is taking is in progress. Home Medication list in EPIC updated to reflect changes noted below. Source of information: Rx fill history ([facility])    Patient's home pharmacy: [facility] ([phone])     Changes made to medication list:   Medications removed: (include reason, ex: therapy completed, inactive medication)     Medications added:    Midodrine 5 mg TIDWM - last filled 10/10/21  Medication doses adjusted:      Other notes:    Patient recently filled Augmentin 500-125 mg daily x 10 days (filled 10/21/21)   Apixaban last filled 9/10/21 x 90-day supply    Unclear if patient is still taking amiodarone or sertraline - last filled 7/14/2021 x 90-day supply   Unclear if patient still taking torsemide - last filled 11/5/2020     Please call with any questions.   Iva Osborne PharmD, BCPS  Wireless: [phone]   11/3/2021 9:03 AM

## 2021-11-03 NOTE — PROGRESS NOTES
Treatment time: 3.5 hrs    Net UF: 1000 ml    Pre weight: 60.4 kg  Post weight: 59.4 kg  EDW: TBD    Access used: L AVF  Access function: Good    Medications or blood products given: N/A    Regular outpatient schedule: TBD    Summary of response to treatment: Patient tolerated treatment fair. Access ran well at ordered BFR. Homeostasis was achieved at [facility] site. Patient began to get restless and started to get out of bed to leave AR. Patient reopened [facility] site while doing so. Bedding was changed and new gown was placed on patient. Homeostasis was achieved at site again and verified by primary RN and Dialysis RN. Report given to primary RN. Patient returned to room. VSS    Copy of dialysis treatment record placed in chart, to be scanned into EMR.

## 2021-11-04 ENCOUNTER — ANESTHESIA EVENT (OUTPATIENT)
Dept: OPERATING ROOM | Age: 76
DRG: 513 | End: 2021-11-04
Payer: MEDICARE

## 2021-11-04 ENCOUNTER — ANESTHESIA (OUTPATIENT)
Dept: OPERATING ROOM | Age: 76
DRG: 513 | End: 2021-11-04
Payer: MEDICARE

## 2021-11-04 VITALS — SYSTOLIC BLOOD PRESSURE: 102 MMHG | OXYGEN SATURATION: 100 % | DIASTOLIC BLOOD PRESSURE: 65 MMHG

## 2021-11-04 LAB
ALBUMIN SERPL-MCNC: 3.7 G/DL (ref 3.4–5)
ANION GAP SERPL CALCULATED.3IONS-SCNC: 13 MMOL/L (ref 3–16)
ANTI-XA UNFRAC HEPARIN: 0.04 IU/ML (ref 0.3–0.7)
BASOPHILS ABSOLUTE: 0 K/UL (ref 0–0.2)
BASOPHILS RELATIVE PERCENT: 0.9 %
BUN BLDV-MCNC: 18 MG/DL (ref 7–20)
CALCIUM SERPL-MCNC: 8.9 MG/DL (ref 8.3–10.6)
CHLORIDE BLD-SCNC: 95 MMOL/L (ref 99–110)
CO2: 27 MMOL/L (ref 21–32)
CREAT SERPL-MCNC: 4.9 MG/DL (ref 0.8–1.3)
EOSINOPHILS ABSOLUTE: 0.1 K/UL (ref 0–0.6)
EOSINOPHILS RELATIVE PERCENT: 1.3 %
GFR AFRICAN AMERICAN: 14
GFR NON-AFRICAN AMERICAN: 12
GLUCOSE BLD-MCNC: 84 MG/DL (ref 70–99)
HCT VFR BLD CALC: 40 % (ref 40.5–52.5)
HEMOGLOBIN: 13 G/DL (ref 13.5–17.5)
LYMPHOCYTES ABSOLUTE: 0.7 K/UL (ref 1–5.1)
LYMPHOCYTES RELATIVE PERCENT: 14.8 %
MAGNESIUM: 2.2 MG/DL (ref 1.8–2.4)
MCH RBC QN AUTO: 34.6 PG (ref 26–34)
MCHC RBC AUTO-ENTMCNC: 32.5 G/DL (ref 31–36)
MCV RBC AUTO: 106.2 FL (ref 80–100)
MONOCYTES ABSOLUTE: 0.6 K/UL (ref 0–1.3)
MONOCYTES RELATIVE PERCENT: 13.9 %
NEUTROPHILS ABSOLUTE: 3.2 K/UL (ref 1.7–7.7)
NEUTROPHILS RELATIVE PERCENT: 69.1 %
PDW BLD-RTO: 17 % (ref 12.4–15.4)
PHOSPHORUS: 5.3 MG/DL (ref 2.5–4.9)
PLATELET # BLD: 182 K/UL (ref 135–450)
PMV BLD AUTO: 8 FL (ref 5–10.5)
POTASSIUM SERPL-SCNC: 4.7 MMOL/L (ref 3.5–5.1)
RBC # BLD: 3.77 M/UL (ref 4.2–5.9)
SODIUM BLD-SCNC: 135 MMOL/L (ref 136–145)
VANCOMYCIN RANDOM: 18.3 UG/ML
WBC # BLD: 4.6 K/UL (ref 4–11)

## 2021-11-04 PROCEDURE — 3600000014 HC SURGERY LEVEL 4 ADDTL 15MIN: Performed by: ORTHOPAEDIC SURGERY

## 2021-11-04 PROCEDURE — 2580000003 HC RX 258: Performed by: STUDENT IN AN ORGANIZED HEALTH CARE EDUCATION/TRAINING PROGRAM

## 2021-11-04 PROCEDURE — 6370000000 HC RX 637 (ALT 250 FOR IP): Performed by: STUDENT IN AN ORGANIZED HEALTH CARE EDUCATION/TRAINING PROGRAM

## 2021-11-04 PROCEDURE — 2500000003 HC RX 250 WO HCPCS: Performed by: ORTHOPAEDIC SURGERY

## 2021-11-04 PROCEDURE — 36415 COLL VENOUS BLD VENIPUNCTURE: CPT

## 2021-11-04 PROCEDURE — 6360000002 HC RX W HCPCS: Performed by: ORTHOPAEDIC SURGERY

## 2021-11-04 PROCEDURE — 87070 CULTURE OTHR SPECIMN AEROBIC: CPT

## 2021-11-04 PROCEDURE — 6360000002 HC RX W HCPCS: Performed by: STUDENT IN AN ORGANIZED HEALTH CARE EDUCATION/TRAINING PROGRAM

## 2021-11-04 PROCEDURE — 99232 SBSQ HOSP IP/OBS MODERATE 35: CPT | Performed by: HOSPITALIST

## 2021-11-04 PROCEDURE — 2580000003 HC RX 258: Performed by: ORTHOPAEDIC SURGERY

## 2021-11-04 PROCEDURE — 87075 CULTR BACTERIA EXCEPT BLOOD: CPT

## 2021-11-04 PROCEDURE — 7100000000 HC PACU RECOVERY - FIRST 15 MIN: Performed by: ORTHOPAEDIC SURGERY

## 2021-11-04 PROCEDURE — 80069 RENAL FUNCTION PANEL: CPT

## 2021-11-04 PROCEDURE — 3600000004 HC SURGERY LEVEL 4 BASE: Performed by: ORTHOPAEDIC SURGERY

## 2021-11-04 PROCEDURE — 7100000001 HC PACU RECOVERY - ADDTL 15 MIN: Performed by: ORTHOPAEDIC SURGERY

## 2021-11-04 PROCEDURE — 85520 HEPARIN ASSAY: CPT

## 2021-11-04 PROCEDURE — 6370000000 HC RX 637 (ALT 250 FOR IP): Performed by: ORTHOPAEDIC SURGERY

## 2021-11-04 PROCEDURE — 88311 DECALCIFY TISSUE: CPT

## 2021-11-04 PROCEDURE — 3700000000 HC ANESTHESIA ATTENDED CARE: Performed by: ORTHOPAEDIC SURGERY

## 2021-11-04 PROCEDURE — 0L980ZZ DRAINAGE OF LEFT HAND TENDON, OPEN APPROACH: ICD-10-PCS | Performed by: ORTHOPAEDIC SURGERY

## 2021-11-04 PROCEDURE — 87205 SMEAR GRAM STAIN: CPT

## 2021-11-04 PROCEDURE — 2709999900 HC NON-CHARGEABLE SUPPLY: Performed by: ORTHOPAEDIC SURGERY

## 2021-11-04 PROCEDURE — 83735 ASSAY OF MAGNESIUM: CPT

## 2021-11-04 PROCEDURE — 80202 ASSAY OF VANCOMYCIN: CPT

## 2021-11-04 PROCEDURE — 6360000002 HC RX W HCPCS: Performed by: NURSE ANESTHETIST, CERTIFIED REGISTERED

## 2021-11-04 PROCEDURE — 87206 SMEAR FLUORESCENT/ACID STAI: CPT

## 2021-11-04 PROCEDURE — 87102 FUNGUS ISOLATION CULTURE: CPT

## 2021-11-04 PROCEDURE — 88305 TISSUE EXAM BY PATHOLOGIST: CPT

## 2021-11-04 PROCEDURE — 87077 CULTURE AEROBIC IDENTIFY: CPT

## 2021-11-04 PROCEDURE — 2580000003 HC RX 258: Performed by: NURSE ANESTHETIST, CERTIFIED REGISTERED

## 2021-11-04 PROCEDURE — 87116 MYCOBACTERIA CULTURE: CPT

## 2021-11-04 PROCEDURE — 0X6T0Z3 DETACHMENT AT LEFT RING FINGER, LOW, OPEN APPROACH: ICD-10-PCS | Performed by: ORTHOPAEDIC SURGERY

## 2021-11-04 PROCEDURE — 1200000000 HC SEMI PRIVATE

## 2021-11-04 PROCEDURE — 87015 SPECIMEN INFECT AGNT CONCNTJ: CPT

## 2021-11-04 PROCEDURE — 85025 COMPLETE CBC W/AUTO DIFF WBC: CPT

## 2021-11-04 PROCEDURE — 3700000001 HC ADD 15 MINUTES (ANESTHESIA): Performed by: ORTHOPAEDIC SURGERY

## 2021-11-04 PROCEDURE — 87186 SC STD MICRODIL/AGAR DIL: CPT

## 2021-11-04 RX ORDER — ONDANSETRON 2 MG/ML
4 INJECTION INTRAMUSCULAR; INTRAVENOUS
Status: DISCONTINUED | OUTPATIENT
Start: 2021-11-04 | End: 2021-11-04 | Stop reason: HOSPADM

## 2021-11-04 RX ORDER — PROPOFOL 10 MG/ML
INJECTION, EMULSION INTRAVENOUS PRN
Status: DISCONTINUED | OUTPATIENT
Start: 2021-11-04 | End: 2021-11-04 | Stop reason: SDUPTHER

## 2021-11-04 RX ORDER — PROPOFOL 10 MG/ML
INJECTION, EMULSION INTRAVENOUS PRN
Status: DISCONTINUED | OUTPATIENT
Start: 2021-11-04 | End: 2021-11-04

## 2021-11-04 RX ORDER — SODIUM CHLORIDE 9 MG/ML
INJECTION, SOLUTION INTRAVENOUS CONTINUOUS PRN
Status: DISCONTINUED | OUTPATIENT
Start: 2021-11-04 | End: 2021-11-04 | Stop reason: SDUPTHER

## 2021-11-04 RX ORDER — FENTANYL CITRATE 50 UG/ML
25 INJECTION, SOLUTION INTRAMUSCULAR; INTRAVENOUS EVERY 5 MIN PRN
Status: DISCONTINUED | OUTPATIENT
Start: 2021-11-04 | End: 2021-11-04 | Stop reason: HOSPADM

## 2021-11-04 RX ORDER — FENTANYL CITRATE 50 UG/ML
INJECTION, SOLUTION INTRAMUSCULAR; INTRAVENOUS PRN
Status: DISCONTINUED | OUTPATIENT
Start: 2021-11-04 | End: 2021-11-04 | Stop reason: SDUPTHER

## 2021-11-04 RX ORDER — PROPOFOL 10 MG/ML
INJECTION, EMULSION INTRAVENOUS CONTINUOUS PRN
Status: DISCONTINUED | OUTPATIENT
Start: 2021-11-04 | End: 2021-11-04 | Stop reason: SDUPTHER

## 2021-11-04 RX ORDER — BUPIVACAINE HYDROCHLORIDE 2.5 MG/ML
INJECTION, SOLUTION EPIDURAL; INFILTRATION; INTRACAUDAL PRN
Status: DISCONTINUED | OUTPATIENT
Start: 2021-11-04 | End: 2021-11-04 | Stop reason: ALTCHOICE

## 2021-11-04 RX ORDER — LIDOCAINE HYDROCHLORIDE 10 MG/ML
INJECTION, SOLUTION EPIDURAL; INFILTRATION; INTRACAUDAL; PERINEURAL PRN
Status: DISCONTINUED | OUTPATIENT
Start: 2021-11-04 | End: 2021-11-04 | Stop reason: ALTCHOICE

## 2021-11-04 RX ADMIN — TORSEMIDE 20 MG: 20 TABLET ORAL at 08:15

## 2021-11-04 RX ADMIN — HYDROMORPHONE HYDROCHLORIDE 0.5 MG: 1 INJECTION, SOLUTION INTRAMUSCULAR; INTRAVENOUS; SUBCUTANEOUS at 21:37

## 2021-11-04 RX ADMIN — FENTANYL CITRATE 25 MCG: 50 INJECTION, SOLUTION INTRAMUSCULAR; INTRAVENOUS at 17:54

## 2021-11-04 RX ADMIN — CEFEPIME HYDROCHLORIDE 1000 MG: 1 INJECTION, POWDER, FOR SOLUTION INTRAMUSCULAR; INTRAVENOUS at 11:44

## 2021-11-04 RX ADMIN — PROPOFOL 40 MG: 10 INJECTION, EMULSION INTRAVENOUS at 17:44

## 2021-11-04 RX ADMIN — SODIUM CHLORIDE: 9 INJECTION, SOLUTION INTRAVENOUS at 00:39

## 2021-11-04 RX ADMIN — FENTANYL CITRATE 25 MCG: 50 INJECTION, SOLUTION INTRAMUSCULAR; INTRAVENOUS at 18:01

## 2021-11-04 RX ADMIN — PANTOPRAZOLE SODIUM 40 MG: 40 TABLET, DELAYED RELEASE ORAL at 06:28

## 2021-11-04 RX ADMIN — PROPOFOL 40 MG: 10 INJECTION, EMULSION INTRAVENOUS at 17:49

## 2021-11-04 RX ADMIN — SERTRALINE HYDROCHLORIDE 50 MG: 50 TABLET ORAL at 08:15

## 2021-11-04 RX ADMIN — PROPOFOL 50 MCG/KG/MIN: 10 INJECTION, EMULSION INTRAVENOUS at 17:44

## 2021-11-04 RX ADMIN — PROPOFOL 20 MG: 10 INJECTION, EMULSION INTRAVENOUS at 17:54

## 2021-11-04 RX ADMIN — FENTANYL CITRATE 25 MCG: 50 INJECTION, SOLUTION INTRAMUSCULAR; INTRAVENOUS at 18:12

## 2021-11-04 RX ADMIN — TORSEMIDE 20 MG: 20 TABLET ORAL at 21:27

## 2021-11-04 RX ADMIN — FENTANYL CITRATE 25 MCG: 50 INJECTION, SOLUTION INTRAMUSCULAR; INTRAVENOUS at 18:27

## 2021-11-04 RX ADMIN — SODIUM CHLORIDE, PRESERVATIVE FREE 10 ML: 5 INJECTION INTRAVENOUS at 21:27

## 2021-11-04 RX ADMIN — AMIODARONE HYDROCHLORIDE 200 MG: 200 TABLET ORAL at 21:27

## 2021-11-04 RX ADMIN — SODIUM CHLORIDE: 9 INJECTION, SOLUTION INTRAVENOUS at 17:33

## 2021-11-04 ASSESSMENT — PULMONARY FUNCTION TESTS
PIF_VALUE: 1
PIF_VALUE: 2
PIF_VALUE: 1
PIF_VALUE: 2
PIF_VALUE: 1
PIF_VALUE: 3
PIF_VALUE: 1
PIF_VALUE: 0
PIF_VALUE: 1
PIF_VALUE: 2
PIF_VALUE: 1
PIF_VALUE: 0
PIF_VALUE: 1
PIF_VALUE: 0
PIF_VALUE: 1
PIF_VALUE: 1
PIF_VALUE: 0
PIF_VALUE: 1
PIF_VALUE: 2
PIF_VALUE: 1
PIF_VALUE: 3
PIF_VALUE: 1
PIF_VALUE: 0
PIF_VALUE: 1
PIF_VALUE: 1

## 2021-11-04 ASSESSMENT — ENCOUNTER SYMPTOMS
ABDOMINAL PAIN: 0
ABDOMINAL DISTENTION: 0
SHORTNESS OF BREATH: 0
DIARRHEA: 0
NAUSEA: 0
CONSTIPATION: 0
COUGH: 0
VOMITING: 0

## 2021-11-04 ASSESSMENT — PAIN SCALES - GENERAL
PAINLEVEL_OUTOF10: 0
PAINLEVEL_OUTOF10: 8
PAINLEVEL_OUTOF10: 8
PAINLEVEL_OUTOF10: 0
PAINLEVEL_OUTOF10: 0

## 2021-11-04 ASSESSMENT — PAIN DESCRIPTION - PAIN TYPE: TYPE: ACUTE PAIN;SURGICAL PAIN

## 2021-11-04 ASSESSMENT — PAIN DESCRIPTION - FREQUENCY: FREQUENCY: CONTINUOUS

## 2021-11-04 ASSESSMENT — PAIN DESCRIPTION - LOCATION: LOCATION: FINGER (COMMENT WHICH ONE)

## 2021-11-04 ASSESSMENT — PAIN DESCRIPTION - PROGRESSION: CLINICAL_PROGRESSION: NOT CHANGED

## 2021-11-04 ASSESSMENT — PAIN DESCRIPTION - ORIENTATION: ORIENTATION: LEFT

## 2021-11-04 ASSESSMENT — PAIN DESCRIPTION - DESCRIPTORS: DESCRIPTORS: ACHING

## 2021-11-04 ASSESSMENT — PAIN DESCRIPTION - ONSET: ONSET: ON-GOING

## 2021-11-04 ASSESSMENT — PAIN - FUNCTIONAL ASSESSMENT: PAIN_FUNCTIONAL_ASSESSMENT: ACTIVITIES ARE NOT PREVENTED

## 2021-11-04 NOTE — PROGRESS NOTES
Nephrology Consult Note        [facility]    [employer]       Phone: [phone]                                                  11/4/2021 9:16 AM     Patient: Quin Mcduffie. [id number]  [id number]  Date of Admit: 11/2/2021 LOS: 2 days    Interval History and Plan:  Patient feel fine and tolerated HD well yesterday. HD per MWF schedule while patient is here  Hb stable. No need of HARSHAD  Monitor Phos while here. No phosphate binder while inpatient. Plan for OR today for hand Osteomyelitis. Avoid nephrotoxins  Monitor input, output and daily weight  Renally dose all medications          Thank you for allowing us to participate in this patient's care    In case of any question please call us at our 24 hour answering service [phone] or from 7 AM to 5 PM via [employer] or cell number    Param Grant MD  [facility]  [facility] [address]  [place], [address]  Fax: [phone]  Office: [phone]       Assessment & Plan     Renal function:  ESRD  iHD MWF  AVF    Electrolytes:  Lab Results   Component Value Date    CREATININE 4.9 (H) 11/04/2021    BUN 18 11/04/2021     (L) 11/04/2021    K 4.7 11/04/2021    CL 95 (L) 11/04/2021    CO2 27 11/04/2021            # CKD MBD  No PTH available in system. Monitor Phos level  Ca level OK    Lab Results   Component Value Date    CALCIUM 8.9 11/04/2021    PHOS 5.3 (H) 11/04/2021         Acid/Base status:  Stable. Volume status/BP:  BP stable. No acute volume overload concerns. On room air.      Intake/Output Summary (Last 24 hours) at 11/4/2021 0916  Last data filed at 11/3/2021 2200  Gross per 24 hour   Intake 120 ml   Output 1400 ml   Net -1280 ml         Hematology:   Lab Results   Component Value Date    IRON 74 08/10/2016    TIBC 202 (L) 08/10/2016     Lab Results   Component Value Date    WBC 4.6 11/04/2021    HGB 13.0 (L) 11/04/2021    HCT 40.0 (L) 11/04/2021    .2 (H) 11/04/2021     11/04/2021           Reason for Consult and Chief Complaint     Reason for consult: ESRD on HD    Chief complaint:   Chief Complaint   Patient presents with    Hand Pain     pt c/o a swollen 4th digit and hand on the left hand x 4 weeks and getting worse. Two dyas ago became black and more swollen after antibiotics. History of Present Illness   Devora Ruiz is a 77 y.o. with PMH significant for ESRD on HD MWF, HTN, HFrEF, and Afib admitted overnight with pain and swelling of left hand finger/hand since last one week. Imaging showed concern for osteomyelitis and he is on Antibiotics. Patient otherwise denies SOB, chest pain, GI symptoms. Last dialysis was on Monday as per his schedule and no issues with AVF    Review of Systems     GEN: No recent fever, chills or night sweats  HEENT: No changes in vision, sore throat, rhinorrhea   CVS: No cp, palpitations or swelling in legs  Pulmonary: No cough or hemoptysis or SOB  GI: No nausea, vomiting, diarrhea, constipation or abdominal pain  : No bowel or bladder incontinence, no dysuria, no hematuria. MSK:  Left hand/finger pain. Skin: No rashes. CNS: No headache, dizziness, confusion or focal weakness. No seizure. Psych: No anxiety, agitation or depression. Reviewed all 12 systems, negative except as above. Past Medical History     Past Medical History:   Diagnosis Date    Cardiomyopathy [facility])     Cerebrovascular disease     [facility]congestive heart failure) ([facility] [address]     Dialysis patient [facility])     M-W-[facility] Encounter for imaging to screen for metal prior to MRI 11/04/2021    NOT MRI Conditional [facility] ICD Model#[id number] Serial# [id number] implanted 1/22/14 Lead: RV 1580/65 implanted 8/23/06 at [facility]. Patients ICD system is NOT approved for MRI(battery and lead).  Patient is unable to have any type of MRI.    H/O medication noncompliance     HTN (hypertension) 08/16/2011    Other and unspecified hyperlipidemia 08/16/2011    Pacemaker     Presence of implantable cardioverter-defibrillator (ICD)     Sleep disorder     Tachycardia 08/16/2011    TIA (transient ischemic attack) 08/16/2011         Past Surgical History     Past Surgical History:   Procedure Laterality Date    CARDIAC DEFIBRILLATOR PLACEMENT  2013    [facility]    DIALYSIS FISTULA CREATION Left 3/4/2021    LEFT BACHIAL CEPHALIC FISTULA CREATION performed by Julian Calderon MD at [facility] Bilateral [id number]    PACEMAKER PLACEMENT  2013         Family History     Family History   Problem Relation Age of Onset   Karla Spurling Osteoarthritis Father     Hypertension Father     Cancer Father         prostate, rectal    Stroke Father     ADHD Sister          Social History     Social History     Tobacco Use    Smoking status: Current Every Day Smoker     Packs/day: 0.25     Years: 30.00     Pack years: 7.50     Types: Cigarettes     Last attempt to quit: 10/13/2018     Years since quitting: 3.0    Smokeless tobacco: Never Used    Tobacco comment: pt has cut down   Substance Use Topics    Alcohol use: No     Alcohol/week: 0.0 standard drinks          Past medical, family, and social histories were reviewed as previously documented. Updates were made as necessary. Inpatient Medications and Allergies       Scheduled Meds:   amiodarone  200 mg Oral Nightly    pantoprazole  40 mg Oral QAM AC    sertraline  50 mg Oral Daily    torsemide  20 mg Oral BID    cefepime  1,000 mg IntraVENous Q24H    heparin (porcine)  60 Units/kg IntraVENous Once    sodium chloride flush  5-40 mL IntraVENous 2 times per day    vancomycin (VANCOCIN) intermittent dosing (placeholder)   Other See Admin Instructions       Allergies:    Allergies   Allergen Reactions    Corn-Containing Products      Other reaction(s): GI Upset  Only corn kernel         Vital Signs     Vitals:    11/04/21 0629   BP: 126/86   Pulse: 86   Resp: 16   Temp: 97.8 °F (36.6 °C)   SpO2: 94%         Intake/Output Summary (Last 24 hours) at 11/4/2021 0916  Last data filed at 11/3/2021 2200  Gross per 24 hour   Intake 120 ml   Output 1400 ml   Net -1280 ml         Physical Exam     General appearance: NAD  Head: Normocephalic, without obvious abnormality, atraumatic   Mouth: Moist mucous membrane  Neck: Supple. Lungs: Clear to auscultation bilaterally and no respiratory distress on RA  Heart: S1, S2.  Abdomen: soft, non-tender non-distended  Extremities: No leg edema, warm to touch. Right hand/fingers swollen  Skin: No concerning rashes noted  Psych: good eye contact, normal affect  Neuro: AAO x 3  AVF on left arm, Dressed.      Laboratory Data     Please see above     Diagnostic Studies   Pertinent images reviewed

## 2021-11-04 NOTE — ANESTHESIA PRE PROCEDURE
Department of Anesthesiology  Preprocedure Note       Name:  Sean Ramirez. Age:  77 y.o.  :  1945                                          MRN:  [id number]         Date:  2021      Surgeon: Lord Singh):  Moustapha Han MD    Procedure: Procedure(s):  DEBRIDEMENT LEFT RING FINGER, POSSIBLE AMPUTATION LEFT RING FINGER PIP JOINT    Medications prior to admission:   Prior to Admission medications    Medication Sig Start Date End Date Taking? Authorizing Provider   midodrine (PROAMATINE) 5 MG tablet Take 5 mg by mouth 3 times daily (with meals)   Yes Historical Provider, MD   HYDROcodone-acetaminophen (NORCO) 5-325 MG per tablet Take 1 tablet by mouth 2 times daily as needed for Pain for up to 30 days. 10/7/21 11/6/21 Yes Valentin Gardiner MD   apixaban (ELIQUIS) 2.5 MG TABS tablet Take 2.5 mg by mouth 2 times daily    Yes Historical Provider, MD   pantoprazole (PROTONIX) 40 MG tablet Take 1 tablet by mouth 10/14/20   Historical Provider, MD   sertraline (ZOLOFT) 50 MG tablet TAKE ONE TABLET BY MOUTH DAILY.  DISCONTINUE FLUOXETINE 3/15/21   Rose Marie Fritz MD   Ferric Citrate 1  MG(Fe) TABS Take by mouth 2 times daily    Historical Provider, MD   Multiple Vitamins-Minerals (RENAPLEX-D) TABS Take by mouth daily    Historical Provider, MD   amiodarone (CORDARONE) 200 MG tablet Take 1 tablet by mouth daily  Patient taking differently: Take 200 mg by mouth nightly  20   Rose Marie Fritz MD   torsemide (DEMADEX) 20 MG tablet Take 1 tablet by mouth See Admin Instructions 2 pills daily at 6 am and one daily at noon  Patient taking differently: Take 20 mg by mouth See Admin Instructions 2 pills daily at 6 am and one daily at noon - not taking 5/15/20   Valentin Gardiner MD   allopurinol (ZYLOPRIM) 100 MG tablet Take 1 tablet by mouth daily 19   Madison Ramirez MD       Current medications:    Current Facility-Administered Medications   Medication Dose Route Frequency Provider Last Rate Last Admin    HYDROmorphone (DILAUDID) injection 0.5 mg  0.5 mg IntraVENous Q6H PRN Katlin Arechiga MD        [START ON 11/5/2021] cefepime (MAXIPIME) 2000 mg IVPB minibag  2,000 mg IntraVENous Q MWF Marga Soto MD        amiodarone (CORDARONE) tablet 200 mg  200 mg Oral Nightly Moncho Batista MD   200 mg at 11/03/21 2157    pantoprazole (PROTONIX) tablet 40 mg  40 mg Oral QAM AC Moncho Batista MD   40 mg at 11/04/21 0599    sertraline (ZOLOFT) tablet 50 mg  50 mg Oral Daily Moncho Batista MD   50 mg at 11/04/21 0815    torsemide (DEMADEX) tablet 20 mg  20 mg Oral BID Katlin Arechiga MD   20 mg at 11/04/21 0815    sodium chloride flush 0.9 % injection 5-40 mL  5-40 mL IntraVENous 2 times per day Moncho Batista MD   10 mL at 11/03/21 2157    sodium chloride flush 0.9 % injection 5-40 mL  5-40 mL IntraVENous PRN Moncho Batista MD        0.9 % sodium chloride infusion  25 mL IntraVENous PRN Moncho Batista MD        ondansetron (ZOFRAN-ODT) disintegrating tablet 4 mg  4 mg Oral Q8H PRN Moncho Batista MD        Or    ondansetron [facility]) injection 4 mg  4 mg IntraVENous Q6H PRN Moncho Batista MD        polyethylene glycol (GLYCOLAX) packet 17 g  17 g Oral Daily PRN Moncho Batista MD        acetaminophen (TYLENOL) tablet 650 mg  650 mg Oral Q6H PRN Moncho Batista MD        Or   [facility] acetaminophen (TYLENOL) suppository 650 mg  650 mg Rectal Q6H PRN Moncho Batista MD        vancomycin Steffen Reece) intermittent dosing (placeholder)   Other See Admin Instructions Moncho Batista MD           Allergies:     Allergies   Allergen Reactions    Corn-Containing Products      Other reaction(s): GI Upset  Only corn kernel       Problem List:    Patient Active Problem List   Diagnosis Code    Cardiomyopathy ([facility] I42.9    Tachycardia R00.0    TIA (transient ischemic attack) G45.9    Essential hypertension I10    Chronic left systolic heart failure (HCC) I50.22    Iron deficiency anemia due to chronic blood loss D50.0    Sleep disorder G47.9    ESRD (end stage renal disease) (HCC) N18.6    Mixed hyperlipidemia E78.2    Moderate episode of recurrent major depressive disorder (HCC) F33.1    Paroxysmal ventricular tachycardia (HCC) I47.2    Chronic kidney disease (CKD), stage IV (severe) (HCC) N18.4    ESRD on dialysis ([facility] N18.6, Z99.2    AVINASH (acute kidney injury) ([facility] N17.9    Allergy, unspecified, initial encounter T78.40XA    Anaphylactic shock, unspecified, initial encounter T78. 2XXA    Arthralgia of knee, left M25.562    Atrial fibrillation (HCC) I48.91    Automatic implantable cardioverter-defibrillator in situ Z95.810    Chronic ischemic heart disease I25.9    Coagulation defect, unspecified (HCC) D68.9    Congestive heart failure (HCC) I50.9    Current use of long term anticoagulation Z79.01    Dependence on renal dialysis ([facility] [facility] Z99.2    DNR (do not resuscitate) Z66    Encounter for fitting and adjustment of extracorporeal dialysis catheter ([facility] Z49.01    Goals of care, counseling/discussion Z71.89    Palliative care encounter Z51.5    Encounter for servicing of automatic implantable cardioverter-defibrillator (AICD) at end of battery life Z45.02    GERD (gastroesophageal reflux disease) K21.9    High risk medication use Z79.899    History of TIA (transient ischemic attack) Z86.73    History of ventricular tachycardia Z86.79    Osteomyelitis ([facility]) M86.9       Past Medical History:        Diagnosis Date    Cardiomyopathy ([facility]     Cerebrovascular disease     CHF (congestive heart failure) ([facility]     Dialysis patient [facility])     [facility]    Encounter for imaging to screen for metal prior to MRI 11/04/2021    NOT MRI Conditional [facility] ICD Model#[id number] Serial# [id number] implanted 1/22/14 Lead: RV 1580/65 implanted 8/23/06 at [facility]. Patients ICD system is NOT approved for MRI(battery and lead).  Patient is unable to have any type of MRI.    H/O medication noncompliance     HTN (hypertension) 08/16/2011    Other and unspecified hyperlipidemia 08/16/2011    Pacemaker     Presence of implantable cardioverter-defibrillator (ICD)     Sleep disorder     Tachycardia 08/16/2011    TIA (transient ischemic attack) 08/16/2011       Past Surgical History:        Procedure Laterality Date    CARDIAC DEFIBRILLATOR PLACEMENT  2013    [facility]    DIALYSIS FISTULA CREATION Left 3/4/2021    LEFT BACHIAL CEPHALIC FISTULA CREATION performed by Socorro Soler MD at [address] [place] Bilateral [phone]    PACEMAKER PLACEMENT  2013       Social History:    Social History     Tobacco Use    Smoking status: Current Every Day Smoker     Packs/day: 0.25     Years: 30.00     Pack years: 7.50     Types: Cigarettes     Last attempt to quit: 10/13/2018     Years since quitting: 3.0    Smokeless tobacco: Never Used    Tobacco comment: pt has cut down   Substance Use Topics    Alcohol use: No     Alcohol/week: 0.0 standard drinks                                Ready to quit: Not Answered  Counseling given: Not Answered  Comment: pt has cut down      Vital Signs (Current):   Vitals:    11/03/21 2315 11/04/21 0309 11/04/21 0629 11/04/21 1143   BP: 121/84 125/86 126/86 119/80   Pulse: 82 86 86 81   Resp: 18 18 16 18   Temp: 98.2 °F (36.8 °C) 97.5 °F (36.4 °C) 97.8 °F (36.6 °C) 97.8 °F (36.6 °C)   TempSrc: Oral Oral Oral Oral   SpO2: 94% 93% 94% 94%   Weight:       Height:                                                  BP Readings from Last 3 Encounters:   11/04/21 119/80   11/04/21 124/82   10/21/21 138/70       NPO Status:                                                                                 BMI:   Wt Readings from Last 3 Encounters:   11/03/21 130 lb 15.3 oz (59.4 kg)   10/21/21 144 lb 9.6 oz (65.6 kg)   10/07/21 145 lb 9.6 oz (66 kg)     Body mass index is 21.14 kg/m².     CBC:   Lab Results   Component Value Date    WBC 4.6 11/04/2021    RBC 3.77 11/04/2021    HGB 13.0 11/04/2021    HCT 40.0 11/04/2021    .2 11/04/2021    RDW 17.0 11/04/2021     11/04/2021       CMP:   Lab Results   Component Value Date     11/04/2021    K 4.7 11/04/2021    K 4.5 11/02/2021    CL 95 11/04/2021    CO2 27 11/04/2021    BUN 18 11/04/2021    CREATININE 4.9 11/04/2021    GFRAA 14 11/04/2021    AGRATIO 1.2 12/03/2019    LABGLOM 12 11/04/2021    GLUCOSE 84 11/04/2021    PROT 5.6 12/03/2019    CALCIUM 8.9 11/04/2021    BILITOT 1.5 12/03/2019    ALKPHOS 103 12/03/2019    AST 14 12/03/2019    ALT 10 12/03/2019       POC Tests: No results for input(s): POCGLU, POCNA, POCK, POCCL, POCBUN, POCHEMO, POCHCT in the last 72 hours. Coags:   Lab Results   Component Value Date    PROTIME 13.9 11/03/2021    INR 1.22 11/03/2021    APTT 38.0 11/03/2021       HCG (If Applicable): No results found for: PREGTESTUR, PREGSERUM, HCG, HCGQUANT     ABGs: No results found for: PHART, PO2ART, WQB7MIL, MZI7WAY, BEART, H5GETGEE     Type & Screen (If Applicable):  No results found for: LABABO, LABRH    Drug/Infectious Status (If Applicable):  No results found for: HIV, HEPCAB    COVID-19 Screening (If Applicable):   Lab Results   Component Value Date    COVID19 Not Detected 02/26/2021    COVID19 NOT DETECTED 02/12/2021           Anesthesia Evaluation  Patient summary reviewed and Nursing notes reviewed  Airway: Mallampati: II  TM distance: >3 FB   Neck ROM: full  Mouth opening: > = 3 FB Dental:    (+) lower dentures and upper dentures      Pulmonary:Negative Pulmonary ROS and normal exam  breath sounds clear to auscultation            Patient did not smoke on day of surgery.                  Cardiovascular:    (+) hypertension: mild, pacemaker: AICD, CHF: no interval change,       ECG reviewed  Rhythm: regular  Rate: normal  Echocardiogram reviewed         Beta Blocker:  Dose within 24 Hrs         Neuro/Psych:   (+) TIA, psychiatric history: stable with treatment            GI/Hepatic/Renal:   (+) GERD: well controlled, renal disease: ESRD and dialysis,           Endo/Other: Negative Endo/Other ROS                    Abdominal:       Abdomen: soft. Vascular: negative vascular ROS. Other Findings:             Anesthesia Plan      MAC     ASA 4       Induction: intravenous. MIPS: Postoperative opioids intended and Prophylactic antiemetics administered. Anesthetic plan and risks discussed with patient. Use of blood products discussed with patient whom consented to blood products. Plan discussed with attending and CRNA.     Attending anesthesiologist reviewed and agrees with Preprocedure content              Gina Brian DO   11/4/2021

## 2021-11-04 NOTE — PROGRESS NOTES
Progress Note    Admit Date: 11/2/2021  Diet: Diet NPO    CC: Left ring finger w/ swelling and pain    Interval history:  -NAEO  -Afeb, HDS, on RA  -Patient seen by Orthopedics yesterday, tentative surgery today  -Patient endorsing mild increase in pain, increased pain regimen      Medications:     Scheduled Meds:   amiodarone  200 mg Oral Nightly    pantoprazole  40 mg Oral QAM AC    sertraline  50 mg Oral Daily    torsemide  20 mg Oral BID    cefepime  1,000 mg IntraVENous Q24H    sodium chloride flush  5-40 mL IntraVENous 2 times per day    vancomycin (VANCOCIN) intermittent dosing (placeholder)   Other See Admin Instructions     Continuous Infusions:   sodium chloride       PRN Meds:HYDROmorphone, sodium chloride flush, sodium chloride, ondansetron **OR** ondansetron, polyethylene glycol, acetaminophen **OR** acetaminophen    Objective:   Vitals:   T-max:  Patient Vitals for the past 8 hrs:   BP Temp Temp src Pulse Resp SpO2   11/04/21 0629 126/86 97.8 °F (36.6 °C) Oral 86 16 94 %   11/04/21 0309 125/86 97.5 °F (36.4 °C) Oral 86 18 93 %       Intake/Output Summary (Last 24 hours) at 11/4/2021 1026  Last data filed at 11/3/2021 2200  Gross per 24 hour   Intake 120 ml   Output 1400 ml   Net -1280 ml       Review of Systems   Constitutional: Negative for chills, diaphoresis, fatigue and fever. HENT: Negative for congestion. Eyes: Negative for visual disturbance. Respiratory: Negative for cough and shortness of breath. Cardiovascular: Negative for chest pain, palpitations and leg swelling. Gastrointestinal: Negative for abdominal distention, abdominal pain, constipation, diarrhea, nausea and vomiting. Endocrine: Negative for polyuria. Genitourinary: Negative for decreased urine volume, difficulty urinating, dysuria, frequency, hematuria and urgency. Musculoskeletal: Positive for joint swelling (Left Finger). Skin: Positive for wound (Small wound on nailbed, non-purulent).    Neurological: Negative for dizziness, syncope, weakness, light-headedness, numbness and headaches. Physical Exam  Constitutional:       General: He is not in acute distress. Appearance: Normal appearance. He is normal weight. He is not ill-appearing. HENT:      Head: Normocephalic and atraumatic. Right Ear: External ear normal.      Left Ear: External ear normal.      Nose: Nose normal.      Mouth/Throat:      Mouth: Mucous membranes are moist.      Pharynx: Oropharynx is clear. Eyes:      Extraocular Movements: Extraocular movements intact. Conjunctiva/sclera: Conjunctivae normal.      Pupils: Pupils are equal, round, and reactive to light. Cardiovascular:      Rate and Rhythm: Normal rate and regular rhythm. Heart sounds: Normal heart sounds. No murmur heard. No friction rub. No gallop. Pulmonary:      Effort: Pulmonary effort is normal. No respiratory distress. Breath sounds: Normal breath sounds. No wheezing, rhonchi or rales. Abdominal:      General: Abdomen is flat. There is no distension. Palpations: Abdomen is soft. Tenderness: There is no abdominal tenderness. There is no guarding or rebound. Musculoskeletal:         General: Swelling, tenderness and signs of injury present. Cervical back: Normal range of motion and neck supple. Right lower leg: No edema. Left lower leg: No edema. Comments: Left ring finger w/ fusiform swelling and decreased ROM. No purulence, small lesion adjacent to nailbed. Skin:     General: Skin is warm and dry. Capillary Refill: Capillary refill takes less than 2 seconds. Findings: No lesion. Neurological:      General: No focal deficit present. Mental Status: He is alert and oriented to person, place, and time. Psychiatric:         Mood and Affect: Mood normal.         Thought Content:  Thought content normal.       LABS:    CBC:   Recent Labs     11/03/21  0417 11/03/21  1630 11/04/21  0510   WBC 5.2 4.0 4. 6   HGB 13.5 12.9* 13.0*   HCT 40.9 38.2* 40.0*    185 182   .1* 103.9* 106.2*     Renal:    Recent Labs     11/02/21  1911 11/03/21 0417 11/04/21  0510    132* 135*   K 4.5 4.6 4.7   CL 95* 93* 95*   CO2 31 26 27   BUN 18 21* 18   CREATININE 5.7* 5.8* 4.9*   GLUCOSE 89 89 84   CALCIUM 9.3 9.4 8.9   MG  --  2.50* 2.20   PHOS  --  5.4* 5.3*   ANIONGAP 11 13 13     Hepatic:   Recent Labs     11/03/21 0417 11/04/21  0510   LABALBU 3.7 3.7     INR:   Recent Labs     11/03/21 0417 11/03/21  1630   INR 1.25* 1.22*     Lactate: No results for input(s): LACTATE in the last 72 hours. Cultures:  -----------------------------------------------------------------  RAD:   XR HAND LEFT (MIN 3 VIEWS)   Final Result      1. Abnormal lytic change affecting the distal phalanx of the fourth finger with overlying soft tissue swelling and ostial lysis consistent most likely with osteomyelitis of the finger with advanced lytic distal phalanx bone destruction      2. Advanced arthritic change affecting the base of the thumb at the first carpal-metacarpal articulation and advanced joint space loss at the first and second metacarpal phalangeal joint with subchondral erosions of the first and second metacarpal head                Assessment/Plan:   77year old male with am extensive past medical history significant for ESRD on HD, HTN, HFrEF, and Afib who presents w/ a swollen and painful left ring finger. Osteomyelitis of the left 4th finger, distal phalange   Prior trauma of the finger after cutting his nail about 2 weeks ago. Significant edema and tenderness. Pt completed course of Augmentin as outpt   XR of the left hand showed OM of the finger, Elevated ESR and CRP  -BCx2 NGTD  -Abx: Vanc and Cefepime, renal dosing per pharm  -Ortho C/s'd for surgical mgmt, OR today per discussion w/ patient  -NPO since MN, Heparin gtt d/c'd at 4 AM for clearance  -Pain control: Dilaudid . 5 Q6H    Macrocytic anemia   H/H

## 2021-11-04 NOTE — BRIEF OP NOTE
Brief Postoperative Note      Patient: Meghan Christine. YOB: 1945  MRN: [id number]    Date of Procedure: 11/4/2021    Pre-Op Diagnosis: LEFT RING FINGER INFECTION/OSTEOMYELITIS    Post-Op Diagnosis: Same       1. Revision amputation of left ring finger at level of DIP joint including neurectomies and primary closure  2.  Incision and drainage of flexor tendon sheath left ring finger    Surgeon(s):  Jalen Lopez MD    Assistant:  Surgical Assistant: Ten Herring    Anesthesia: MAC, local    Estimated Blood Loss (mL): 04WK    Complications: None immediate apparent    Specimens:   ID Type Source Tests Collected by Time Destination   1 : left ring finger abscess wound  Specimen Finger CULTURE, FUNGUS, CULTURE, SURGICAL, CULTURE WITH SMEAR, ACID FAST Jerie Burkitt, MD 11/4/2021 1753    2 : distal phalynx  Specimen Finger CULTURE, FUNGUS, CULTURE, SURGICAL, CULTURE WITH SMEAR, ACID FAST BACILLIUS, CULTURE, ANAEROBIC AND AEROBIC Jalen Lopez MD 11/4/2021 1755    A : left ring finger partial amputation Tissue Finger SURGICAL PATHOLOGY Jalen Lopez MD 11/4/2021 1805        Implants:  none    Drains: 1/4 inch nugauze packing left palm    Findings: see fully dictated operative report    Electronically signed by Carlos Argueta MD on 11/4/2021 at 7:03 PM

## 2021-11-04 NOTE — PROGRESS NOTES
Patient taken to surgery at this time. Wife will wait in room. Patient had pulled IV out prior to going for surgery. OR nurse made aware. Patient was walked to the bathroom prior to surgery, stand by assist with cane. No concerns voiced.

## 2021-11-04 NOTE — PROGRESS NOTES
Patient has [facility] ICD that is NOT MRI conditional.     Patient is unable to have any type of MRI.      GARETH Milton made aware and MRI to cancel exam.

## 2021-11-04 NOTE — CONSULTS
Attending : Sukh Johnston MD  Consult Note        Reason for Consult:  Left ring finger swelling and pain   Requesting Physician: Eladio Bertrand MD  Date of Service: 11/4/2021 5:00 PM    CHIEF COMPLAINT:  As Above    History Obtained From:  patient    HISTORY OF PRESENT ILLNESS:                The patient is a 77 y.o. male who presents with above chief complaint. RHD, PMH extensive past medical history significant for ESRD on HD, HTN, HFrEF, and Afib   He presented to ED 11/2/2021 with left ring finger pain and swelling mainly near the tip of the finger now for approximately 2.5-3 weeks. He states that symptoms started with a cut near his nail sulcus while cutting his fingernail several week ago. He has not noticed any obvious recent drainage but has had persistent and increasing swelling and pain in the finger pulp as well as started to have some swelling now more proximally   He was started on oral antibiotic course on 10/21/2021 by PCP but failed outpatient antibiotic treatment, thus prompting ED visit  He was admitted and started on empiric IV antibiotics, my partner Dr. Harrison Cain has asked me to evaluate the patient for hand subspecialist evaluation  Currently the patient reports sensitivity and pain at fingertip with some mild pain more proximally. No palmar, wrist, forearm pain. No fever/chills or other constitutional symptoms  He is on MWF dialysis and there is fistula LUE      Past Medical History:        Diagnosis Date    Cardiomyopathy [facility])     Cerebrovascular disease     CHF (congestive heart failure) ([facility] [address]     Dialysis patient [facility])     M-[facility]-F [facility] Encounter for imaging to screen for metal prior to MRI 11/04/2021    NOT MRI Conditional [facility] ICD Model#[id number] Serial# [id number] implanted 1/22/14 Lead: RV 1580/65 implanted 8/23/06 at [facility]. Patients ICD system is NOT approved for MRI(battery and lead).  Patient is unable to have any type of MRI.    H/O medication noncompliance     HTN (hypertension) 08/16/2011    Other and unspecified hyperlipidemia 08/16/2011    Pacemaker     Presence of implantable cardioverter-defibrillator (ICD)     Sleep disorder     Tachycardia 08/16/2011    TIA (transient ischemic attack) 08/16/2011     Past Surgical History:        Procedure Laterality Date    CARDIAC DEFIBRILLATOR PLACEMENT  2013    [facility]    DIALYSIS FISTULA CREATION Left 3/4/2021    LEFT BACHIAL CEPHALIC FISTULA CREATION performed by Julian Calderon MD at [place] [address] Bilateral [id number]    PACEMAKER PLACEMENT  2013         Medications Prior to Admission:   Prior to Admission medications    Medication Sig Start Date End Date Taking? Authorizing Provider   midodrine (PROAMATINE) 5 MG tablet Take 5 mg by mouth 3 times daily (with meals)   Yes Historical Provider, MD   HYDROcodone-acetaminophen (NORCO) 5-325 MG per tablet Take 1 tablet by mouth 2 times daily as needed for Pain for up to 30 days. 10/7/21 11/6/21 Yes Valentin Gardiner MD   apixaban (ELIQUIS) 2.5 MG TABS tablet Take 2.5 mg by mouth 2 times daily    Yes Historical Provider, MD   pantoprazole (PROTONIX) 40 MG tablet Take 1 tablet by mouth 10/14/20   Historical Provider, MD   sertraline (ZOLOFT) 50 MG tablet TAKE ONE TABLET BY MOUTH DAILY.  DISCONTINUE FLUOXETINE 3/15/21   Haylie Perez MD   Ferric Citrate 1  MG(Fe) TABS Take by mouth 2 times daily    Historical Provider, MD   Multiple Vitamins-Minerals (RENAPLEX-D) TABS Take by mouth daily    Historical Provider, MD   amiodarone (CORDARONE) 200 MG tablet Take 1 tablet by mouth daily  Patient taking differently: Take 200 mg by mouth nightly  5/18/20   Haylie Perez MD   torsemide (DEMADEX) 20 MG tablet Take 1 tablet by mouth See Admin Instructions 2 pills daily at 6 am and one daily at noon  Patient taking differently: Take 20 mg by mouth See Admin Instructions 2 pills daily at 6 am and one daily at noon - not taking 5/15/20   Haylie Perez MD allopurinol (ZYLOPRIM) 100 MG tablet Take 1 tablet by mouth daily 8/28/19   Lajuanda Ganser, MD       Allergies:  Corn-containing products    Social History:    Tobacco:  reports that he has been smoking cigarettes. He has a 7.50 pack-year smoking history. He has never used smokeless tobacco.   Alcohol:  reports no history of alcohol use. Illicit Drug: No  Family History:       Problem Relation Age of Onset    Osteoarthritis Father     Hypertension Father     Cancer Father         prostate, rectal    Stroke Father     ADHD Sister        REVIEW OF SYSTEMS:   All additional Review of Systems were reviewed and noted to be negative or noncontributory today.    CONSTITUTIONAL:  negative  MUSCULOSKELETAL:  positive for  Pain left ring finger    PHYSICAL EXAM:    awake, alert, cooperative, no apparent distress, and appears stated age  MUSCULOSKELETAL:  there is no redness, warmth, or swelling of the joints  full range of motion noted  motor strength is 5 out of 5 all extremities bilaterally  tone is normal  with exception of    Left ring finger/hand:  Swelling of left ring finger most prominent distally at finger pulp and perionychium  No active drainage or purulence  Erythema not appreciated extensively secondary to skin pigmentation  There is more diffuse swelling mild of finger  No open wounds, abrasions   No palmar swelling or swelling throughout remainder of LUE  Tenderness at fingertip distal to DIP joint  Mild tenderness along volar aspect and flexor tendon sheath extending near A2 pulley with minimal tenderness at a1 pulley  No palmar or wrist tenderness  Limited ROM at fingertip DIP joint, mild pain with motion PIP joint  No pain with wrist ROM  Sensation intact to light touch at fingertip left ring finger, brisk cap refill at finger      DATA:    CBC:   Recent Labs     11/03/21  0417 11/03/21  1630 11/04/21  0510   WBC 5.2 4.0 4.6   HGB 13.5 12.9* 13.0*    185 182     BMP:    Recent Labs 11/02/21  1911 11/03/21 0417 11/04/21  0510    132* 135*   K 4.5 4.6 4.7   CL 95* 93* 95*   CO2 31 26 27   BUN 18 21* 18   CREATININE 5.7* 5.8* 4.9*   GLUCOSE 89 89 84     INR:   Recent Labs     11/03/21 0417 11/03/21  1630   INR 1.25* 1.22*       Radiology:  Left hand images personally reviewed by me, agree with below findings including lytic changes throughout distal phalanx likely representing osteomyelitis of distal phalanx  XR HAND LEFT (MIN 3 VIEWS)   Final Result      1. Abnormal lytic change affecting the distal phalanx of the fourth finger with overlying soft tissue swelling and ostial lysis consistent most likely with osteomyelitis of the finger with advanced lytic distal phalanx bone destruction      2. Advanced arthritic change affecting the base of the thumb at the first carpal-metacarpal articulation and advanced joint space loss at the first and second metacarpal phalangeal joint with subchondral erosions of the first and second metacarpal head                   IMPRESSION/RECOMMENDATIONS:    Assessment: 78 y/o M history of left ring finger infection, osteomyelitis distal phalanx, possible component of more proximal extension and flexor tenosynovitis    Plan:  1) I discussed with patient his exam and images  MRI unable to obtain secondary to implant    I would recommend antibiotic management in addition to surgical incision and drainage and based on the amount of bone lysis at distal phalanx likely plan for revision amputation at the DIP joint or middle phalanx  I would also plan for incision and drainage of flexor tendon sheath to ensure no proximal extension based on exam  Risks/benefits/alternatives discussed with patient and patient understands likelihood of amputation to prevent further infection, allow for healing, and ultimately improved function.  Further infection, bleeding, damage to tendon, nerve,vessel, sensitivity and neuroma formation, stiffness, and risks of anesthesia all discussed as risks  Labs reviewed, INR 1.2, elevated ESR  Patient is understanding of treatment options and would likely to proceed with plan, consent obtained for local/mac anesthesia    F/u intraoperative cultures, continue empiric abx for now. Thank you for the opportunity to consult on this patient.     MD Pamela Wolfe MD

## 2021-11-04 NOTE — PROGRESS NOTES
Pt A&O x4, VSS. C/o of pain this shift to left hand and fingers but does not want to take an prn medications. Pt has been NPO since midnight and is on normal saline 50 mL/hr for possible surgery today. Voiding adequately to bathroom with cane with no issues. Fall precautions in place. Pt in bed asleep with call light in reach.

## 2021-11-04 NOTE — CARE COORDINATION
Case Management Assessment           Initial Evaluation                Date / Time of Evaluation: 11/4/2021 7:40 AM                 Assessment Completed by: JAN Snyder    Patient Name: Selene Burroughs. YOB: 1945  Diagnosis: Osteomyelitis ([facility] [address] [M86.9]  ESRD on dialysis ([facility] [address] [N18.6, Z99.2]  Felon of finger of left hand with lymphangitis [L03.022]     Date / Time: 11/2/2021  6:01 PM    Patient Admission Status: Inpatient    If patient is discharged prior to next notation, then this note serves as note for discharge by case management. Current PCP: Amaris Jimenez MD  Clinic Patient: No    Chart Reviewed: Yes  Patient/ Family Interviewed: Yes    Initial assessment completed at bedside with: Patient, daughter at bedside     Hospitalization in the last 30 days: No    Emergency Contacts:  Extended Emergency Contact Information  Primary Emergency Contact: Lizbeth Espino[address] [place] [address] Phone: [phone]  Mobile Phone: [phone]  Relation: Spouse  Secondary Emergency Contact: Harris Bland [address] Phone: [phone]  Relation: Child    Advance Directives:   Code Status: [address]: No    Financial:  Payor: Caryn Ambrosio / Plan: Jessica Awad PPO / Product Type: Medicare /     Pre-cert required for SNF: Yes    Pharmacy:    [facility] [address] [place] [address] [address], [address] [place] [address]  Phone: [phone] Fax: [phone]      Potential assistance Purchasing Medications: Potential Assistance Purchasing Medications: No  Does Patient want to participate in local refill/ meds to beds program?: Not Assessed    Meds To Beds General Rules:  1. Can ONLY be done Monday- Friday between 8:30am-5pm  2.  Prescription(s) must be in pharmacy by 3pm to be filled same day  3. Copy of patient's insurance/ prescription drug card and patient face sheet must be sent along with the prescription(s)  4. Cost of Rx cannot be added to hospital bill. If financial assistance is needed, please contact unit  or ;  or  CANNOT provide pharmacy voucher for patients co-pays  5. Patients can then  the prescription on their way out of the hospital at discharge, or pharmacy can deliver to the bedside if staff is available. (payment due at time of pick-up or delivery - cash, check, or card accepted)     Able to afford home medications/ co-pay costs: Yes    ADLS:  Support Systems: Spouse/Significant Other    PT AM-PAC: 19 /24  OT AM-PAC: 18 /24    Housing:  Home Environment: Home with family   Steps: yes     Plans to RETURN to current housing: Yes  Barrier(s) to RETURNING to current housing: surgical plan     Home Care Information:  Currently ACTIVE with [email or web address]: No  Home Care Agency: Not Applicable    Currently ACTIVE with [facility]: No    Durable Medical Equipment:  DME Provider: n/a   Equipment: cane    Home Oxygen and Respiratory Equipment:  Has HOME OXYGEN prior to admission: No    Dialysis:  Active with HD/PD prior to admission: Yes  Nephrologist: n/a     HD Center:  [facility]  Address: [address] [place] [address]   Phone: [phone]  Monday, [facility], Friday     DISCHARGE PLAN:  Disposition: Home    Transportation PLAN for discharge: family     Factors facilitating achievement of predicted outcomes: Family support, Motivated, Cooperative, Pleasant and Sense of humor    Barriers to discharge: Surgical Plan     Additional Case Management Notes:   SW met with patient and daughter at bedside. Patient from home where he is generally independent at baseline. Patient uses a can for mobility. He has strong family support for any ADL or transport needs. Patient plans to return home at discharge.  Patient denied any SW needs at this time. He will continue community HD at [facility]. Pending Ortho plan. The Plan for Transition of Care is related to the following treatment goals Osteomyelitis ([facility] [address] [M86.9]  ESRD on dialysis ([facility] [address] [N18.6, Z99.2]  Felon of finger of left hand with lymphangitis [L03.022]      The Patient and/or patient representative  was provided with a choice of provider and agrees with the discharge plan Yes    Freedom of choice list was provided with basic dialogue that supports the patient's individualized plan of care/goals and shares the quality data associated with the providers.  Yes    Care Transition patient: Yes    JAN Ramirez  [facility]   Case Management Department  Ph: [phone]

## 2021-11-04 NOTE — PROGRESS NOTES
MRI staff called to say that patient is unable to complete MRI because pacemaker not compatible with MRI.

## 2021-11-04 NOTE — CONSULTS
Clinical Pharmacy Progress Note    Vancomycin - Management by Pharmacy    Consult Date(s): 11/2/2021  Consulting Provider(s): Dr. Mitesh Hastings / Plan    1) Osteomyelitis of L 4th phalange - Vancomycin + Cefepime   Concurrent Antimicrobials:   o Cefepime - Day #2  - HD scheduled MWF- will adjust cefepime to 2g IV MWF to be given after HD   Day of Vanc Therapy: #2   Current Dosing Method: Intermittent Dosing by Levels   Therapeutic Goal: 15-20 mcg/mL   Current Dose / Plan / Rationale:   o Given ESRD on HD, will dose vancomycin based on intermittent levels with HD.  o Dosing placeholder is ordered  o Random level yesterday was 13.2 mg/L. 1000 mg ordered yesterday to be given post HD, but appears dose given prior to HD-so expect that some of dose removed by HD.  o Added random level this am = 18.3 mg/L.    o No additional dose needed today   o Random level ordered prior to HD tomorrow   Will continue to monitor clinical condition and make adjustments to regimen as appropriate. Please call with questions:  [phone]  (office)  [phone] ([place])    [address] [facility]    11/4/2021 7:54 AM        Interval update: Possible amputation L 4th phalange today or tomorrow pending MRI. Subjective/Objective: Mr. Ronald Lawton is a 77 y.o. male with a PMHx significant for ESRD on HD, HTN, HFrEF, AFib who presented to ED 11/2 with 1 week history of pain and swollen L 4th finger. Pt presented to PCP on 10/21 and was started on 10-day course of Augmentin. In ED 11/2, XRay of L hand was concerning for osteomyelitis. [facility] has been consulted to dose vancomycin. Pertinent Antimicrobials:  Vancomycin - pharmacy to dose (11/2-current)   Intermittent dosing (11/2-current)  Date Dose Vanc Level HD   11/2 1000 mg IV     11/3 1000 mg IV given pre-HD 13.2 mg/L 3.5 h    11/4 --- 18.3 mg/L ---           Height:   Ht Readings from Last 1 Encounters:   11/02/21 5' 6\" (1.676 m)     Weight:    Wt Readings from Last 1 Encounters:   11/03/21 130 lb 15.3 oz (59.4 kg)     Cultures & Sensitivities:    Date Site Micro Susceptibility / Result   11/3 Blood NGTD            Labs / Ancillary Data:    Est CrCl not calculated d/t ESRD on HD    Recent Labs     11/02/21 1911 11/02/21 1911 11/03/21  0417 11/03/21  1630 11/04/21  0510   CREATININE 5.7*  --  5.8*  --  4.9*   BUN 18  --  21*  --  18   WBC 4.5   < > 5.2 4.0 4.6    < > = values in this interval not displayed.

## 2021-11-04 NOTE — PROGRESS NOTES
1851 Admitted to PACU from [address] [place] [address]. Connected to monitor. Report at bedside. Oral airway removed on arrival. Restless, started to remove dressing - reapplied per Dr Cristina Weiss. Denies pain.

## 2021-11-04 NOTE — PROGRESS NOTES
PACU Transfer to [facility]    Vitals:    11/04/21 1930   BP: 117/87   Pulse: 75   Resp: 16   Temp: 98.6 °F (37 °C)   SpO2: 96%         Intake/Output Summary (Last 24 hours) at 11/4/2021 1936  Last data filed at 11/4/2021 1930  Gross per 24 hour   Intake 383 ml   Output 0 ml   Net 383 ml       Pain assessment:  none  Pain Level: 0    Patient transferred to care of [facility] GARETH Dyer.     11/4/2021 7:36 PM

## 2021-11-04 NOTE — ANESTHESIA POSTPROCEDURE EVALUATION
Department of Anesthesiology  Postprocedure Note    Patient: Jeremiah Palomo MRN: [id number]  YOB: 1945  Date of evaluation: 11/4/2021  Time:  7:00 PM     Procedure Summary     Date: 11/04/21 Room / Location: [address] [place] [address] [address] [place] [address] / [facility]    Anesthesia Start: [address] Anesthesia Stop: 1848    Procedure: irrigation and DEBRIDEMENT middle sheath LEFT RING FINGER, Partial AMPUTATION LEFT RING FINGER (Left ) Diagnosis: (LEFT RING FINGER INFECTION/OSTEOMYELITIS)    Surgeons: Michael Morillo MD Responsible Provider: [facility]    Anesthesia Type: MAC ASA Status: 4          Anesthesia Type: MAC    Becky Phase I:      Becky Phase II:      Last vitals: Reviewed and per EMR flowsheets.        Anesthesia Post Evaluation    Patient location during evaluation: PACU  Patient participation: complete - patient participated  Level of consciousness: awake  Pain score: 0  Airway patency: patent  Nausea & Vomiting: no nausea and no vomiting  Complications: no  Cardiovascular status: blood pressure returned to baseline  Respiratory status: acceptable  Hydration status: euvolemic

## 2021-11-04 NOTE — PROGRESS NOTES
Patient finished up light breakfast, per physician request, to resume NPO at this time. Consent form signed and in patient chart.

## 2021-11-05 ENCOUNTER — TELEPHONE (OUTPATIENT)
Dept: INTERNAL MEDICINE CLINIC | Age: 76
End: 2021-11-05

## 2021-11-05 LAB
ALBUMIN SERPL-MCNC: 3.4 G/DL (ref 3.4–5)
ANION GAP SERPL CALCULATED.3IONS-SCNC: 15 MMOL/L (ref 3–16)
BASOPHILS ABSOLUTE: 0 K/UL (ref 0–0.2)
BASOPHILS RELATIVE PERCENT: 0.5 %
BUN BLDV-MCNC: 26 MG/DL (ref 7–20)
CALCIUM SERPL-MCNC: 9.1 MG/DL (ref 8.3–10.6)
CHLORIDE BLD-SCNC: 97 MMOL/L (ref 99–110)
CO2: 23 MMOL/L (ref 21–32)
CREAT SERPL-MCNC: 6.4 MG/DL (ref 0.8–1.3)
EOSINOPHILS ABSOLUTE: 0 K/UL (ref 0–0.6)
EOSINOPHILS RELATIVE PERCENT: 0.8 %
GFR AFRICAN AMERICAN: 10
GFR NON-AFRICAN AMERICAN: 9
GLUCOSE BLD-MCNC: 100 MG/DL (ref 70–99)
HCT VFR BLD CALC: 36.6 % (ref 40.5–52.5)
HEMOGLOBIN: 11.9 G/DL (ref 13.5–17.5)
LYMPHOCYTES ABSOLUTE: 0.7 K/UL (ref 1–5.1)
LYMPHOCYTES RELATIVE PERCENT: 11.7 %
MAGNESIUM: 2.3 MG/DL (ref 1.8–2.4)
MCH RBC QN AUTO: 34.4 PG (ref 26–34)
MCHC RBC AUTO-ENTMCNC: 32.4 G/DL (ref 31–36)
MCV RBC AUTO: 106.3 FL (ref 80–100)
MONOCYTES ABSOLUTE: 0.9 K/UL (ref 0–1.3)
MONOCYTES RELATIVE PERCENT: 16 %
NEUTROPHILS ABSOLUTE: 4 K/UL (ref 1.7–7.7)
NEUTROPHILS RELATIVE PERCENT: 71 %
PDW BLD-RTO: 17.3 % (ref 12.4–15.4)
PHOSPHORUS: 5.8 MG/DL (ref 2.5–4.9)
PLATELET # BLD: 182 K/UL (ref 135–450)
PMV BLD AUTO: 7.9 FL (ref 5–10.5)
POTASSIUM SERPL-SCNC: 4.9 MMOL/L (ref 3.5–5.1)
RBC # BLD: 3.44 M/UL (ref 4.2–5.9)
SODIUM BLD-SCNC: 135 MMOL/L (ref 136–145)
VANCOMYCIN RANDOM: 15.9 UG/ML
WBC # BLD: 5.6 K/UL (ref 4–11)

## 2021-11-05 PROCEDURE — 6370000000 HC RX 637 (ALT 250 FOR IP): Performed by: ORTHOPAEDIC SURGERY

## 2021-11-05 PROCEDURE — 36415 COLL VENOUS BLD VENIPUNCTURE: CPT

## 2021-11-05 PROCEDURE — 90935 HEMODIALYSIS ONE EVALUATION: CPT

## 2021-11-05 PROCEDURE — 6360000002 HC RX W HCPCS: Performed by: STUDENT IN AN ORGANIZED HEALTH CARE EDUCATION/TRAINING PROGRAM

## 2021-11-05 PROCEDURE — 80069 RENAL FUNCTION PANEL: CPT

## 2021-11-05 PROCEDURE — 2580000003 HC RX 258: Performed by: ORTHOPAEDIC SURGERY

## 2021-11-05 PROCEDURE — 83735 ASSAY OF MAGNESIUM: CPT

## 2021-11-05 PROCEDURE — 1200000000 HC SEMI PRIVATE

## 2021-11-05 PROCEDURE — 85025 COMPLETE CBC W/AUTO DIFF WBC: CPT

## 2021-11-05 PROCEDURE — 80202 ASSAY OF VANCOMYCIN: CPT

## 2021-11-05 PROCEDURE — 2580000003 HC RX 258: Performed by: STUDENT IN AN ORGANIZED HEALTH CARE EDUCATION/TRAINING PROGRAM

## 2021-11-05 PROCEDURE — 6360000002 HC RX W HCPCS: Performed by: ORTHOPAEDIC SURGERY

## 2021-11-05 PROCEDURE — 99232 SBSQ HOSP IP/OBS MODERATE 35: CPT | Performed by: HOSPITALIST

## 2021-11-05 RX ADMIN — SODIUM CHLORIDE, PRESERVATIVE FREE 10 ML: 5 INJECTION INTRAVENOUS at 19:57

## 2021-11-05 RX ADMIN — SODIUM CHLORIDE, PRESERVATIVE FREE 10 ML: 5 INJECTION INTRAVENOUS at 08:42

## 2021-11-05 RX ADMIN — HYDROMORPHONE HYDROCHLORIDE 1 MG: 1 INJECTION, SOLUTION INTRAMUSCULAR; INTRAVENOUS; SUBCUTANEOUS at 16:50

## 2021-11-05 RX ADMIN — SERTRALINE HYDROCHLORIDE 50 MG: 50 TABLET ORAL at 08:42

## 2021-11-05 RX ADMIN — HYDROMORPHONE HYDROCHLORIDE 1 MG: 1 INJECTION, SOLUTION INTRAMUSCULAR; INTRAVENOUS; SUBCUTANEOUS at 22:18

## 2021-11-05 RX ADMIN — PANTOPRAZOLE SODIUM 40 MG: 40 TABLET, DELAYED RELEASE ORAL at 05:07

## 2021-11-05 RX ADMIN — AMIODARONE HYDROCHLORIDE 200 MG: 200 TABLET ORAL at 19:57

## 2021-11-05 RX ADMIN — HYDROMORPHONE HYDROCHLORIDE 0.5 MG: 1 INJECTION, SOLUTION INTRAMUSCULAR; INTRAVENOUS; SUBCUTANEOUS at 05:07

## 2021-11-05 RX ADMIN — TORSEMIDE 20 MG: 20 TABLET ORAL at 08:42

## 2021-11-05 ASSESSMENT — PAIN DESCRIPTION - DESCRIPTORS
DESCRIPTORS: ACHING
DESCRIPTORS: ACHING

## 2021-11-05 ASSESSMENT — PAIN SCALES - GENERAL
PAINLEVEL_OUTOF10: 0
PAINLEVEL_OUTOF10: 8
PAINLEVEL_OUTOF10: 8
PAINLEVEL_OUTOF10: 0
PAINLEVEL_OUTOF10: 10
PAINLEVEL_OUTOF10: 0
PAINLEVEL_OUTOF10: 0
PAINLEVEL_OUTOF10: 7
PAINLEVEL_OUTOF10: 8

## 2021-11-05 ASSESSMENT — PAIN DESCRIPTION - LOCATION
LOCATION: FINGER (COMMENT WHICH ONE)
LOCATION: FINGER (COMMENT WHICH ONE)

## 2021-11-05 ASSESSMENT — ENCOUNTER SYMPTOMS
VOMITING: 0
ABDOMINAL PAIN: 0
NAUSEA: 0
ABDOMINAL DISTENTION: 0
DIARRHEA: 0
SHORTNESS OF BREATH: 0
CONSTIPATION: 0
COUGH: 0

## 2021-11-05 ASSESSMENT — PAIN DESCRIPTION - PAIN TYPE
TYPE: ACUTE PAIN;SURGICAL PAIN
TYPE: SURGICAL PAIN

## 2021-11-05 ASSESSMENT — PAIN DESCRIPTION - PROGRESSION
CLINICAL_PROGRESSION: NOT CHANGED
CLINICAL_PROGRESSION: NOT CHANGED

## 2021-11-05 ASSESSMENT — PAIN DESCRIPTION - FREQUENCY
FREQUENCY: CONTINUOUS
FREQUENCY: CONTINUOUS

## 2021-11-05 ASSESSMENT — PAIN - FUNCTIONAL ASSESSMENT: PAIN_FUNCTIONAL_ASSESSMENT: ACTIVITIES ARE NOT PREVENTED

## 2021-11-05 ASSESSMENT — PAIN DESCRIPTION - ONSET
ONSET: ON-GOING
ONSET: ON-GOING

## 2021-11-05 ASSESSMENT — PAIN DESCRIPTION - ORIENTATION
ORIENTATION: LEFT
ORIENTATION: LEFT

## 2021-11-05 NOTE — TELEPHONE ENCOUNTER
----- Message from Antonio Garcia sent at 11/3/2021  3:41 PM EDT -----  Subject: Message to Provider    QUESTIONS  Information for Provider? Pt was previously seen for a swollen and black   finger by Dr. Paulo Cantu. The pt is currently at the [facility] waiting to   see if he will need to have the finger removed or drained of poison. ---------------------------------------------------------------------------  --------------  Filemon BARROSO  What is the best way for the office to contact you? OK to leave message on   voicemail  Preferred Call Back Phone Number? [phone]  ---------------------------------------------------------------------------  --------------  SCRIPT ANSWERS  Relationship to Patient?  Self

## 2021-11-05 NOTE — PROGRESS NOTES
Nephrology Consult Note        [facility] Nephrology    [facility]       Phone: [phone]                                                  11/5/2021 3:22 PM     Patient: Aspen Ron. [id number]  [id number]  Date of Admit: 11/2/2021 LOS: 3 days    Interval History and Plan:  Patient feel fine. No SOB on room air. Patient refused HD in morning but now agreeable. HD today per MWF schedule with UF to target his EDW  Hb stable. No need of HARSHAD  Monitor Phos while here. No phosphate binder while inpatient. S/P surgery for hand Osteomyelitis. Avoid nephrotoxins  Monitor input, output and daily weight  Renally dose all medications  Low K diet. Thank you for allowing us to participate in this patient's care    In case of any question please call us at our 24 hour answering service [phone] or from 7 AM to 5 PM via [employer] or cell number    Damien Miranda MD  [facility] Nephrology  [facility] [address]  [place], [address]  Fax: [phone]  Office: [phone]       Assessment & Plan     Renal function:  ESRD  iHD MWF  AVF    Electrolytes:  Lab Results   Component Value Date    [facility] 11/05/2021    BUN 26 (H) 11/05/2021     (L) 11/05/2021    K 4.9 11/05/2021    CL 97 (L) 11/05/2021    CO2 23 11/05/2021            # CKD MBD  No PTH available in system. Monitor Phos level  Ca level OK    Lab Results   Component Value Date    CALCIUM 9.1 11/05/2021    PHOS 5.8 (H) 11/05/2021         Acid/Base status:  Stable. Volume status/BP:  BP stable. No acute volume overload concerns. On room air.      Intake/Output Summary (Last 24 hours) at 11/5/2021 1522  Last data filed at 11/5/2021 1055  Gross per 24 hour   Intake 963 ml   Output 0 ml   Net 963 ml         Hematology:   Lab Results   Component Value Date    IRON 74 08/10/2016    TIBC 202 (L) 08/10/2016     Lab Results   Component Value Date    WBC 5.6 11/05/2021    HGB 11.9 (L) 11/05/2021    HCT 36.6 (L) 11/05/2021    .3 (H) 11/05/2021     11/05/2021           Reason for Consult and Chief Complaint     Reason for consult: ESRD on HD    Chief complaint:   Chief Complaint   Patient presents with    Hand Pain     pt c/o a swollen 4th digit and hand on the left hand x 4 weeks and getting worse. Two dyas ago became black and more swollen after antibiotics. History of Present Illness   Hamida Hinton is a 77 y.o. with PMH significant for ESRD on HD MWF, HTN, HFrEF, and Afib admitted overnight with pain and swelling of left hand finger/hand since last one week. Imaging showed concern for osteomyelitis and he is on Antibiotics. Patient otherwise denies SOB, chest pain, GI symptoms. Last dialysis was on Monday as per his schedule and no issues with AVF    Review of Systems     GEN: No recent fever, chills or night sweats  HEENT: No changes in vision, sore throat, rhinorrhea   CVS: No cp, palpitations or swelling in legs  Pulmonary: No cough or hemoptysis or SOB  GI: No nausea, vomiting, diarrhea, constipation or abdominal pain  : No bowel or bladder incontinence, no dysuria, no hematuria. MSK:  Left hand/finger pain. Skin: No rashes. CNS: No headache, dizziness, confusion or focal weakness. No seizure. Psych: No anxiety, agitation or depression. Reviewed all 12 systems, negative except as above. Past Medical History     Past Medical History:   Diagnosis Date    Cardiomyopathy [facility])     Cerebrovascular disease     CHF [facility]congestive heart failure) ([facility] [address]     Dialysis patient [facility])     M-W-F [facility] Janeth Kong Encounter for imaging to screen for metal prior to MRI 11/04/2021    NOT MRI Conditional [facility] ICD Model#[id number] Serial# [id number] implanted 1/22/14 Lead: RV 1580/65 implanted 8/23/06 at [facility]. Patients ICD system is NOT approved for MRI(battery and lead).  Patient is unable to have any type of MRI.    H/O medication noncompliance     HTN (hypertension) 08/16/2011    Other and unspecified hyperlipidemia 08/16/2011    Pacemaker     Presence of implantable cardioverter-defibrillator (ICD)     Sleep disorder     Tachycardia 08/16/2011    TIA (transient ischemic attack) 08/16/2011         Past Surgical History     Past Surgical History:   Procedure Laterality Date    CARDIAC DEFIBRILLATOR PLACEMENT  2013    [facility]    DIALYSIS FISTULA CREATION Left 3/4/2021    LEFT BACHIAL CEPHALIC FISTULA CREATION performed by Ilan Mckenna MD at [address] HAND SURGERY Left 11/4/2021    irrigation and DEBRIDEMENT middle sheath LEFT RING FINGER, Partial AMPUTATION LEFT RING FINGER performed by Carol Fischer MD at [address] Bilateral [phone]    PACEMAKER PLACEMENT  2013         Family History     Family History   Problem Relation Age of Onset    Osteoarthritis Father     Hypertension Father     Cancer Father         prostate, rectal    Stroke Father     ADHD Sister          Social History     Social History     Tobacco Use    Smoking status: Current Every Day Smoker     Packs/day: 0.25     Years: 30.00     Pack years: 7.50     Types: Cigarettes     Last attempt to quit: 10/13/2018     Years since quitting: 3.0    Smokeless tobacco: Never Used    Tobacco comment: pt has cut down   Substance Use Topics    Alcohol use: No     Alcohol/week: 0.0 standard drinks          Past medical, family, and social histories were reviewed as previously documented. Updates were made as necessary. Inpatient Medications and Allergies       Scheduled Meds:   vancomycin  1,000 mg IntraVENous Once    cefepime  2,000 mg IntraVENous Q MWF    amiodarone  200 mg Oral Nightly    pantoprazole  40 mg Oral QAM AC    sertraline  50 mg Oral Daily    torsemide  20 mg Oral BID    sodium chloride flush  5-40 mL IntraVENous 2 times per day    vancomycin (VANCOCIN) intermittent dosing (placeholder)   Other See Admin Instructions       Allergies:    Allergies   Allergen Reactions    Corn-Containing Products      Other reaction(s): GI Upset  Only corn kernel         Vital Signs     Vitals:    11/05/21 1448   BP: 118/79   Pulse: 80   Resp: 16   Temp: 98.1 °F (36.7 °C)   SpO2:          Intake/Output Summary (Last 24 hours) at 11/5/2021 1522  Last data filed at 11/5/2021 1055  Gross per 24 hour   Intake 963 ml   Output 0 ml   Net 963 ml         Physical Exam     General appearance: NAD  Head: Normocephalic, without obvious abnormality, atraumatic   Mouth: Moist mucous membrane  Neck: Supple. Lungs: Clear to auscultation bilaterally and no respiratory distress on RA  Heart: S1, S2.  Abdomen: soft, non-tender non-distended  Extremities: No leg edema, warm to touch. Right hand/fingers dressed. Skin: No concerning rashes noted  Psych: good eye contact, normal affect  Neuro: AAO x 3  AVF on left arm, Dressed.      Laboratory Data     Please see above     Diagnostic Studies   Pertinent images reviewed

## 2021-11-05 NOTE — CONSULTS
Clinical Pharmacy Progress Note    Vancomycin - Management by Pharmacy    Consult Date(s): 11/2/2021  Consulting Provider(s): Dr. Daja Love / Plan    1) Osteomyelitis of L 4th phalange - Vancomycin + Cefepime   Concurrent Antimicrobials:   o Cefepime - Day #4  - Adjusted to 2 g MWF after HD   Day of Vanc Therapy: #4   Current Dosing Method: Intermittent Dosing by Levels   Therapeutic Goal: 15-20 mcg/mL   Current Dose / Plan / Rationale:   o Given ESRD on HD, will dose vancomycin based on intermittent levels with HD.  o Dosing placeholder is ordered  o Random level this am = 15.9 mcg/mL. May be clearing somewhat as level was 18.3 yesterday . o HD planned this am  o Will order 1000 mg IV post-HD today  o Random level ordered tomorrow to assure adequate levels until next HD Monday   Will continue to monitor clinical condition and make adjustments to regimen as appropriate. Please call with questions:  [phone]  (office)  [phone] (wireless)    [facility]. . [facility]    11/5/2021 8:55 AM        Interval update:  Remains afebrile. S/p amputation of L ring finger and I&D of flexor tendon last evening- sent for culture. Per ortho continue ABX until cultures result. Subjective/Objective: Mr. Avelina Garcia. is a 77 y.o. male with a PMHx significant for ESRD on HD, HTN, HFrEF, AFib who presented to ED 11/2 with 1 week history of pain and swollen L 4th finger. Pt presented to PCP on 10/21 and was started on 10-day course of Augmentin. In ED 11/2, XRay of L hand was concerning for osteomyelitis. [facility] has been consulted to dose vancomycin.     Pertinent Antimicrobials:  Vancomycin - pharmacy to dose (11/2-current)   Intermittent dosing (11/2-current)  Date Dose Vanc Level HD   11/2 1000 mg IV     11/3 1000 mg IV given pre-HD 13.2 mcg/mL HD x 3.5 h    11/4 --- 18.3 mcg/mL ---   11/5 1000 mg post-HD 15.9 mcg/mL HD scheduled     Height:   Ht Readings from Last 1 Encounters: 11/02/21 5' 6\" (1.676 m)     Weight:   Wt Readings from Last 1 Encounters:   11/05/21 130 lb 4.7 oz (59.1 kg)     Cultures & Sensitivities:    Date Site Micro Susceptibility / Result   11/3 Blood NGTD    11/4 L ring finger pending      Labs / Ancillary Data:    Est CrCl not calculated d/t ESRD on HD    Recent Labs     11/03/21  0417 11/03/21  0417 11/03/21  1630 11/04/21  0510 11/05/21  0614   CREATININE 5.8*  --   --  4.9* 6.4*   BUN 21*  --   --  18 26*   WBC 5.2   < > 4.0 4.6 5.6    < > = values in this interval not displayed.

## 2021-11-05 NOTE — DISCHARGE SUMMARY
INTERNAL MEDICINE DEPARTMENT AT [address] [place] [address]  DISCHARGE SUMMARY    Patient ID: Robert Upton. Discharge Date: 11/7/2021   Patient's PCP: Lino Yoon MD                                          Discharge Physician: Jazmin Bradford MD  Admit Date: 11/2/2021   Admitting Physician: Michelle Ayoub MD    PROBLEMS DURING HOSPITALIZATION:  Present on Admission:   Osteomyelitis ([address]   Chronic left systolic heart failure ([address]   Left hand pain    DISCHARGE DIAGNOSES:    HPI:   Mr. Fannie Hdz is a 71-year-old male with a past medical history of macrocytic anemia, ESRD on hemodialysis, HFrEF, hypertension, atrial fibrillation, major depressive disorder who presented to the hospital with swelling and tenderness in his left ring finger. Plain films obtained of the left hand demonstrated lytic lesions concerning for osteomyelitis, and orthopedics was consulted. Broad-spectrum antibiotics with vancomycin and cefepime were initiated. Patient was taken for orthopedic surgery with surgical removal of his distal phalange E with subsequent drainage of the flexor sheath and subsequent primary closure of the area. Dressing changes and further wound monitoring was performed by orthopedics while patient was here as an inpatient. Based on the subsequent cultures of the surgical site, Staphylococcus lugdunensis was isolated and it was sensitive to Bactrim. Once cleared by orthopedic surgery for discharge, the patient was given a prescription for pain medication and antibiotics and discharge from this facility. Patient was counseled that it was important to take his antibiotic after dialysis on the days that he has dialysis, and also follow-up with orthopedics in approximately a week for his wound to be rechecked with Dr. Eduarda Moncada.     The following issues were addressed during hospitalization:  Osteomyelitis of the left 4th finger, distal phalange   Macrocytic anemia ESRD  HFrEF  Essential HTN  Afib  MDD    Physical Exam:  /81   Pulse 83   Temp 98.3 °F (36.8 °C) (Oral)   Resp 16   Ht 5' 6\" (1.676 m)   Wt 130 lb 4.7 oz (59.1 kg)   SpO2 93%   BMI 21.03 kg/m²   Constitutional:       General: He is not in acute distress. Appearance: Normal appearance. He is normal weight. He is not ill-appearing. HENT:      Head: Normocephalic and atraumatic. Right Ear: External ear normal.      Left Ear: External ear normal.      Nose: Nose normal.      Mouth/Throat:      Mouth: Mucous membranes are moist.      Pharynx: Oropharynx is clear. Eyes:      Extraocular Movements: Extraocular movements intact. Conjunctiva/sclera: Conjunctivae normal.      Pupils: Pupils are equal, round, and reactive to light. Cardiovascular:      Rate and Rhythm: Normal rate and regular rhythm. Heart sounds: Normal heart sounds. No murmur heard. No friction rub. No gallop. Pulmonary:      Effort: Pulmonary effort is normal. No respiratory distress. Breath sounds: Normal breath sounds. No wheezing, rhonchi or rales. Abdominal:      General: Abdomen is flat. There is no distension. Palpations: Abdomen is soft. Tenderness: There is no abdominal tenderness. There is no guarding or rebound. Musculoskeletal:         General: Signs of injury (finger covered in surgical dressing with minimal dried blood. otherwise clean and dry. ) present. No tenderness. Cervical back: Normal range of motion and neck supple. Right lower leg: No edema. Left lower leg: No edema. Skin:     General: Skin is warm and dry. Capillary Refill: Capillary refill takes less than 2 seconds. Findings: No lesion. Neurological:      General: No focal deficit present. Mental Status: He is alert and oriented to person, place, and time. Psychiatric:         Mood and Affect: Mood normal.         Thought Content:  Thought content normal.     Consults: orthopedic surgery  Significant Diagnostic Studies:  x-ray of left hand(s)  Treatments: IV hydration, antibiotics: vancomycin and cefepime and surgery: left 4th digit Dip amputation and flexor sheath drainage with drainage catheter placement. Disposition: home  Discharged Condition: Stable  Follow Up: Primary Care Physician in one week    DISCHARGE MEDICATION:     Medication List      START taking these medications    sulfamethoxazole-trimethoprim 800-160 MG per tablet  Commonly known as: BACTRIM DS;SEPTRA DS  Take 1 tablet by mouth daily for 10 doses On days you have dialysis, take tablet after your dialysis session. CHANGE how you take these medications    amiodarone 200 MG tablet  Commonly known as: CORDARONE  Take 1 tablet by mouth daily  What changed: when to take this     HYDROcodone-acetaminophen 5-325 MG per tablet  Commonly known as: Norco  Take 1 tablet by mouth every 6 hours as needed for Pain for up to 7 days. Intended supply: 7 days. Take lowest dose possible to manage pain  What changed:   · when to take this  · additional instructions     torsemide 20 MG tablet  Commonly known as: Demadex  Take 1 tablet by mouth See Admin Instructions 2 pills daily at 6 am and one daily at noon  What changed: additional instructions        CONTINUE taking these medications    allopurinol 100 MG tablet  Commonly known as: ZYLOPRIM  Take 1 tablet by mouth daily     Eliquis 2.5 MG Tabs tablet  Generic drug: apixaban     Ferric Citrate 1  MG(Fe) Tabs     midodrine 5 MG tablet  Commonly known as: PROAMATINE     pantoprazole 40 MG tablet  Commonly known as: PROTONIX     RenaPlex-D Tabs     sertraline 50 MG tablet  Commonly known as: ZOLOFT  TAKE ONE TABLET BY MOUTH DAILY.  DISCONTINUE FLUOXETINE           Where to Get Your Medications      You can get these medications from any pharmacy    Bring a paper prescription for each of these medications  · HYDROcodone-acetaminophen 5-325 MG per tablet  · sulfamethoxazole-trimethoprim 800-160 MG per tablet       Activity: activity as tolerated  Diet: regular diet  Wound Care: keep wound clean and dry, wound care instructions per orthopedics:  \"Plan for continued dry dressing changes q1-2 days. Ok to leave open and exposed to air intermittently  Ok to start gentle hand hygiene\"    Time Spent on discharge is more than 30 minutes    Signed:  Anabella Jimenez MD, PGY-1  11/7/2021    Addendum to Resident H& P/Progress note:  I have personally seen,examined and evaluated the patient. I have reviewed the current history, physical findings, labs and assessment and plan and agree with note as documented by resident MD ( Sherice Ramos)  Discussed the patient's condition with his spouse and RN.     Richie Gomez MD, FACP

## 2021-11-05 NOTE — PROGRESS NOTES
Orthopedic Surgery   Progress Note      SUBJECTIVE: Patient with intermittent pain, mainly in left ring finger and surgical site. No fever/chills. No pain proximally or in palm/forearm. Cultures with no growth to date. OBJECTIVE:  /74   Pulse 76   Temp 97.8 °F (36.6 °C) (Oral)   Resp 16   Ht 5' 6\" (1.676 m)   Wt 130 lb 4.7 oz (59.1 kg)   SpO2 96%   BMI 21.03 kg/m²   General: pleasant, well appearing, no acute distress  Left hand/left ring finger  Dressing in place, dried blood near fingertip, no active bleeding  Bandage otherwise intact  Active motion comfortable of all other fingers no new swelling  No proximal swelling or erythema  Cap refill brisk all fingers with gross sensation intact median/ulnar/radial nerves      CBC:   Lab Results   Component Value Date    WBC 5.6 11/05/2021    RBC 3.44 11/05/2021    HGB 11.9 11/05/2021    HCT 36.6 11/05/2021    .3 11/05/2021    MCH 34.4 11/05/2021    MCHC 32.4 11/05/2021    RDW 17.3 11/05/2021     11/05/2021    MPV 7.9 11/05/2021     Hemoglobin/Hematocrit:    Lab Results   Component Value Date    HGB 11.9 11/05/2021    HCT 36.6 11/05/2021     PT/INR:    Lab Results   Component Value Date    PROTIME 13.9 11/03/2021    INR 1.22 11/03/2021     Chem Basic:   Lab Results   Component Value Date     11/05/2021    K 4.9 11/05/2021    K 4.5 11/02/2021    CL 97 11/05/2021    CO2 23 11/05/2021    GLUCOSE 100 11/05/2021    BUN 26 11/05/2021    CREATININE 6.4 11/05/2021       Cultures 11/4/2021: no growth to date bone and abscess fluid left ring finger      ASSESSMENT AND PLAN:  78 y/o M s/p revision amputation for osteomyelitis/infection left ring finger, I and D flexor tendon sheath 11/4/2021    1. Continue empiric abx  -f/u intraoperative cultures  2. Elevation LUE  3. PO/IV pain control as needed  4.  Plan for dressing change/packing removal tomorrow          Jean Carlos Macias MD  11/5/2021  7:47 PM

## 2021-11-05 NOTE — PROGRESS NOTES
I had the pleasure of taking care of . Richard Rose [address]. Bedside report was performed and I introduced myself before updating the white board and performing an initial assessment of the patient and obtaining vitals. Explained the purpose of the white board, today's date, and how to call out for assistance. Patient is comfortable possessions and position adjusted for ease of use. Vitals for today's shift were as follows. .. Vitals:    11/05/21 0715 11/05/21 1100 11/05/21 1101 11/05/21 1448   BP: 109/76 102/72 102/72 118/79   Pulse: 80 75 75 80   Resp: 14 16 16 16   Temp: 98.3 °F (36.8 °C) 98.2 °F (36.8 °C) 98.2 °F (36.8 °C) 98.1 °F (36.7 °C)   TempSrc: Oral Oral Oral    SpO2: 95% 96% 96%    Weight:    130 lb 4.7 oz (59.1 kg)   Height:            LDAs have been examined and maintained including IVs flushed and patent. Additional concerns from this shift that were addressed include   Pt refused dialysis this morning after lengthy conversation between the pt I md, and dialysis charge RN came in and pt still not complaint stating he does not want to go. Family called to be notified of change of plan pt still refusing. At 1145 pts sister called to state that pt was compliant with having dialysis. Dialysis charge nurse notified and patient placed on dialysis schedule for around 1400.

## 2021-11-05 NOTE — PROGRESS NOTES
Progress Note    Admit Date: 11/2/2021  Diet: ADULT DIET; Regular    CC: Left ring finger w/ swelling and pain    Interval history:  -NAEO  -Afeb, HDS, on RA  -OR yesterday for removal of distal phalanx with drainage and closure.   -Patient endorsing more pain today, PRN dilaudid increased. -Patient refusing pills and HD today. Discussed w/ patient at length about the dangers of interrupting his dialysis schedule. Patient verbalized understanding and that he did not want to proceed with HD or discuss it any further. Reached out to pt's nephrologist per PS. No response yet. Medications:     Scheduled Meds:   vancomycin  1,000 mg IntraVENous Once    cefepime  2,000 mg IntraVENous Q MWF    amiodarone  200 mg Oral Nightly    pantoprazole  40 mg Oral QAM AC    sertraline  50 mg Oral Daily    torsemide  20 mg Oral BID    sodium chloride flush  5-40 mL IntraVENous 2 times per day    vancomycin (VANCOCIN) intermittent dosing (placeholder)   Other See Admin Instructions     Continuous Infusions:   sodium chloride       PRN Meds:HYDROmorphone, sodium chloride flush, sodium chloride, ondansetron **OR** ondansetron, polyethylene glycol, acetaminophen **OR** acetaminophen    Objective:   Vitals:   T-max:  Patient Vitals for the past 8 hrs:   BP Temp Temp src Pulse Resp SpO2 Weight   11/05/21 0715 109/76 98.3 °F (36.8 °C) Oral 80 14 95 %    11/05/21 0600       130 lb 4.7 oz (59.1 kg)   11/05/21 0510 112/77 98.2 °F (36.8 °C) Oral 84 18 96 %        Intake/Output Summary (Last 24 hours) at 11/5/2021 1102  Last data filed at 11/5/2021 1055  Gross per 24 hour   Intake 963 ml   Output 0 ml   Net 963 ml       Review of Systems   Constitutional: Negative for chills, diaphoresis, fatigue and fever. HENT: Negative for congestion. Eyes: Negative for visual disturbance. Respiratory: Negative for cough and shortness of breath. Cardiovascular: Negative for chest pain, palpitations and leg swelling. Gastrointestinal: Negative for abdominal distention, abdominal pain, constipation, diarrhea, nausea and vomiting. Endocrine: Negative for polyuria. Genitourinary: Negative for decreased urine volume, difficulty urinating, dysuria, frequency, hematuria and urgency. Musculoskeletal: Positive for joint swelling (Left Finger). Skin: Positive for wound (Small wound on nailbed, non-purulent). Neurological: Negative for dizziness, syncope, weakness, light-headedness, numbness and headaches. Physical Exam  Constitutional:       General: He is not in acute distress. Appearance: Normal appearance. He is normal weight. He is not ill-appearing. HENT:      Head: Normocephalic and atraumatic. Right Ear: External ear normal.      Left Ear: External ear normal.      Nose: Nose normal.      Mouth/Throat:      Mouth: Mucous membranes are moist.      Pharynx: Oropharynx is clear. Eyes:      Extraocular Movements: Extraocular movements intact. Conjunctiva/sclera: Conjunctivae normal.      Pupils: Pupils are equal, round, and reactive to light. Cardiovascular:      Rate and Rhythm: Normal rate and regular rhythm. Heart sounds: Normal heart sounds. No murmur heard. No friction rub. No gallop. Pulmonary:      Effort: Pulmonary effort is normal. No respiratory distress. Breath sounds: Normal breath sounds. No wheezing, rhonchi or rales. Abdominal:      General: Abdomen is flat. There is no distension. Palpations: Abdomen is soft. Tenderness: There is no abdominal tenderness. There is no guarding or rebound. Musculoskeletal:         General: Signs of injury (finger covered in surgical dressing with minimal dried blood. otherwise clean and dry. ) present. No tenderness. Cervical back: Normal range of motion and neck supple. Right lower leg: No edema. Left lower leg: No edema. Skin:     General: Skin is warm and dry.       Capillary Refill: Capillary refill takes less than 2 seconds. Findings: No lesion. Neurological:      General: No focal deficit present. Mental Status: He is alert and oriented to person, place, and time. Psychiatric:         Mood and Affect: Mood normal.         Thought Content: Thought content normal.       LABS:    CBC:   Recent Labs     11/03/21 1630 11/04/21  0510 11/05/21  0614   WBC 4.0 4.6 5.6   HGB 12.9* 13.0* 11.9*   HCT 38.2* 40.0* 36.6*    182 182   .9* 106.2* 106.3*     Renal:    Recent Labs     11/03/21 0417 11/04/21  0510 11/05/21  0614   * 135* 135*   K 4.6 4.7 4.9   CL 93* 95* 97*   CO2 26 27 23   BUN 21* 18 26*   CREATININE 5.8* 4.9* 6.4*   GLUCOSE 89 84 100*   CALCIUM 9.4 8.9 9.1   MG 2.50* 2.20 2.30   PHOS 5.4* 5.3* 5.8*   ANIONGAP 13 13 15     Hepatic:   Recent Labs     11/03/21 0417 11/04/21  0510 11/05/21  0614   LABALBU 3.7 3.7 3.4     INR:   Recent Labs     11/03/21 0417 11/03/21  1630   INR 1.25* 1.22*     Lactate: No results for input(s): LACTATE in the last 72 hours. Cultures:  -----------------------------------------------------------------  RAD:   XR HAND LEFT (MIN 3 VIEWS)   Final Result      1. Abnormal lytic change affecting the distal phalanx of the fourth finger with overlying soft tissue swelling and ostial lysis consistent most likely with osteomyelitis of the finger with advanced lytic distal phalanx bone destruction      2. Advanced arthritic change affecting the base of the thumb at the first carpal-metacarpal articulation and advanced joint space loss at the first and second metacarpal phalangeal joint with subchondral erosions of the first and second metacarpal head                Assessment/Plan:   77year old male with am extensive past medical history significant for ESRD on HD, HTN, HFrEF, and Afib who presents w/ a swollen and painful left ring finger.     Osteomyelitis of the left 4th finger, distal phalange   Prior trauma of the finger after cutting his nail about 2 weeks ago. Significant edema and tenderness. Pt completed course of Augmentin as outpt   XR of the left hand showed OM of the finger, Elevated ESR and CRP  -BCx2 NGTD  -Abx: Vanc and Cefepime, renal dosing per pharm  -Ortho C/s'd for surgical mgmt, OR today per discussion w/ patient  -NPO since MN, Heparin gtt d/c'd at 4 AM for clearance  -Pain control: Dilaudid . 5 Q6H    Macrocytic anemia   H/H 13.1/40.7, .6  -B12, Folate WNL    Chronic Issues:  ESRD on HD (MWF)- Nephro c/s  HFrEF- Home Amiodarone & Torsemide (Patient refused today)  Essential HTN- Per above   Afib- Rate control w/ Amio, Will resume AC 24H after surgery to not impede wound healing  MDD- Home Sertaline (Patient refused today)    Code Status: FULL  FEN: Regular  PPX: SCDs  DISPO: [facility]    Jean Claude Zhao MD, PGY-1  11/05/21  11:02 AM    This patient has been staffed and discussed with Joy Melton MD.     Addendum to Resident H& P/Progress note:  I have personally seen,examined and evaluated the patient.  I have reviewed the current history, physical findings, labs and assessment and plan and agree with note as documented by resident MD ( Oleksandr Howard)      Joy Melton MD, Adelita Kelly

## 2021-11-05 NOTE — FLOWSHEET NOTE
Treatment time: 3.5 hours  Net UF: 0ml     Pre weight: 59.1 kg   Post weight: 59.1 kg  EDW: 66 kg     Access used: YASMANY AVF  Access function: Good with  ml/min     Medications or blood products given: Vancomycin     Regular outpatient schedule: DON Glass     Summary of response to treatment: Tolerated tx well. No HD related complaints or complications.           11/05/21 1448 11/05/21 1814   Treatment   Time On  --  1448   Time Off  --  1809   Vital Signs   /79 107/62   Temp 98.1 °F (36.7 °C) 97.5 °F (36.4 °C)   Pulse 80 72   Resp 16 18   Dialysis Bath   K+ (Potassium) 2  --    Ca+ (Calcium) 2.5  --    Na+ (Sodium) 138  --    HCO3 (Bicarb) 35  --      Copy of dialysis treatment record placed in chart, to be scanned into EMR.

## 2021-11-05 NOTE — PROGRESS NOTES
Patient is A&O x3.  RA, sat 96%. No complaints of SOB. Medicated for c/o left hand/finger pain as needed. Respirations appear to be easy and unlabored. Lungs clear. Respirations easy with no complaints of cough. Cardiac monitor in place, current rhythm NSR. Right FA PIV intact and flushed. Left hand dressing intact with no drainage noted. No complaints of nausea/vomiting/diarrhea. Up with assist/cane to the bathroom/BSC as needed. Tolerating regular diet. Plan of care and safety measures reviewed with the patient. Call light in reach and bed alarm in place. Will continue to monitor.   Electronically signed by Daniel Sullivan RN on 11/4/2021 at 11:04 PM

## 2021-11-05 NOTE — OP NOTE
[address], [address]                                OPERATIVE REPORT    PATIENT NAME: Ruma Martinez               :        1945  MED REC NO:   [id number]                          ROOM:       5510  ACCOUNT NO:   [de-identified]                           ADMIT DATE: 2021  PROVIDER:     Reilly Reddy MD    DATE OF PROCEDURE:  2021    PREOPERATIVE DIAGNOSES:  1. Left ring finger distal phalanx osteomyelitis with infection,  abscess, left ring finger. 2.  Flexor tenosynovitis, left ring finger. POSTOPERATIVE DIAGNOSES:  1. Left ring finger distal phalanx osteomyelitis with infection,  abscess, left ring finger. 2.  Flexor tenosynovitis, left ring finger. OPERATION AND PROCEDURES PERFORMED:  1. Revision amputation of left ring finger at the level of the distal  interphalangeal joint including neurectomies and primary closure. 2.  Incision and drainage of flexor tendon sheath, left ring finger. PRIMARY SURGEON:  Reilly Reddy MD    ASSISTANT:  [facility] surgical assistant. ANESTHESIA:  MAC and local.    ESTIMATED BLOOD LOSS:  10 mL. COMPLICATIONS:  None immediate apparent. SPECIMENS:  Abscess fluid from the left ring finger as well as bone from  distal phalanx and cultures sent for aerobic, anaerobic, and acid-fast  fungal.    IMPLANTS:  None. DRAINS:  Quarter-inch new gauze packing, left palm. BRIEF HISTORY:  The patient is a 42-year-old male with a history of  persistent pain and swelling in the left ring finger. Images were  obtained suggesting osteomyelitis involving the tip of the finger with  exam consistent with osteomyelitis and infection as well as possibility  of more proximal early flexor tenosynovitis. I had a discussion with  him regarding treatment options and in addition to antibiotic management  through IV.   I recommended operative intervention with the likelihood of  revision amputation because of the concern for osteomyelitis at the tip  of his finger. He was understanding and elected to proceed as well as  the planned for incision and drainage of that flexor tendon sheath  because of some of his exam findings. He understood these risks,  benefits, and alternatives have been discussed with the patient and he  elected to proceed. Consent was obtained prior to surgery. DESCRIPTION OF PROCEDURE:  The patient was seen in the preoperative  holding room by the operating surgeon. The operative site, the left  ring finger marked by the operating surgeon. Anesthesia also evaluated  the patient and felt the patient appropriate for MAC anesthesia. He was  taken back to the operating room and placed in supine position on a  regular table. All bony prominences were well padded. Weight-based  dose of IV antibiotics had been administered per schedule. Local  digital block of left upper extremity was performed as well. Prepped  and draped left upper extremity in usual sterile fashion after timeout  performed. We began the procedure by using a finger Tourni-Cot and  placed at the base of the left ring finger. We made an incision  curvilinear around the finger pulp in order to first evaluate the wound. Subcutaneous tissue appeared to be healthy and viable, but as soon as we  entered deeper structures, there was immediate purulent fluid that was  expressed. It was obvious immediately that there was significant  involvement of the bone with the bone having significant changes and  fragmentation with soft bone consistent with osteomyelitis of the distal  phalanx. It was at this point I decided to proceed with the plan for  revision amputation starting at the level of the distal phalanx,  therefore fishmouth incision was made at the distal aspect of the  finger.   With a 15-blade, we then dissected down through the dorsal  structures dividing the extensor tendon and amputating at the level of  the distal interphalangeal joint. Bone and fluid were sent for culture. At this point, we continued with our dissection carefully and also  transected the flexor tendons at this level to allow for retraction. Both the neurovascular bundles, ulnar and radial, were identified and  also were sharply divided 7 mm proximal to our bony resection level  final.  The amputated infected fingertip was then sent for specimen as  well as culture. The middle phalanx at this point and joint were then  denuded of cartilage at the condyles and the underlying bone appeared to  be healthy with no signs of obvious osteomyelitis or other abnormality  was noted. This was then smoothed with a rasp at this level. A rongeur  was used to fashion appropriately. At this point, the remaining tissue  in this area appeared to be clean, but there was some purulent fluid  still that appeared to be coming from the flexor tendon sheath. This  confirmed our suspicion and we did then remove the Tourni-Cot and I made  a separate incision at this point overlying the A1 pulley. An oblique  incision was made. We dissected down through the subcutaneous tissues  using clean instruments at this point. The A1 pulley was divided then  until the contents of the flexor tendon sheath were identified. We  protected the neurovascular bundles as we carefully dissected down. There was no obvious purulence at the A1 pulley level or at the tendon  sheath and we also tried to express any purulence from the palm, but  this also was not evident. I felt that the extent of the infection was  likely an early flexor tenosynovitis that extended down to the middle  phalanx segment, but did not involve the bone.   Therefore, we performed  a copious irrigation of the flexor tendon sheath using an angiocatheter  and normal saline irrigation from proximal to distal.  This allowed us  to flush out any fluid remaining in the flexor tendon sheath that was  infected and we did this until there was efflux of clear fluid coming  from the amputation site and the distal portion of the flexor tendon  sheath. Both of the wounds were then copiously also irrigated with  normal saline irrigation. The remaining tissue again remained healthy. There were no signs of purulence or fluctuance throughout the remainder  of the finger. The Tourni-Cot had been removed and bleeding was well  controlled. Loose suturing of the palmar wound was then performed with  4-0 nylon suture. We then performed a cosmetic closure of the distal  wound as well using loose 4-0 nylon suture. The skin flaps had been  fashioned and they appeared to be viable and with some healthy bleeding  of the tissue. At this point, the packing was placed at the palmar  wound using quarter-inch Nu gauze packing. Sterile dressing and  overwrap were then applied and an Alumafoam splint to the fingertip. The patient tolerated procedure well, was awoken, taken to PACU in  stable condition. POSTOPERATIVE PLAN:  We will follow up on intraoperative cultures. Continue with empiric antibiotics for now. Keep clean, dry, and intact. We will plan for wound check and packing removal likely in the next  24-48 hours.         Enzo Alonso MD    D: 11/04/2021 19:12:51       T: 11/04/2021 19:15:48     [email or web address]  Job#: [id number]     Doc#: [id number]    CC:

## 2021-11-06 PROCEDURE — 99232 SBSQ HOSP IP/OBS MODERATE 35: CPT | Performed by: HOSPITALIST

## 2021-11-06 PROCEDURE — 6370000000 HC RX 637 (ALT 250 FOR IP): Performed by: ORTHOPAEDIC SURGERY

## 2021-11-06 PROCEDURE — 1200000000 HC SEMI PRIVATE

## 2021-11-06 PROCEDURE — 6360000002 HC RX W HCPCS: Performed by: STUDENT IN AN ORGANIZED HEALTH CARE EDUCATION/TRAINING PROGRAM

## 2021-11-06 PROCEDURE — 2580000003 HC RX 258: Performed by: ORTHOPAEDIC SURGERY

## 2021-11-06 PROCEDURE — 6370000000 HC RX 637 (ALT 250 FOR IP): Performed by: STUDENT IN AN ORGANIZED HEALTH CARE EDUCATION/TRAINING PROGRAM

## 2021-11-06 RX ADMIN — APIXABAN 2.5 MG: 2.5 TABLET, FILM COATED ORAL at 20:52

## 2021-11-06 RX ADMIN — TORSEMIDE 20 MG: 20 TABLET ORAL at 17:00

## 2021-11-06 RX ADMIN — SODIUM CHLORIDE, PRESERVATIVE FREE 10 ML: 5 INJECTION INTRAVENOUS at 20:52

## 2021-11-06 RX ADMIN — TORSEMIDE 20 MG: 20 TABLET ORAL at 09:08

## 2021-11-06 RX ADMIN — SERTRALINE HYDROCHLORIDE 50 MG: 50 TABLET ORAL at 09:07

## 2021-11-06 RX ADMIN — PANTOPRAZOLE SODIUM 40 MG: 40 TABLET, DELAYED RELEASE ORAL at 06:12

## 2021-11-06 RX ADMIN — SODIUM CHLORIDE, PRESERVATIVE FREE 10 ML: 5 INJECTION INTRAVENOUS at 10:09

## 2021-11-06 RX ADMIN — AMIODARONE HYDROCHLORIDE 200 MG: 200 TABLET ORAL at 20:52

## 2021-11-06 RX ADMIN — HYDROMORPHONE HYDROCHLORIDE 1 MG: 1 INJECTION, SOLUTION INTRAMUSCULAR; INTRAVENOUS; SUBCUTANEOUS at 15:10

## 2021-11-06 RX ADMIN — APIXABAN 2.5 MG: 2.5 TABLET, FILM COATED ORAL at 16:59

## 2021-11-06 RX ADMIN — HYDROMORPHONE HYDROCHLORIDE 1 MG: 1 INJECTION, SOLUTION INTRAMUSCULAR; INTRAVENOUS; SUBCUTANEOUS at 18:38

## 2021-11-06 ASSESSMENT — PAIN SCALES - GENERAL
PAINLEVEL_OUTOF10: 1
PAINLEVEL_OUTOF10: 8
PAINLEVEL_OUTOF10: 8
PAINLEVEL_OUTOF10: 1

## 2021-11-06 ASSESSMENT — PAIN - FUNCTIONAL ASSESSMENT: PAIN_FUNCTIONAL_ASSESSMENT: ACTIVITIES ARE NOT PREVENTED

## 2021-11-06 NOTE — PROGRESS NOTES
Orthopedic Surgery   Progress Note      SUBJECTIVE:  Patient currently stable, no new pain. Cultures Staphylococcus lugdunensis Abnormal  P   No additional events, spouse at bedside    OBJECTIVE:  /85   Pulse 81   Temp 98.2 °F (36.8 °C) (Oral)   Resp 16   Ht 5' 6\" (1.676 m)   Wt 130 lb 4.7 oz (59.1 kg)   SpO2 94%   BMI 21.03 kg/m²   General: pleasant, well appearing, no acute distress      Left hand/left ring finger  Dressing removed  Wound clean with sutures in place at fingertip and palm  Well perfused fingertip and skin flaps appear viable  No purulence, no fluctuance  Packing removed from palmar wound  Active motion comfortable of all other fingers no new swelling  No proximal swelling or erythema  Cap refill brisk all fingers with gross sensation intact median/ulnar/radial nerves    CBC:   Lab Results   Component Value Date    WBC 5.6 11/05/2021    RBC 3.44 11/05/2021    HGB 11.9 11/05/2021    HCT 36.6 11/05/2021    .3 11/05/2021    MCH 34.4 11/05/2021    MCHC 32.4 11/05/2021    RDW 17.3 11/05/2021     11/05/2021    MPV 7.9 11/05/2021     Hemoglobin/Hematocrit:    Lab Results   Component Value Date    HGB 11.9 11/05/2021    HCT 36.6 11/05/2021     PT/INR:    Lab Results   Component Value Date    PROTIME 13.9 11/03/2021    INR 1.22 11/03/2021     Chem Basic:   Lab Results   Component Value Date     11/05/2021    K 4.9 11/05/2021    K 4.5 11/02/2021    CL 97 11/05/2021    CO2 23 11/05/2021    GLUCOSE 100 11/05/2021    BUN 26 11/05/2021    CREATININE 6.4 11/05/2021           ASSESSMENT AND PLAN: 78 y/o M s/p revision amputation for osteomyelitis/infection left ring finger, I and D flexor tendon sheath 11/4/2021    1. Continue antibiotics, cultures demonstrating Staph species, likely will need oral course 10-14 days s/p discharge  2. Dressing changed today. Plan for continued dry dressing changes q1-2 days.    Ok to leave open and exposed to air intermittently  Ok to start gentle hand hygeine    4.  Continue elevation LUE  Finger ROM as tolerated    OK for discharge from Orthopaedic standpoint, plan for followup in 7-10 days clinic for wound check and suture removal        Latrice Gentile MD  11/6/2021  3:24 PM

## 2021-11-06 NOTE — PLAN OF CARE
Problem: Pain:  Goal: Pain level will decrease  Description: Pain level will decrease  Outcome: Ongoing   RN assesses pain using 0-10 scale. Patient understands how to rate pain using 0-10 scale. Pain is controled with medication per MAR. RN encourages patient to call out for breakthrough pain. Will continue to monitor and reassess. Problem: Falls - Risk of:  Goal: Will remain free from falls  Description: Will remain free from falls  Outcome: Ongoing  Patient remains free from falls during this shift. Bed is in the lowest position and the bed alarm is activated. Anti-slip socks are on. Call light is within reach. Will continue to monitor and reassess.

## 2021-11-06 NOTE — PROGRESS NOTES
Progress Note    Admit Date: 11/2/2021  Day:   Diet: ADULT DIET; Regular; Low Sodium (2 gm); Low Potassium (Less than 3000 mg/day); 1500 ml    CC:    Interval history: NAEO. Patient  underwent dialysis yesterday. He is POD #2 for digit removal. Remains on Vanc and cefepime. He currently has no complaints, wanting to go home        Medications:     Scheduled Meds:   cefepime  2,000 mg IntraVENous Q MWF    amiodarone  200 mg Oral Nightly    pantoprazole  40 mg Oral QAM AC    sertraline  50 mg Oral Daily    torsemide  20 mg Oral BID    sodium chloride flush  5-40 mL IntraVENous 2 times per day    vancomycin (VANCOCIN) intermittent dosing (placeholder)   Other See Admin Instructions     Continuous Infusions:   sodium chloride       PRN Meds:HYDROmorphone, sodium chloride flush, sodium chloride, ondansetron **OR** ondansetron, polyethylene glycol, acetaminophen **OR** acetaminophen    Objective:   Vitals:   T-max:  Patient Vitals for the past 8 hrs:   BP Temp Temp src Pulse Resp SpO2   11/06/21 0645 117/76 97.7 °F (36.5 °C) Oral 81 16 93 %   11/06/21 0300 108/71 97.9 °F (36.6 °C) Oral 80 16 94 %       Intake/Output Summary (Last 24 hours) at 11/6/2021 0834  Last data filed at 11/6/2021 0354  Gross per 24 hour   Intake 1590 ml   Output 1150 ml   Net 440 ml       Physical Exam  HENT:      Head: Normocephalic and atraumatic. Eyes:      Extraocular Movements: Extraocular movements intact. Conjunctiva/sclera: Conjunctivae normal.      Pupils: Pupils are equal, round, and reactive to light. Cardiovascular:      Rate and Rhythm: Normal rate and regular rhythm. Pulses: Normal pulses. Heart sounds: Normal heart sounds. No murmur heard. No gallop. Pulmonary:      Effort: Pulmonary effort is normal. No respiratory distress. Breath sounds: Normal breath sounds. Abdominal:      General: Abdomen is flat. Bowel sounds are normal. There is no distension. Palpations: Abdomen is soft. Musculoskeletal:      Right lower leg: No edema. Left lower leg: No edema. Skin:     General: Skin is warm and dry. Neurological:      General: No focal deficit present. Mental Status: He is oriented to person, place, and time. Mental status is at baseline. Cranial Nerves: No cranial nerve deficit. LABS:    CBC:   Recent Labs     11/03/21  1630 11/04/21  0510 11/05/21 0614   WBC 4.0 4.6 5.6   HGB 12.9* 13.0* 11.9*   HCT 38.2* 40.0* 36.6*    182 182   .9* 106.2* 106.3*     Renal:    Recent Labs     11/04/21  0510 11/05/21  0614   * 135*   K 4.7 4.9   CL 95* 97*   CO2 27 23   BUN 18 26*   CREATININE 4.9* 6.4*   GLUCOSE 84 100*   CALCIUM 8.9 9.1   MG 2.20 2.30   PHOS 5.3* 5.8*   ANIONGAP 13 15     Hepatic:   Recent Labs     11/04/21  0510 11/05/21  0614   LABALBU 3.7 3.4     Troponin: No results for input(s): TROPONINI in the last 72 hours. BNP: No results for input(s): BNP in the last 72 hours. Lipids: No results for input(s): CHOL, HDL in the last 72 hours. Invalid input(s): LDLCALCU, TRIGLYCERIDE  ABGs:  No results for input(s): PHART, HTH3CTV, PO2ART, PMA2LIA, BEART, THGBART, O1LMFRLO, HYL6SRP in the last 72 hours. INR:   Recent Labs     11/03/21  1630   INR 1.22*     Lactate: No results for input(s): LACTATE in the last 72 hours. Cultures:  -----------------------------------------------------------------  RAD:   XR HAND LEFT (MIN 3 VIEWS)   Final Result      1. Abnormal lytic change affecting the distal phalanx of the fourth finger with overlying soft tissue swelling and ostial lysis consistent most likely with osteomyelitis of the finger with advanced lytic distal phalanx bone destruction      2.  Advanced arthritic change affecting the base of the thumb at the first carpal-metacarpal articulation and advanced joint space loss at the first and second metacarpal phalangeal joint with subchondral erosions of the first and second metacarpal head Assessment/Plan:   77year old male with am extensive past medical history significant for ESRD on HD, HTN, HFrEF, and Afib who presents w/ a swollen and painful left ring finger.     Osteomyelitis of the left 4th finger, distal phalange   Prior trauma of the finger after cutting his nail about 2 weeks ago. Significant edema and tenderness. Pt completed course of Augmentin as outpt   XR of the left hand showed OM of the finger, Elevated ESR and CRP  -BCx2 NGTD  -Abx: Vanc and Cefepime, renal dosing per pharm  -Ortho C/s'd for surgical mgmt,  -NPO since MN, Heparin gtt d/c'd at 4 AM for clearance  -Pain control: Dilaudid . 5 Q6H  -Wound cultures growing staph lugdunensis     Macrocytic anemia   H/H 13.1/40.7, .6  -B12, Folate WNL     Chronic Issues:  ESRD on HD (MWF)- Nephro c/s  HFrEF- Home Amiodarone & Torsemide (Patient refused today)  Essential HTN- Per above   Afib- Rate control w/ Amio, restarted eliquis   MDD- Home Sertaline (Patient refused today)     Code Status: FULL  FEN: Regular  PPX: SCDs  DISPO: [facility] hopefully home         Sara Mason MD, PGY-3  11/06/21  8:34 AM    This patient has been staffed and discussed with Seth Delgado MD.     Addendum to Resident H& P/Progress note:  I have personally seen,examined and evaluated the patient.  I have reviewed the current history, physical findings, labs and assessment and plan and agree with note as documented by resident MD ( Mirna Pino)      Seth Delgado MD, [address]

## 2021-11-06 NOTE — PROGRESS NOTES
Patient is alert and oriented. Vital signs are stable. Patient's pain is controlled with medication per MAR. Old drainage on dressing. Bed is in the lowest position. Bed alarm is activated. Call light is within reach. Will continue to monitor and reassess.

## 2021-11-06 NOTE — PROGRESS NOTES
Clinical Pharmacy Progress Note    Vancomycin - Management by Pharmacy    Consult Date(s): 11/2/2021  Consulting Provider(s): Dr. Mendoza Stands / Plan    1) Osteomyelitis of L 4th phalange - Vancomycin + Cefepime   Concurrent Antimicrobials:  Cefepime - Day #4   Day of Vanc Therapy: #5   Current Dosing Method: Intermittent Dosing by Levels   Therapeutic Goal: Trough = 15-20 mcg/mL   Current Dose / Plan / Rationale:   o Given ESRD on HD, will dose vancomycin based on intermittent levels with HD.  o Pt received Vancomycin 1000mg IV x1 after HD yesterday. No plans for HD this weekend - levels will be expected to remain therapeutic through the weekend without requiring Vanc doses. o Will plan to check next level prior to HD Monday 11/8.  Will continue to monitor clinical condition and make adjustments to regimen as appropriate. · Surgical cultures growing Staph lugdenesis, which is typically Oxacillin sensitive. Could consider de-escalating to Cefazolin 2000mg IV qMWF with HD pending final culture results. Please call with questions--  Thanks--  Pino Shah, PharmD, BCPS, BCGP  [id number] ([facility] [place])   11/6/2021 9:15 AM        Interval update:   Surgical cultures growing Staph lugdenesis. Subjective/Objective: Mr. Salazar Wilson is a 77 y.o. male with a PMHx significant for ESRD on HD, HTN, HFrEF, AFib who presented to ED 11/2 with 1 week history of pain and swollen L 4th finger. Pt presented to PCP on 10/21 and was started on 10-day course of Augmentin. In ED 11/2, XRay of L hand was concerning for osteomyelitis. .  Pt is s/p revision amputation of left ring finger with I&D of flexor tendon sheath (done 11/4). Pharmacy has been consulted to dose vancomycin.     Pertinent Antimicrobials:  Cefepime 2000mg IV qMWF with HD (11/3-current)  Vancomycin - pharmacy to dose (11/2-current)   Intermittent dosing (11/2-current)    Date Vancomycin Level Vancomycin Dose   11/2  1000mg 11/3 13.2mcg/mL 1000mg   11/4 18.3mcg/mL --   11/5 15.9mcg/mL 1000mg   11/6  --            Ht Readings from Last 1 Encounters:   11/02/21 5' 6\" (1.676 m)       Wt Readings from Last 1 Encounters:   11/05/21 130 lb 4.7 oz (59.1 kg)     Cultures & Sensitivities:    Date Site Micro Susceptibility / Result   11/3 Blood NGTD    11/4 L ring finger Staph lugednesis In process     Labs / Ancillary Data:    Est CrCl not calculated d/t ESRD on HD    Recent Labs     11/03/21  1630 11/04/21  0510 11/05/21  0614   CREATININE  --  4.9* 6.4*   BUN  --  18 26*   WBC 4.0 4.6 5.6

## 2021-11-06 NOTE — CARE COORDINATION
SW Following, AUGUSTINE submitted a referral w/[employer] for [address]. SW also requested [address] orders.     Electronically signed by JAN Fox, YOAN on 11/6/2021 at 3:46 PM   [phone]

## 2021-11-07 VITALS
HEIGHT: 66 IN | DIASTOLIC BLOOD PRESSURE: 81 MMHG | WEIGHT: 130.29 LBS | SYSTOLIC BLOOD PRESSURE: 112 MMHG | RESPIRATION RATE: 16 BRPM | OXYGEN SATURATION: 93 % | BODY MASS INDEX: 20.94 KG/M2 | HEART RATE: 83 BPM | TEMPERATURE: 98.3 F

## 2021-11-07 LAB
ALBUMIN SERPL-MCNC: 3.7 G/DL (ref 3.4–5)
ANION GAP SERPL CALCULATED.3IONS-SCNC: 15 MMOL/L (ref 3–16)
BASOPHILS ABSOLUTE: 0 K/UL (ref 0–0.2)
BASOPHILS RELATIVE PERCENT: 0.9 %
BLOOD CULTURE, ROUTINE: NORMAL
BUN BLDV-MCNC: 29 MG/DL (ref 7–20)
CALCIUM SERPL-MCNC: 9.2 MG/DL (ref 8.3–10.6)
CHLORIDE BLD-SCNC: 95 MMOL/L (ref 99–110)
CO2: 25 MMOL/L (ref 21–32)
CREAT SERPL-MCNC: 6.1 MG/DL (ref 0.8–1.3)
CULTURE, BLOOD 2: NORMAL
EOSINOPHILS ABSOLUTE: 0.1 K/UL (ref 0–0.6)
EOSINOPHILS RELATIVE PERCENT: 3.3 %
GFR AFRICAN AMERICAN: 11
GFR NON-AFRICAN AMERICAN: 9
GLUCOSE BLD-MCNC: 87 MG/DL (ref 70–99)
HCT VFR BLD CALC: 39.2 % (ref 40.5–52.5)
HEMOGLOBIN: 12.6 G/DL (ref 13.5–17.5)
LYMPHOCYTES ABSOLUTE: 0.8 K/UL (ref 1–5.1)
LYMPHOCYTES RELATIVE PERCENT: 19.8 %
MAGNESIUM: 2.2 MG/DL (ref 1.8–2.4)
MCH RBC QN AUTO: 34.5 PG (ref 26–34)
MCHC RBC AUTO-ENTMCNC: 32.1 G/DL (ref 31–36)
MCV RBC AUTO: 107.5 FL (ref 80–100)
MONOCYTES ABSOLUTE: 0.6 K/UL (ref 0–1.3)
MONOCYTES RELATIVE PERCENT: 16.1 %
NEUTROPHILS ABSOLUTE: 2.3 K/UL (ref 1.7–7.7)
NEUTROPHILS RELATIVE PERCENT: 59.9 %
PDW BLD-RTO: 17.2 % (ref 12.4–15.4)
PHOSPHORUS: 6.7 MG/DL (ref 2.5–4.9)
PLATELET # BLD: 169 K/UL (ref 135–450)
PMV BLD AUTO: 8.3 FL (ref 5–10.5)
POTASSIUM SERPL-SCNC: 5.3 MMOL/L (ref 3.5–5.1)
RBC # BLD: 3.65 M/UL (ref 4.2–5.9)
SODIUM BLD-SCNC: 135 MMOL/L (ref 136–145)
WBC # BLD: 3.9 K/UL (ref 4–11)

## 2021-11-07 PROCEDURE — 6370000000 HC RX 637 (ALT 250 FOR IP): Performed by: ORTHOPAEDIC SURGERY

## 2021-11-07 PROCEDURE — 80069 RENAL FUNCTION PANEL: CPT

## 2021-11-07 PROCEDURE — 83735 ASSAY OF MAGNESIUM: CPT

## 2021-11-07 PROCEDURE — 2580000003 HC RX 258: Performed by: ORTHOPAEDIC SURGERY

## 2021-11-07 PROCEDURE — 36415 COLL VENOUS BLD VENIPUNCTURE: CPT

## 2021-11-07 PROCEDURE — 99238 HOSP IP/OBS DSCHRG MGMT 30/<: CPT | Performed by: HOSPITALIST

## 2021-11-07 PROCEDURE — 85025 COMPLETE CBC W/AUTO DIFF WBC: CPT

## 2021-11-07 PROCEDURE — 6370000000 HC RX 637 (ALT 250 FOR IP): Performed by: STUDENT IN AN ORGANIZED HEALTH CARE EDUCATION/TRAINING PROGRAM

## 2021-11-07 RX ORDER — HYDROCODONE BITARTRATE AND ACETAMINOPHEN 5; 325 MG/1; MG/1
1 TABLET ORAL EVERY 6 HOURS PRN
Qty: 28 TABLET | Refills: 0 | Status: SHIPPED | OUTPATIENT
Start: 2021-11-07 | End: 2022-04-07 | Stop reason: SDUPTHER

## 2021-11-07 RX ORDER — HYDROCODONE BITARTRATE AND ACETAMINOPHEN 5; 325 MG/1; MG/1
1 TABLET ORAL EVERY 6 HOURS PRN
Qty: 28 TABLET | Refills: 0 | Status: SHIPPED | OUTPATIENT
Start: 2021-11-07 | End: 2021-11-07 | Stop reason: SDUPTHER

## 2021-11-07 RX ORDER — SULFAMETHOXAZOLE AND TRIMETHOPRIM 800; 160 MG/1; MG/1
1 TABLET ORAL ONCE
Qty: 20 TABLET | Refills: 0 | Status: SHIPPED | OUTPATIENT
Start: 2021-11-07 | End: 2021-11-07 | Stop reason: SDUPTHER

## 2021-11-07 RX ORDER — SULFAMETHOXAZOLE AND TRIMETHOPRIM 800; 160 MG/1; MG/1
1 TABLET ORAL DAILY
Qty: 10 TABLET | Refills: 0 | Status: CANCELLED | OUTPATIENT
Start: 2021-11-07 | End: 2021-11-17

## 2021-11-07 RX ORDER — HYDROCODONE BITARTRATE AND ACETAMINOPHEN 5; 325 MG/1; MG/1
1 TABLET ORAL 2 TIMES DAILY PRN
Qty: 60 TABLET | Refills: 0 | Status: CANCELLED | OUTPATIENT
Start: 2021-11-07 | End: 2021-11-12

## 2021-11-07 RX ORDER — SULFAMETHOXAZOLE AND TRIMETHOPRIM 800; 160 MG/1; MG/1
1 TABLET ORAL DAILY
Qty: 10 TABLET | Refills: 0 | Status: SHIPPED | OUTPATIENT
Start: 2021-11-07 | End: 2021-11-17

## 2021-11-07 RX ADMIN — APIXABAN 2.5 MG: 2.5 TABLET, FILM COATED ORAL at 08:56

## 2021-11-07 RX ADMIN — SERTRALINE HYDROCHLORIDE 50 MG: 50 TABLET ORAL at 08:56

## 2021-11-07 RX ADMIN — TORSEMIDE 20 MG: 20 TABLET ORAL at 08:55

## 2021-11-07 RX ADMIN — PANTOPRAZOLE SODIUM 40 MG: 40 TABLET, DELAYED RELEASE ORAL at 06:24

## 2021-11-07 RX ADMIN — SODIUM CHLORIDE, PRESERVATIVE FREE 10 ML: 5 INJECTION INTRAVENOUS at 08:57

## 2021-11-07 NOTE — PLAN OF CARE
Problem: Pain:  Goal: Control of acute pain  Description: Control of acute pain  Outcome: Met This Shift     Pt states it is minimal and declines intervention at this time. Will reassess with vital signs and educated pt to call out if pain is greater than 4/10. Problem: Falls - Risk of:  Goal: Will remain free from falls  Description: Will remain free from falls  Outcome: Met This Shift   Pt free from injury this shift and free of falls. 2/4 rails up on bed and bed is in the lowest position. Wheels locked and bed alarm set. Socks on pt and ID bands on pt. Call light in reach of pt and pt educated to call out to get up. Will continue to monitor for safety. Chair alarm on while pt up to chair also. Dycem and humphrey under pt.

## 2021-11-07 NOTE — CARE COORDINATION
Case Management Assessment            Discharge Note                    Date / Time of Note: 11/7/2021 10:33 AM                  Discharge Note Completed by: JAN Fox, [facility]    Patient Name: Esther Alonso. YOB: 1945  Diagnosis: Osteomyelitis ([address] [M86.9]  ESRD on dialysis ([address] [N18.6, Z99.2]  Felon of finger of left hand with lymphangitis [L03.022]   Date / Time: 11/2/2021  6:01 PM    Current PCP: Charleen Carballo MD  Clinic patient: Yes    Hospitalization in the last 30 days: No    Advance Directives:  Code Status: Full Code  [place] DNR form completed and on chart: No    Financial:  Payor: Nagi Zamora / Plan: Jennifer Christian PPO / Product Type: Medicare /      Pharmacy:    [facility] [address], [address] [address]  Phone: [phone] Fax: [phone]      Assistance purchasing medications?: Potential Assistance Purchasing Medications: No  Assistance provided by Case Management: None at this time    Does patient want to participate in local refill/ meds to beds program?: Not Assessed    Meds To Beds General Rules:  1. Can ONLY be done Monday- Friday between 8:30am-5pm  2. Prescription(s) must be in pharmacy by 3pm to be filled same day  3. Copy of patient's insurance/ prescription drug card and patient face sheet must be sent along with the prescription(s)  4. Cost of Rx cannot be added to hospital bill. If financial assistance is needed, please contact unit  or ;  or  CANNOT provide pharmacy voucher for patients co-pays  5.  Patients can then  the prescription on their way out of the hospital at discharge, or pharmacy can deliver to the bedside if staff is available. (payment due at time of pick-up or delivery - cash, check, or card accepted)     Able to afford home medications/ co-pay costs: Yes    ADLS:  Current PT

## 2021-11-07 NOTE — PROGRESS NOTES
Discharge instructions given to patient and wife. No questions at this time. IV removed without complications. Prescriptions given to patients wife.  Dressing changed before leaving

## 2021-11-07 NOTE — PLAN OF CARE
Problem: Pain:  Goal: Pain level will decrease  Description: Pain level will decrease  Outcome: Completed  Goal: Control of acute pain  Description: Control of acute pain  11/7/2021 0955 by Dary Gaston RN  Outcome: Completed  11/7/2021 0119 by Bladimir Hodges RN  Outcome: Met This Shift  11/7/2021 0119 by Bladimir Hodges RN  Outcome: Met This Shift  Goal: Control of chronic pain  Description: Control of chronic pain  Outcome: Completed     Problem: Falls - Risk of:  Goal: Will remain free from falls  Description: Will remain free from falls  11/7/2021 0955 by Dary Gaston RN  Outcome: Completed  11/7/2021 0119 by Bladimir Hodges RN  Outcome: Met This Shift  11/7/2021 0119 by Bladimir Hodges RN  Outcome: Met This Shift  Goal: Absence of physical injury  Description: Absence of physical injury  Outcome: Completed     Problem: Discharge Planning:  Goal: Discharged to appropriate level of care  Description: Discharged to appropriate level of care  Outcome: Completed     Problem:  Body Temperature - Imbalanced:  Goal: Ability to maintain a body temperature in the normal range will improve  Description: Ability to maintain a body temperature in the normal range will improve  Outcome: Completed     Problem: Mobility - Impaired:  Goal: Mobility will improve to maximum level  Description: Mobility will improve to maximum level  Outcome: Completed     Problem: Pain:  Goal: Pain level will decrease  Description: Pain level will decrease  Outcome: Completed  Goal: Control of acute pain  Description: Control of acute pain  Outcome: Completed  Goal: Control of chronic pain  Description: Control of chronic pain  Outcome: Completed     Problem: Skin Integrity - Impaired:  Goal: Will show no infection signs and symptoms  Description: Will show no infection signs and symptoms  Outcome: Completed  Goal: Absence of new skin breakdown  Description: Absence of new skin breakdown  Outcome: Completed     Problem: OXYGENATION/RESPIRATORY FUNCTION  Goal: Patient will maintain patent airway  Outcome: Completed  Goal: Patient will achieve/maintain normal respiratory rate/effort  Description: Respiratory rate and effort will be within normal limits for the patient  Outcome: Completed     Problem: HEMODYNAMIC STATUS  Goal: Patient has stable vital signs and fluid balance  Outcome: Completed     Problem: FLUID AND ELECTROLYTE IMBALANCE  Goal: Fluid and electrolyte balance are achieved/maintained  Outcome: Completed     Problem: ACTIVITY INTOLERANCE/IMPAIRED MOBILITY  Goal: Mobility/activity is maintained at optimum level for patient  Outcome: Completed

## 2021-11-07 NOTE — PROGRESS NOTES
VSS. Denies pain thus far overnight. Will continue to reassess pain. Ace wrap CDI. Pt up to chair. Bed alarm set. Call light in reach. Will continue to monitor.

## 2021-11-08 ENCOUNTER — CARE COORDINATION (OUTPATIENT)
Dept: CASE MANAGEMENT | Age: 76
End: 2021-11-08

## 2021-11-08 LAB — HEPATITIS B CORE TOTAL ANTIBODY: NEGATIVE

## 2021-11-08 NOTE — CARE COORDINATION
[facility] Transitions Initial Follow Up Call    Call within 2 business days of discharge: Yes    Patient: Merna Del Rio Patient : 1945   MRN: <[id number]>  Reason for Admission:   Discharge Date: 21 RARS: Readmission Risk Score: 15.4 ( )      Last Discharge [facility]       Complaint Diagnosis Description Type Department Provider    21 Hand Pain Felon of finger of left hand with lymphangitis . .. ED to Hosp-Admission (Discharged) (ADMITTED) [address] Robert Iqbal MD; Darline Lopez,. .. Attempted to contact patient for follow up  transition call. Left voicemail message to return call with an update on condition since discharge. Contact information provided. Will continue to follow up.           Care Transitions 24 Hour Call    Care Transitions Interventions         Follow Up  Future Appointments   Date Time Provider Paige Harrell   2022 10:00 AM Kai Savage MD [address] [facility]

## 2021-11-09 ENCOUNTER — CARE COORDINATION (OUTPATIENT)
Dept: CASE MANAGEMENT | Age: 76
End: 2021-11-09

## 2021-11-09 LAB
ANAEROBIC CULTURE: ABNORMAL
ANAEROBIC CULTURE: ABNORMAL
CULTURE SURGICAL: ABNORMAL
GRAM STAIN RESULT: ABNORMAL
GRAM STAIN RESULT: ABNORMAL
ORGANISM: ABNORMAL
ORGANISM: ABNORMAL
WOUND/ABSCESS: ABNORMAL

## 2021-11-09 NOTE — CARE COORDINATION
[address] [facility] Transitions Initial Follow Up Call    Call within 2 business days of discharge: Yes    Patient:  Macho Gutierrez. Patient :  1945  MRN:  [id number]     Reason for Admission:  OSTEOMYELITIS   Discharge Date:  21    RARS: 15      Non-face-to-face services provided:  Communication with home health agencies or other community services the patient is currently using-      CTN spoke with Jessica with [facility] who confirms [facility] order has been received for the Pt. CTN spoke with Pt's spouse, Deysi Amezcua, who states the Pt's dressing to his hand keeps coming off and he will not allow her to replace it. She states she received a call from [facility] and was advised she would receive a call from the nurse to setup an appt but has not heard anything yet. CTN provided her HC number and CTN will f/u with Crys to see when appt can be scheduled for dressing change. Wife states she is at work and unable to speak any further and Pt can be reached on mobile number. CTN called HC back and left a message with Frank Sharp with [facility] to see when Pt can be schedule for home visit with dressing change. CTC attempt to reach Pt regarding recent hospital discharge. CTC left voice recording with call back number requesting a call back on Pt's mobile number. CTN waiting for call back. Follow up appointments:    Future Appointments   Date Time Provider Paige Harrell   2022 10:00 AM Samanta Rosa MD [facility] [address] [place] - DYD       AURORA LeiN, RN  Care Transition Coordinator  Contact Number:  [phone]

## 2021-11-11 ENCOUNTER — TELEPHONE (OUTPATIENT)
Dept: INTERNAL MEDICINE CLINIC | Age: 76
End: 2021-11-11

## 2021-11-11 RX ORDER — PANTOPRAZOLE SODIUM 40 MG/1
40 TABLET, DELAYED RELEASE ORAL DAILY
Qty: 30 TABLET | Refills: 0 | Status: CANCELLED | OUTPATIENT
Start: 2021-11-11

## 2021-11-11 NOTE — TELEPHONE ENCOUNTER
Paradise Ch from [address] called in wanting to discuss the patients medications after his recent hospitalization. Pls call and advise.

## 2021-11-11 NOTE — TELEPHONE ENCOUNTER
He gets these meds from his kidney care team and not from me. He should talk to them at his dialysis.

## 2021-11-11 NOTE — TELEPHONE ENCOUNTER
Wants pantoprazole (PROTONIX) and torsemide (DEMADEX) refilled. Says he never has had GOUT so wants to know why he was prescribes allopurinol?     Wants prescription sent to [address] [place] [address] Rd  [address] [facility], [address] [place], [place] [address]in [facility] [address]

## 2021-11-12 NOTE — TELEPHONE ENCOUNTER
Pamela rahman. Wants to know if the medications you prescribe for him, if you could send them into the [address] [place] [address]?

## 2021-12-06 LAB
FUNGUS (MYCOLOGY) CULTURE: NORMAL
FUNGUS (MYCOLOGY) CULTURE: NORMAL
FUNGUS STAIN: NORMAL
FUNGUS STAIN: NORMAL

## 2021-12-08 ENCOUNTER — TELEPHONE (OUTPATIENT)
Dept: INTERNAL MEDICINE CLINIC | Age: 76
End: 2021-12-08

## 2021-12-08 DIAGNOSIS — F33.1 MODERATE EPISODE OF RECURRENT MAJOR DEPRESSIVE DISORDER (HCC): ICD-10-CM

## 2021-12-08 NOTE — TELEPHONE ENCOUNTER
Orders Placed This Encounter   Medications    sertraline (ZOLOFT) 50 MG tablet     Sig: TAKE ONE TABLET BY MOUTH DAILY.  DISCONTINUE FLUOXETINE     Dispense:  90 tablet     Refill:  4

## 2021-12-08 NOTE — TELEPHONE ENCOUNTER
Pharmacy called into the office to request a refill for the following medication. sertraline (ZOLOFT) 50 MG tablet     [facility] and [address] [place] [address], [facility] - f [phone]    Please advise.

## 2021-12-21 LAB
AFB CULTURE (MYCOBACTERIA): NORMAL
AFB CULTURE (MYCOBACTERIA): NORMAL
AFB SMEAR: NORMAL
AFB SMEAR: NORMAL

## 2022-01-03 ENCOUNTER — TELEPHONE (OUTPATIENT)
Dept: INTERNAL MEDICINE CLINIC | Age: 77
End: 2022-01-03

## 2022-01-03 DIAGNOSIS — M15.9 PRIMARY OSTEOARTHRITIS INVOLVING MULTIPLE JOINTS: ICD-10-CM

## 2022-01-03 DIAGNOSIS — I50.22 CHRONIC SYSTOLIC CONGESTIVE HEART FAILURE (HCC): ICD-10-CM

## 2022-01-03 DIAGNOSIS — I50.22 CHRONIC LEFT SYSTOLIC HEART FAILURE (HCC): Primary | ICD-10-CM

## 2022-01-03 NOTE — TELEPHONE ENCOUNTER
Need a Rx for a rolling walker   Fax # [phone] [facility]         Please advise

## 2022-01-14 ENCOUNTER — TELEPHONE (OUTPATIENT)
Dept: INTERNAL MEDICINE CLINIC | Age: 77
End: 2022-01-14

## 2022-01-14 NOTE — TELEPHONE ENCOUNTER
Patient fell 1/13/22 he slid off the bed and he is doing ok. They suggested he use his walker and she suggested a ureña to his family.

## 2022-04-07 ENCOUNTER — OFFICE VISIT (OUTPATIENT)
Dept: INTERNAL MEDICINE CLINIC | Age: 77
End: 2022-04-07
Payer: MEDICARE

## 2022-04-07 VITALS
HEIGHT: 66 IN | SYSTOLIC BLOOD PRESSURE: 130 MMHG | OXYGEN SATURATION: 100 % | WEIGHT: 148 LBS | BODY MASS INDEX: 23.78 KG/M2 | TEMPERATURE: 98.1 F | DIASTOLIC BLOOD PRESSURE: 68 MMHG | HEART RATE: 79 BPM

## 2022-04-07 DIAGNOSIS — D68.9 COAGULATION DEFECT, UNSPECIFIED (HCC): ICD-10-CM

## 2022-04-07 DIAGNOSIS — Z00.00 MEDICARE ANNUAL WELLNESS VISIT, SUBSEQUENT: Primary | ICD-10-CM

## 2022-04-07 DIAGNOSIS — F33.1 MODERATE EPISODE OF RECURRENT MAJOR DEPRESSIVE DISORDER (HCC): ICD-10-CM

## 2022-04-07 DIAGNOSIS — Z99.2 DEPENDENCE ON RENAL DIALYSIS (HCC): ICD-10-CM

## 2022-04-07 DIAGNOSIS — M86.00 ACUTE HEMATOGENOUS OSTEOMYELITIS, UNSPECIFIED SITE (HCC): ICD-10-CM

## 2022-04-07 DIAGNOSIS — I50.22 CHRONIC SYSTOLIC (CONGESTIVE) HEART FAILURE (HCC): ICD-10-CM

## 2022-04-07 DIAGNOSIS — I48.0 PAROXYSMAL ATRIAL FIBRILLATION (HCC): ICD-10-CM

## 2022-04-07 PROCEDURE — G0439 PPPS, SUBSEQ VISIT: HCPCS | Performed by: INTERNAL MEDICINE

## 2022-04-07 RX ORDER — HYDROCODONE BITARTRATE AND ACETAMINOPHEN 5; 325 MG/1; MG/1
1 TABLET ORAL EVERY 6 HOURS PRN
Qty: 28 TABLET | Refills: 0 | Status: SHIPPED | OUTPATIENT
Start: 2022-04-07 | End: 2022-04-14

## 2022-04-07 RX ORDER — RENO CAPS 100; 1.5; 1.7; 20; 10; 1; 150; 5; 6 MG/1; MG/1; MG/1; MG/1; MG/1; MG/1; UG/1; MG/1; UG/1
CAPSULE ORAL
COMMUNITY
Start: 2022-03-24

## 2022-04-07 RX ORDER — FERRIC CITRATE 210 MG/1
1 TABLET, COATED ORAL
COMMUNITY

## 2022-04-07 RX ORDER — CHOLECALCIFEROL (VITAMIN D3) 50 MCG
TABLET ORAL
COMMUNITY
Start: 2022-02-23 | End: 2022-10-17 | Stop reason: ALTCHOICE

## 2022-04-07 SDOH — HEALTH STABILITY: PHYSICAL HEALTH: ON AVERAGE, HOW MANY DAYS PER WEEK DO YOU ENGAGE IN MODERATE TO STRENUOUS EXERCISE (LIKE A BRISK WALK)?: 0 DAYS

## 2022-04-07 SDOH — HEALTH STABILITY: PHYSICAL HEALTH: ON AVERAGE, HOW MANY MINUTES DO YOU ENGAGE IN EXERCISE AT THIS LEVEL?: 0 MIN

## 2022-04-07 ASSESSMENT — LIFESTYLE VARIABLES
HOW OFTEN DO YOU HAVE SIX OR MORE DRINKS ON ONE OCCASION: 1
HOW MANY STANDARD DRINKS CONTAINING ALCOHOL DO YOU HAVE ON A TYPICAL DAY: PATIENT DECLINED
HOW MANY STANDARD DRINKS CONTAINING ALCOHOL DO YOU HAVE ON A TYPICAL DAY: 98
HOW OFTEN DO YOU HAVE A DRINK CONTAINING ALCOHOL: NEVER
HOW OFTEN DO YOU HAVE A DRINK CONTAINING ALCOHOL: 1

## 2022-04-07 ASSESSMENT — PATIENT HEALTH QUESTIONNAIRE - PHQ9
2. FEELING DOWN, DEPRESSED OR HOPELESS: 1
1. LITTLE INTEREST OR PLEASURE IN DOING THINGS: 0
SUM OF ALL RESPONSES TO PHQ9 QUESTIONS 1 & 2: 1
SUM OF ALL RESPONSES TO PHQ QUESTIONS 1-9: 1

## 2022-04-07 NOTE — PATIENT INSTRUCTIONS
Personalized Preventive Plan for Iona Lefort. - 4/7/2022  Medicare offers a range of preventive health benefits. Some of the tests and screenings are paid in full while other may be subject to a deductible, co-insurance, and/or copay. Some of these benefits include a comprehensive review of your medical history including lifestyle, illnesses that may run in your family, and various assessments and screenings as appropriate. After reviewing your medical record and screening and assessments performed today your provider may have ordered immunizations, labs, imaging, and/or referrals for you. A list of these orders (if applicable) as well as your Preventive Care list are included within your After Visit Summary for your review. Other Preventive Recommendations:    · A preventive eye exam performed by an eye specialist is recommended every 1-2 years to screen for glaucoma; cataracts, macular degeneration, and other eye disorders. · A preventive dental visit is recommended every 6 months. · Try to get at least 150 minutes of exercise per week or 10,000 steps per day on a pedometer . · Order or download the FREE \"Exercise & Physical Activity: Your Everyday Guide\" from [employer]. Call 6-818.479.5424 or search [employer] online. · You need 2860-1807 mg of calcium and 5294-6996 IU of vitamin D per day. It is possible to meet your calcium requirement with diet alone, but a vitamin D supplement is usually necessary to meet this goal.  · When exposed to the sun, use a sunscreen that protects against both UVA and UVB radiation with an SPF of 30 or greater. Reapply every 2 to 3 hours or after sweating, drying off with a towel, or swimming. · Always wear a seat belt when traveling in a car. Always wear a helmet when riding a bicycle or motorcycle.

## 2022-04-07 NOTE — PROGRESS NOTES
Medicare Annual Wellness Visit    Bryce Vasquez is here for Medicare AWV    Assessment & Plan      Acute hematogenous osteomyelitis, unspecified site ([facility]  -     HYDROcodone-acetaminophen (NORCO) 5-325 MG per tablet; Take 1 tablet by mouth every 6 hours as needed for Pain for up to 7 days. Intended supply: 7 days. Take lowest dose possible to manage pain, Disp-28 tablet, R-0Normal  Dependence on renal dialysis (HCC)  Chronic systolic (congestive) heart failure (HCC)  Moderate episode of recurrent major depressive disorder (HCC)  Paroxysmal atrial fibrillation (HCC)  Coagulation defect, unspecified ([address]      Recommendations for Preventive Services Due: see orders and patient instructions/AVS.  Recommended screening schedule for the next 5-10 years is provided to the patient in written form: see Patient Instructions/AVS.     No follow-ups on file. Subjective       Diagnosis Orders   1. Medicare annual wellness visit, subsequent     2. Acute hematogenous osteomyelitis, unspecified site (HCC)  HYDROcodone-acetaminophen (NORCO) 5-325 MG per tablet   3. Dependence on renal dialysis ([facility]     4. Chronic systolic (congestive) heart failure (HCC)     5. Moderate episode of recurrent major depressive disorder (HCC)     6. Paroxysmal atrial fibrillation (HCC)     7. Coagulation defect, unspecified ([address]           Patient's complete Health Risk Assessment and screening values have been reviewed and are found in Flowsheets. The following problems were reviewed today and where indicated follow up appointments were made and/or referrals ordered. Positive Risk Factor Screenings with Interventions:    Fall Risk:  Do you feel unsteady or are you worried about falling? : (!) yes  2 or more falls in past year?: (!) yes  Fall with injury in past year?: (!) yes     Fall Risk Interventions:    · Home safety tips provided, he should get more exercise, even just more walking, and walk with a walker till he gets stronger. He has poor compliance with any suggestions.        Tobacco Use:     Tobacco Use: High Risk    Smoking Tobacco Use: Current Every Day Smoker    Smokeless Tobacco Use: Never Used     E-Cigarettes/Vaping Use     Questions Responses    E-Cigarette/Vaping Use Never User    Start Date     Passive Exposure     Quit Date     Counseling Given     Comments         Substance Abuse - Tobacco Interventions:  patient is not ready to work toward tobacco cessation at this time         General Health and ACP:  General  In general, how would you say your health is?: Fair  In the past 7 days, have you experienced any of the following: New or Increased Pain, New or Increased Fatigue, Loneliness, Social Isolation, Stress or Anger?: No  Do you get the social and emotional support that you need?: Yes  Do you have a Living Will?: (!) No    Advance Directives     Power of  Living Will ACP-Advance Directive ACP-Power of     Not on File Not on File Not on File Not on File      General Health Risk Interventions:  · No Living Will: Patient declines ACP discussion/assistance    Health Habits/Nutrition:     Physical Activity: Inactive    Days of Exercise per Week: 0 days    Minutes of Exercise per Session: 0 min     Have you lost any weight without trying in the past 3 months?: No  Body mass index: 23.89  Have you seen the dentist within the past year?: N/A - wear dentures    Health Habits/Nutrition Interventions:  · Inadequate physical activity:  patient is not ready to increase his/her physical activity level at this time    Hearing/Vision:  Do you or your family notice any trouble with your hearing that hasn't been managed with hearing aids?: No  Do you have difficulty driving, watching TV, or doing any of your daily activities because of your eyesight?: (!) Yes  Have you had an eye exam within the past year?: Yes  No exam data present    Hearing/Vision Interventions:  · Vision concerns:  patient encouraged to make appointment with his/her eye specialist     ADLs:  In the past 7 days, did you need help from others to perform any of the following everyday activities: Eating, dressing, grooming, bathing, toileting, or walking/balance?: (!) Yes  Select all that apply: (!) Dressing  In the past 7 days, did you need help from others to take care of any of the following: Laundry, housekeeping, banking/finances, shopping, telephone use, food preparation, transportation, or taking medications?: (!) Yes  Select all that apply: (!) Laundry,Housekeeping,Banking/Finances,Shopping,Telephone Use,Food Preparation,Transportation    ADL Interventions:  · patient gets assistance from his wife and family. Objective   Vitals:    04/07/22 1002   BP: 130/68   Pulse: 79   Temp: 98.1 °F (36.7 °C)   SpO2: 100%   Weight: 148 lb (67.1 kg)   Height: 5' 6\" (1.676 m)      Body mass index is 23.89 kg/m². General Appearance: alert and oriented to person, place and time, well developed and well- nourished, in no acute distress  Skin: warm and dry, no rash or erythema  Head: normocephalic and atraumatic  Eyes:  extraocular eye movements intact, conjunctivae normal  Neck: supple and non-tender without mass, no thyromegaly or thyroid nodules, no cervical lymphadenopathy  Pulmonary/Chest: clear to auscultation bilaterally- no wheezes, rales or rhonchi, normal air movement, no respiratory distress  Cardiovascular: normal rate, regular rhythm, normal S1 and S2, no murmur  Extremities: no cyanosis, clubbing or edema  Musculoskeletal: normal range of motion, no joint swelling, deformity or tenderness  Neurologic: reflexes normal and symmetric, no cranial nerve deficit, gait, coordination and speech normal       Allergies   Allergen Reactions    Corn-Containing Products      Other reaction(s): GI Upset  Only corn kernel     Prior to Visit Medications    Medication Sig Taking?  Authorizing Provider   vitamin D (CHOLECALCIFEROL) 50 MCG (2000 UT) TABS tablet  Yes Historical Provider, MD   B Complex-C-Folic Acid (MARCIAL CAPS) 1 MG CAPS  Yes Historical Provider, MD   HYDROcodone-acetaminophen (NORCO) 5-325 MG per tablet Take 1 tablet by mouth every 6 hours as needed for Pain for up to 7 days. Intended supply: 7 days. Take lowest dose possible to manage pain Yes Jaswinder Owens MD   Misc. Devices [facility]) MISC 1 each by Does not apply route daily Fax to Fax # [phone] [facility] DME Yes Jaswinder Owens MD   sertraline (ZOLOFT) 50 MG tablet TAKE ONE TABLET BY MOUTH DAILY.  DISCONTINUE FLUOXETINE Yes Jaswinder Owens MD   midodrine (PROAMATINE) 5 MG tablet Take 5 mg by mouth 3 times daily (with meals)  Yes Historical Provider, MD   amiodarone (CORDARONE) 200 MG tablet Take 1 tablet by mouth daily Yes Jaswinder Owens MD   apixaban (ELIQUIS) 2.5 MG TABS tablet Take 2.5 mg by mouth 2 times daily  Yes Historical Provider, MD   torsemide (DEMADEX) 20 MG tablet Take 1 tablet by mouth See Admin Instructions 2 pills daily at 6 am and one daily at noon  Patient not taking: Reported on 4/7/2022  Jaswinder Owens MD       [facility] (Including outside providers/suppliers regularly involved in providing care):   Patient Care Team:  Jaswinder Owens MD as PCP - General  Jaswinder Owens MD as PCP - [facility]  Brenda Alford MD as Consulting Physician (Vascular Surgery)    Reviewed and updated this visit:  Tobacco  Allergies  Meds  Med Hx  Surg Hx  Soc Hx  Fam Hx

## 2022-05-02 LAB
ALBUMIN SERPL-MCNC: 3.6 G/DL (ref 3.5–5.7)
ALP BLD-CCNC: 169 IU/L (ref 35–135)
ALT SERPL-CCNC: 9 IU/L (ref 10–60)
ANION GAP SERPL CALCULATED.3IONS-SCNC: 9 MMOL/L (ref 6–18)
APTT: 38.7 SECONDS (ref 23.6–34)
AST SERPL-CCNC: 13 IU/L (ref 10–40)
BILIRUB SERPL-MCNC: 1.8 MG/DL (ref 0–1.2)
BILIRUBIN DIRECT: 0.6 MG/DL (ref 0–0.2)
BUN BLDV-MCNC: 10 MG/DL (ref 8–26)
CALCIUM SERPL-MCNC: 9.7 MG/DL (ref 8.5–10.4)
CHLORIDE BLD-SCNC: 98 MEQ/L (ref 98–111)
CHOLESTEROL, TOTAL: 170 MG/DL
CO2: 34 MMOL/L (ref 21–31)
CREAT SERPL-MCNC: 4.01 MG/DL (ref 0.7–1.3)
EGFR (CKD-EPI): 15 ML/MIN/1.73 M2
GLUCOSE BLD-MCNC: 90 MG/DL (ref 70–99)
HCT VFR BLD CALC: 37.1 % (ref 40–50)
HDLC SERPL-MCNC: 60 MG/DL
HEMOGLOBIN: 12.2 G/DL (ref 13.5–16.5)
INR BLD: 1.4 (ref 0.8–1.2)
LDL CHOLESTEROL CALCULATED: 87 MG/DL
MCH RBC QN AUTO: 33 PG (ref 27–33)
MCHC RBC AUTO-ENTMCNC: 32.9 G/DL (ref 32–36)
MCV RBC AUTO: 100.3 FL (ref 82–97)
NONHDLC SERPL-MCNC: 110 MG/DL
PDW BLD-RTO: 16.2 % (ref 12.3–17)
PLATELET # BLD: 158 THOU/MCL (ref 140–375)
PMV BLD AUTO: 7.8 FL (ref 7.4–11.5)
POTASSIUM SERPL-SCNC: 3.6 MEQ/L (ref 3.6–5.1)
PROTHROMBIN TIME: 17 SECONDS (ref 11.7–14.2)
RBC # BLD: 3.7 MIL/MCL (ref 4.4–5.8)
SODIUM BLD-SCNC: 141 MEQ/L (ref 135–145)
TOTAL PROTEIN: 7.2 G/DL (ref 6–8)
TRIGL SERPL-MCNC: 115 MG/DL
WBC # BLD: 3.6 THOU/MCL (ref 3.6–10.5)

## 2022-06-01 NOTE — PROGRESS NOTES
Patient admitted to room 5510. Patient is alert and oriented. Vital signs are stable. Patient rates pain at 8/10. Patient's pain is controlled with medication per MAR. Bed is in the lowest position. Bed alarm is activated. Call light is within reach. Will continue to monitor and reassess. Problem: Safety - Adult  Goal: Free from fall injury  Reactivated

## 2022-08-18 ENCOUNTER — APPOINTMENT (OUTPATIENT)
Dept: GENERAL RADIOLOGY | Age: 77
End: 2022-08-18
Payer: MEDICARE

## 2022-08-18 ENCOUNTER — HOSPITAL ENCOUNTER (EMERGENCY)
Age: 77
Discharge: HOME | End: 2022-08-18
Attending: EMERGENCY MEDICINE
Payer: MEDICARE

## 2022-08-18 VITALS
RESPIRATION RATE: 16 BRPM | WEIGHT: 145 LBS | HEART RATE: 80 BPM | HEIGHT: 66 IN | SYSTOLIC BLOOD PRESSURE: 119 MMHG | BODY MASS INDEX: 23.3 KG/M2 | DIASTOLIC BLOOD PRESSURE: 81 MMHG | TEMPERATURE: 98.3 F | OXYGEN SATURATION: 92 %

## 2022-08-18 DIAGNOSIS — S80.11XA CONTUSION OF RIGHT LEG, INITIAL ENCOUNTER: Primary | ICD-10-CM

## 2022-08-18 PROCEDURE — 73590 X-RAY EXAM OF LOWER LEG: CPT

## 2022-08-18 PROCEDURE — 99283 EMERGENCY DEPT VISIT LOW MDM: CPT

## 2022-08-18 ASSESSMENT — PAIN DESCRIPTION - LOCATION: LOCATION: LEG

## 2022-08-18 ASSESSMENT — PAIN - FUNCTIONAL ASSESSMENT: PAIN_FUNCTIONAL_ASSESSMENT: 0-10

## 2022-08-18 ASSESSMENT — PAIN SCALES - GENERAL: PAINLEVEL_OUTOF10: 7

## 2022-08-18 ASSESSMENT — PAIN DESCRIPTION - ORIENTATION: ORIENTATION: RIGHT

## 2022-08-18 NOTE — ED TRIAGE NOTES
Pt c/o swelling in right leg that he noticed as he was leaving dialysis at 4 pm today, states area of swelling is hard and painful to touch

## 2022-08-18 NOTE — ED NOTES
Pt discharged from ED in stable condition. Discharge instruction explain, all question answered. Pt walked to lobby independently.        Patrick Guillory, GARETH  08/18/22 [id number]

## 2022-08-18 NOTE — DISCHARGE INSTRUCTIONS
The bump on your leg is likely a bruise. There is no evidence of infection at this time.   Return for difficulty walking, development of redness or breaks in the skin over the area, enlargement, or any other concerns

## 2022-08-18 NOTE — ED PROVIDER NOTES
AMIODARONE (CORDARONE) 200 MG TABLET    Take 1 tablet by mouth daily    APIXABAN (ELIQUIS) 2.5 MG TABS TABLET    Take 2.5 mg by mouth 2 times daily     B COMPLEX-C-FOLIC ACID (MARCIAL CAPS) 1 MG CAPS        FERRIC CITRATE (AURYXIA) 1  MG(FE) TABS    Take 1 tablet by mouth 3 times daily (with meals)    MIDODRINE (PROAMATINE) 5 MG TABLET    Take 5 mg by mouth 3 times daily (with meals)     MISC. DEVICES (ROLLING WALKER/BURGUNDY) MISC    1 each by Does not apply route daily Fax to Fax # [phone] [facility]    SERTRALINE (ZOLOFT) 50 MG TABLET    TAKE ONE TABLET BY MOUTH DAILY. DISCONTINUE FLUOXETINE    VITAMIN D (CHOLECALCIFEROL) 50 MCG (2000 UT) TABS TABLET           Allergies     He is allergic to corn-containing products. Physical Exam     INITIAL VITALS: BP: 120/74, Temp: 98.3 °F (36.8 °C), Heart Rate: 88, Resp: 16, SpO2: 100 %   Physical Exam  Vitals and nursing note reviewed. Constitutional:       Appearance: Normal appearance. HENT:      Head: Normocephalic and atraumatic. Cardiovascular:      Rate and Rhythm: Regular rhythm. Pulses: Normal pulses. Comments: AV fistula to the left upper extremity with strong thrill  Pulmonary:      Effort: Pulmonary effort is normal. No respiratory distress. Musculoskeletal:         General: Tenderness present. Normal range of motion. Comments: Full range of motion about the knee and ankle on the right. Is noted to have a raised area to the right lateral lower leg without any overlying erythema, no breaks in skin, no rashes. Area is slightly mobile it is tender to touch, no fluctuance   Skin:     Capillary Refill: Capillary refill takes less than 2 seconds. Neurological:      Mental Status: He is alert. Diagnostic Results     RADIOLOGY:  XR TIBIA FIBULA RIGHT (2 VIEWS)   Final Result   No acute findings. LABS:   No results found for this visit on 08/18/22.     ED BEDSIDE ULTRASOUND:  No results found.    RECENT VITALS:  BP: 119/81,Temp: 98.3 °F (36.8 °C), Heart Rate: 80, Resp: 16, SpO2: 92 %     ED Course     Nursing Notes, Past Medical Hx, Past Surgical Hx, Social Hx,Allergies, and Family Hx were reviewed. patient was given the following medications:  No orders of the defined types were placed in this encounter. CONSULTS:  None    MEDICAL DECISIONMAKING / ASSESSMENT / Murray Gave. is a 77 y.o. male who presents with pain to his right lower extremity. No known injuries. On exam it does not appear to be bruising although there is the potential that there is underlying hematoma. X-ray does not show any underlying bony injuries and notes calcifications of this raised area. No fluctuance and no overlying erythema to suggest this is an abscess. Also not consistent with such with sudden onset today. Patient advised that this is likely a contusion. There is the possibility that it is a lipoma but again with the sudden onset today this is less likely    Clinical Impression     1.  Contusion of right leg, initial encounter        Disposition     PATIENT REFERRED TO:  Papo Coleman MD  [address] [place] [address]  [phone]    Schedule an appointment as soon as possible for a visit   If symptoms worsen      DISCHARGE MEDICATIONS:  New Prescriptions    No medications on file       883 Alejandrina Villalba MD  08/18/22 3818

## 2022-08-24 ENCOUNTER — OFFICE VISIT (OUTPATIENT)
Dept: INTERNAL MEDICINE CLINIC | Age: 77
End: 2022-08-24
Payer: MEDICARE

## 2022-08-24 VITALS
HEIGHT: 66 IN | TEMPERATURE: 97.8 F | BODY MASS INDEX: 22.82 KG/M2 | WEIGHT: 142 LBS | SYSTOLIC BLOOD PRESSURE: 118 MMHG | DIASTOLIC BLOOD PRESSURE: 64 MMHG | HEART RATE: 71 BPM | OXYGEN SATURATION: 100 %

## 2022-08-24 DIAGNOSIS — S80.11XA HEMATOMA OF RIGHT LOWER LEG: Primary | ICD-10-CM

## 2022-08-24 DIAGNOSIS — M67.472 GANGLION CYST OF LEFT FOOT: ICD-10-CM

## 2022-08-24 PROCEDURE — 99213 OFFICE O/P EST LOW 20 MIN: CPT | Performed by: INTERNAL MEDICINE

## 2022-08-24 PROCEDURE — 1123F ACP DISCUSS/DSCN MKR DOCD: CPT | Performed by: INTERNAL MEDICINE

## 2022-08-24 SDOH — ECONOMIC STABILITY: FOOD INSECURITY: WITHIN THE PAST 12 MONTHS, YOU WORRIED THAT YOUR FOOD WOULD RUN OUT BEFORE YOU GOT MONEY TO BUY MORE.: NEVER TRUE

## 2022-08-24 SDOH — ECONOMIC STABILITY: FOOD INSECURITY: WITHIN THE PAST 12 MONTHS, THE FOOD YOU BOUGHT JUST DIDN'T LAST AND YOU DIDN'T HAVE MONEY TO GET MORE.: NEVER TRUE

## 2022-08-24 ASSESSMENT — PATIENT HEALTH QUESTIONNAIRE - PHQ9
9. THOUGHTS THAT YOU WOULD BE BETTER OFF DEAD, OR OF HURTING YOURSELF: 0
7. TROUBLE CONCENTRATING ON THINGS, SUCH AS READING THE NEWSPAPER OR WATCHING TELEVISION: 0
SUM OF ALL RESPONSES TO PHQ QUESTIONS 1-9: 1
6. FEELING BAD ABOUT YOURSELF - OR THAT YOU ARE A FAILURE OR HAVE LET YOURSELF OR YOUR FAMILY DOWN: 0
2. FEELING DOWN, DEPRESSED OR HOPELESS: 0
SUM OF ALL RESPONSES TO PHQ9 QUESTIONS 1 & 2: 1
5. POOR APPETITE OR OVEREATING: 0
1. LITTLE INTEREST OR PLEASURE IN DOING THINGS: 1
SUM OF ALL RESPONSES TO PHQ QUESTIONS 1-9: 1
10. IF YOU CHECKED OFF ANY PROBLEMS, HOW DIFFICULT HAVE THESE PROBLEMS MADE IT FOR YOU TO DO YOUR WORK, TAKE CARE OF THINGS AT HOME, OR GET ALONG WITH OTHER PEOPLE: 0
4. FEELING TIRED OR HAVING LITTLE ENERGY: 0
SUM OF ALL RESPONSES TO PHQ QUESTIONS 1-9: 1
3. TROUBLE FALLING OR STAYING ASLEEP: 0
SUM OF ALL RESPONSES TO PHQ QUESTIONS 1-9: 1
8. MOVING OR SPEAKING SO SLOWLY THAT OTHER PEOPLE COULD HAVE NOTICED. OR THE OPPOSITE, BEING SO FIGETY OR RESTLESS THAT YOU HAVE BEEN MOVING AROUND A LOT MORE THAN USUAL: 0

## 2022-08-24 ASSESSMENT — ENCOUNTER SYMPTOMS
BACK PAIN: 0
CHEST TIGHTNESS: 0
NAUSEA: 0
EYE REDNESS: 0
ABDOMINAL PAIN: 0
SHORTNESS OF BREATH: 0

## 2022-08-24 ASSESSMENT — SOCIAL DETERMINANTS OF HEALTH (SDOH): HOW HARD IS IT FOR YOU TO PAY FOR THE VERY BASICS LIKE FOOD, HOUSING, MEDICAL CARE, AND HEATING?: VERY HARD

## 2022-08-24 NOTE — PROGRESS NOTES
[place]    Encounter for imaging to screen for metal prior to MRI 11/04/2021    NOT MRI Conditional [facility] ICD Model#[id number] Serial# [id number] implanted 1/22/14 Lead: RV 1580/65 implanted 8/23/06 at [facility]. Patients ICD system is NOT approved for MRI(battery and lead). Patient is unable to have any type of MRI. H/O medication noncompliance     HTN (hypertension) 08/16/2011    Other and unspecified hyperlipidemia 08/16/2011    Pacemaker     Presence of implantable cardioverter-defibrillator (ICD)     Sleep disorder     Tachycardia 08/16/2011    TIA (transient ischemic attack) 08/16/2011     Past Surgical History:   Procedure Laterality Date    CARDIAC DEFIBRILLATOR PLACEMENT  2013    [facility]    DIALYSIS FISTULA CREATION Left 3/4/2021    LEFT BACHIAL CEPHALIC FISTULA CREATION performed by Claudio Nayak MD at [address] Left 11/4/2021    irrigation and DEBRIDEMENT middle sheath LEFT RING FINGER, Partial AMPUTATION LEFT RING FINGER performed by Aniket De Jesus MD at [address] [place] [address] Bilateral [id number]    PACEMAKER PLACEMENT  2013     Social History     Socioeconomic History    Marital status:      Spouse name: Not on file    Number of children: 3    Years of education: Not on file    Highest education level: Not on file   Occupational History    Occupation:     Tobacco Use    Smoking status: Every Day     Packs/day: 0.25     Years: 30.00     Pack years: 7.50     Types: Cigarettes     Last attempt to quit: 10/13/2018     Years since quitting: 3.8    Smokeless tobacco: Never    Tobacco comments:     pt has cut down   Vaping Use    Vaping Use: Never used   Substance and Sexual Activity    Alcohol use: No     Alcohol/week: 0.0 standard drinks    Drug use: No    Sexual activity: Not on file   Other Topics Concern    Not on file   Social History Narrative    He follows [employer] and field sports. 3 children and 5 grandchildren.       Social Determinants of Health     Financial arthralgias, back pain and neck pain. Left ankle bump   Skin:  Negative for rash and wound. Neurological:  Negative for dizziness and headaches. Hematological:  Negative for adenopathy. Does not bruise/bleed easily. Psychiatric/Behavioral:  Negative for agitation. The patient is not nervous/anxious. Objective:    Physical Exam  Constitutional:       Appearance: Normal appearance. Eyes:      Extraocular Movements: Extraocular movements intact. Cardiovascular:      Rate and Rhythm: Normal rate and regular rhythm. Pulmonary:      Effort: Pulmonary effort is normal.      Breath sounds: No wheezing or rales. Musculoskeletal:      Right lower leg: No edema. Left lower leg: No edema. Comments: Right lower leg anterior shin 5 cm subcutaneous hematoma with mild tenderness to palpation. Skin lesion. The left ankle has a small cystic compressible growth. Neurological:      Mental Status: He is alert and oriented to person, place, and time. Vitals:    08/24/22 0901   BP: 118/64   Pulse: 71   Temp: 97.8 °F (36.6 °C)   SpO2: 100%       Assessment and plan       1. Hematoma of right lower leg  Hematoma of the right lower leg which is beginning to resolve. Topical heating pad if needed. Await leg if needed. 2. Ganglion cyst of left foot  Soft compressible cystic mass of the left foot. Observation only.

## 2022-10-17 ENCOUNTER — OFFICE VISIT (OUTPATIENT)
Dept: INTERNAL MEDICINE CLINIC | Age: 77
End: 2022-10-17
Payer: MEDICARE

## 2022-10-17 VITALS
HEIGHT: 66 IN | WEIGHT: 149.2 LBS | DIASTOLIC BLOOD PRESSURE: 70 MMHG | SYSTOLIC BLOOD PRESSURE: 108 MMHG | TEMPERATURE: 97.1 F | HEART RATE: 72 BPM | BODY MASS INDEX: 23.98 KG/M2 | OXYGEN SATURATION: 97 %

## 2022-10-17 DIAGNOSIS — I50.22 CHRONIC SYSTOLIC (CONGESTIVE) HEART FAILURE (HCC): Primary | ICD-10-CM

## 2022-10-17 DIAGNOSIS — G47.33 OSA (OBSTRUCTIVE SLEEP APNEA): ICD-10-CM

## 2022-10-17 DIAGNOSIS — Z23 NEED FOR INFLUENZA VACCINATION: ICD-10-CM

## 2022-10-17 DIAGNOSIS — I10 ESSENTIAL HYPERTENSION: ICD-10-CM

## 2022-10-17 PROCEDURE — 99214 OFFICE O/P EST MOD 30 MIN: CPT | Performed by: INTERNAL MEDICINE

## 2022-10-17 PROCEDURE — 1123F ACP DISCUSS/DSCN MKR DOCD: CPT | Performed by: INTERNAL MEDICINE

## 2022-10-17 PROCEDURE — 90694 VACC AIIV4 NO PRSRV 0.5ML IM: CPT | Performed by: INTERNAL MEDICINE

## 2022-10-17 PROCEDURE — G0008 ADMIN INFLUENZA VIRUS VAC: HCPCS | Performed by: INTERNAL MEDICINE

## 2022-10-17 ASSESSMENT — PATIENT HEALTH QUESTIONNAIRE - PHQ9
2. FEELING DOWN, DEPRESSED OR HOPELESS: 1
SUM OF ALL RESPONSES TO PHQ9 QUESTIONS 1 & 2: 2
7. TROUBLE CONCENTRATING ON THINGS, SUCH AS READING THE NEWSPAPER OR WATCHING TELEVISION: 0
SUM OF ALL RESPONSES TO PHQ QUESTIONS 1-9: 8
5. POOR APPETITE OR OVEREATING: 0
1. LITTLE INTEREST OR PLEASURE IN DOING THINGS: 1
10. IF YOU CHECKED OFF ANY PROBLEMS, HOW DIFFICULT HAVE THESE PROBLEMS MADE IT FOR YOU TO DO YOUR WORK, TAKE CARE OF THINGS AT HOME, OR GET ALONG WITH OTHER PEOPLE: 0
SUM OF ALL RESPONSES TO PHQ QUESTIONS 1-9: 8
4. FEELING TIRED OR HAVING LITTLE ENERGY: 3
9. THOUGHTS THAT YOU WOULD BE BETTER OFF DEAD, OR OF HURTING YOURSELF: 0
6. FEELING BAD ABOUT YOURSELF - OR THAT YOU ARE A FAILURE OR HAVE LET YOURSELF OR YOUR FAMILY DOWN: 0
8. MOVING OR SPEAKING SO SLOWLY THAT OTHER PEOPLE COULD HAVE NOTICED. OR THE OPPOSITE, BEING SO FIGETY OR RESTLESS THAT YOU HAVE BEEN MOVING AROUND A LOT MORE THAN USUAL: 0
3. TROUBLE FALLING OR STAYING ASLEEP: 3

## 2022-10-17 ASSESSMENT — ENCOUNTER SYMPTOMS
ABDOMINAL PAIN: 0
NAUSEA: 0
EYE REDNESS: 0
CHEST TIGHTNESS: 0
SHORTNESS OF BREATH: 0
BACK PAIN: 0

## 2022-10-17 NOTE — PROGRESS NOTES
0    sertraline (ZOLOFT) 50 MG tablet TAKE ONE TABLET BY MOUTH DAILY. DISCONTINUE FLUOXETINE 90 tablet 4    midodrine (PROAMATINE) 5 MG tablet Take 5 mg by mouth 3 times daily (with meals)       amiodarone (CORDARONE) 200 MG tablet Take 1 tablet by mouth daily 30 tablet 5    apixaban (ELIQUIS) 2.5 MG TABS tablet Take 2.5 mg by mouth 2 times daily        No current facility-administered medications on file prior to visit. Allergies   Allergen Reactions    Corn-Containing Products      Other reaction(s): GI Upset  Only corn kernel       Review of Systems   Constitutional:  Positive for fatigue. Negative for fever and unexpected weight change. HENT:  Negative for hearing loss. Eyes:  Negative for redness and visual disturbance. Respiratory:  Negative for chest tightness and shortness of breath. Cardiovascular:  Negative for chest pain and palpitations. Gastrointestinal:  Negative for abdominal pain and nausea. Endocrine: Negative for polydipsia and polyuria. Genitourinary:  Negative for dysuria and hematuria. Musculoskeletal:  Negative for arthralgias, back pain and neck pain. Skin:  Negative for rash and wound. Neurological:  Negative for dizziness and headaches. Hematological:  Negative for adenopathy. Does not bruise/bleed easily. Psychiatric/Behavioral:  Negative for agitation. The patient is not nervous/anxious. Objective:   Physical Exam  Constitutional:       Appearance: Normal appearance. Eyes:      Extraocular Movements: Extraocular movements intact. Cardiovascular:      Rate and Rhythm: Normal rate and regular rhythm. Pulmonary:      Effort: Pulmonary effort is normal.      Breath sounds: Normal breath sounds. Musculoskeletal:      Right lower leg: No edema. Left lower leg: No edema. Comments: Some left arm edema   Neurological:      Mental Status: He is alert and oriented to person, place, and time.       Comments: Sleepy    Psychiatric:         Mood and Affect: Mood normal.       Assessment and plan       1. Chronic systolic (congestive) heart failure (HCC)  Stable chronic systolic heart failure. Continue on dialysis. 2. Essential hypertension  Stable hypertension. Continue current medicines. Continue regular dialysis. 3. TREY (obstructive sleep apnea)  I discussed with the patient and his wife the options of CPAP for treatment of TREY and inspire treatment. They will consider seeing the sleep specialist to discuss further. 4. Need for influenza vaccination  Stable. Flu shot. - Influenza, FLUAD, (age 72 y+), IM, Preservative Free, 0.5 mL    Swelling left arm. This is the arm that has his dialysis access shunt. He should bring this to the attention of his dialysis physician.

## 2022-10-26 ENCOUNTER — TELEPHONE (OUTPATIENT)
Dept: INTERNAL MEDICINE CLINIC | Age: 77
End: 2022-10-26

## 2022-10-26 NOTE — TELEPHONE ENCOUNTER
They would like a verbal order for skilled nursing, PT and OT.     FAX  [phone]    Please call and advise

## 2022-10-26 NOTE — TELEPHONE ENCOUNTER
Left vm on confidential vm of Demetra with verbal and last FTF visit and to call back with any questions/concerns

## 2022-11-14 ENCOUNTER — TELEPHONE (OUTPATIENT)
Dept: INTERNAL MEDICINE CLINIC | Age: 77
End: 2022-11-14

## 2022-11-14 NOTE — TELEPHONE ENCOUNTER
Left confidential message on Maggie's vm informing her it is ok for Palmer Goodson to switch home care agencies and to call back to confirm she has received the message

## 2022-11-14 NOTE — TELEPHONE ENCOUNTER
Pt wife does not want [facility] [place] as the home care provider. Would like a referral for [address] [place] [address] skilled [facility].        Please call sabino advise about referral           Please advise

## 2022-11-15 DIAGNOSIS — I50.22 CHRONIC SYSTOLIC (CONGESTIVE) HEART FAILURE (HCC): Primary | ICD-10-CM

## 2022-11-15 NOTE — TELEPHONE ENCOUNTER
[phone] (home) [phone] (work)  Spoke with pt and Hakeem Hoyos also called [facility] skilled care and I faxed everything they needed and Referral is placed. Please advise.

## 2023-01-03 DIAGNOSIS — F33.1 MODERATE EPISODE OF RECURRENT MAJOR DEPRESSIVE DISORDER (HCC): ICD-10-CM

## 2023-01-16 ENCOUNTER — TELEPHONE (OUTPATIENT)
Dept: INTERNAL MEDICINE CLINIC | Age: 78
End: 2023-01-16

## 2023-01-16 NOTE — TELEPHONE ENCOUNTER
Patient's wife called in stating that the patient fell on Thursday and hit his head. Patient was bleeding. Patient tried to call Renetta Number but could not remember her phone number. Patient also vomited up food or medication Friday and Saturday but was able to keep food down last night. Patient's wife Renetta Number would like to know if she should be doing anything to help the patient. Pls call and advise.

## 2023-01-17 NOTE — TELEPHONE ENCOUNTER
Any patient on blood thinners is supposed to be checked at the emergency room if they hit their head. He should probably get checked out at the emergency room to make sure he did not have any serious head injury.

## 2023-01-17 NOTE — TELEPHONE ENCOUNTER
[phone] (home) [phone] (work)  Pt wife Tayler Oliveira stated that even though it happen Thursday should she still take him to the ER? even though he is doing okay now.

## 2023-01-23 ENCOUNTER — OFFICE VISIT (OUTPATIENT)
Dept: INTERNAL MEDICINE CLINIC | Age: 78
End: 2023-01-23
Payer: MEDICARE

## 2023-01-23 VITALS
WEIGHT: 140.2 LBS | BODY MASS INDEX: 22.53 KG/M2 | SYSTOLIC BLOOD PRESSURE: 100 MMHG | DIASTOLIC BLOOD PRESSURE: 60 MMHG | OXYGEN SATURATION: 94 % | HEIGHT: 66 IN | HEART RATE: 52 BPM | TEMPERATURE: 98.1 F

## 2023-01-23 DIAGNOSIS — Z79.01 ANTICOAGULANT LONG-TERM USE: ICD-10-CM

## 2023-01-23 DIAGNOSIS — I48.0 PAROXYSMAL ATRIAL FIBRILLATION (HCC): ICD-10-CM

## 2023-01-23 DIAGNOSIS — D68.9 COAGULATION DEFECT, UNSPECIFIED (HCC): ICD-10-CM

## 2023-01-23 DIAGNOSIS — Z99.2 DEPENDENCE ON RENAL DIALYSIS (HCC): ICD-10-CM

## 2023-01-23 DIAGNOSIS — I50.22 CHRONIC SYSTOLIC (CONGESTIVE) HEART FAILURE (HCC): ICD-10-CM

## 2023-01-23 DIAGNOSIS — S09.90XA TRAUMATIC INJURY OF HEAD, INITIAL ENCOUNTER: Primary | ICD-10-CM

## 2023-01-23 DIAGNOSIS — B35.1 ONYCHOMYCOSIS: ICD-10-CM

## 2023-01-23 DIAGNOSIS — F33.1 MODERATE EPISODE OF RECURRENT MAJOR DEPRESSIVE DISORDER (HCC): ICD-10-CM

## 2023-01-23 PROCEDURE — 3078F DIAST BP <80 MM HG: CPT | Performed by: INTERNAL MEDICINE

## 2023-01-23 PROCEDURE — 99214 OFFICE O/P EST MOD 30 MIN: CPT | Performed by: INTERNAL MEDICINE

## 2023-01-23 PROCEDURE — 1123F ACP DISCUSS/DSCN MKR DOCD: CPT | Performed by: INTERNAL MEDICINE

## 2023-01-23 PROCEDURE — 3074F SYST BP LT 130 MM HG: CPT | Performed by: INTERNAL MEDICINE

## 2023-01-23 RX ORDER — METOPROLOL SUCCINATE 25 MG/1
TABLET, EXTENDED RELEASE ORAL
COMMUNITY
Start: 2023-01-20

## 2023-01-23 ASSESSMENT — PATIENT HEALTH QUESTIONNAIRE - PHQ9
4. FEELING TIRED OR HAVING LITTLE ENERGY: 3
SUM OF ALL RESPONSES TO PHQ QUESTIONS 1-9: 16
5. POOR APPETITE OR OVEREATING: 2
8. MOVING OR SPEAKING SO SLOWLY THAT OTHER PEOPLE COULD HAVE NOTICED. OR THE OPPOSITE, BEING SO FIGETY OR RESTLESS THAT YOU HAVE BEEN MOVING AROUND A LOT MORE THAN USUAL: 3
2. FEELING DOWN, DEPRESSED OR HOPELESS: 2
SUM OF ALL RESPONSES TO PHQ QUESTIONS 1-9: 16
SUM OF ALL RESPONSES TO PHQ QUESTIONS 1-9: 16
1. LITTLE INTEREST OR PLEASURE IN DOING THINGS: 3
3. TROUBLE FALLING OR STAYING ASLEEP: 3
SUM OF ALL RESPONSES TO PHQ QUESTIONS 1-9: 16
SUM OF ALL RESPONSES TO PHQ9 QUESTIONS 1 & 2: 5
7. TROUBLE CONCENTRATING ON THINGS, SUCH AS READING THE NEWSPAPER OR WATCHING TELEVISION: 0
10. IF YOU CHECKED OFF ANY PROBLEMS, HOW DIFFICULT HAVE THESE PROBLEMS MADE IT FOR YOU TO DO YOUR WORK, TAKE CARE OF THINGS AT HOME, OR GET ALONG WITH OTHER PEOPLE: 0
9. THOUGHTS THAT YOU WOULD BE BETTER OFF DEAD, OR OF HURTING YOURSELF: 0
6. FEELING BAD ABOUT YOURSELF - OR THAT YOU ARE A FAILURE OR HAVE LET YOURSELF OR YOUR FAMILY DOWN: 0

## 2023-01-23 ASSESSMENT — ENCOUNTER SYMPTOMS
CHEST TIGHTNESS: 0
ABDOMINAL PAIN: 0
NAUSEA: 0
SHORTNESS OF BREATH: 0
BACK PAIN: 0
VOMITING: 0
EYE REDNESS: 0

## 2023-01-23 NOTE — PROGRESS NOTES
Subjective:      Patient ID: Marlon Gilbert. is a 69 y.o. male    Chief Complaint   Patient presents with    Fall     4 days ago; had a scalp abrasion in a fall    Memory Loss     Wife is concerned pt is loosing memory        Fall  The accident occurred More than 1 week ago. The fall occurred while walking. He landed on [facility]. The volume of blood lost was minimal. The point of impact was the head, left knee and right knee. The pain is present in the head, left lower leg and right lower leg. The pain is mild. Associated symptoms include hematuria (one episode of hematuria after his fall, rare urination, of small amounts , follow up with Urology). Pertinent negatives include no abdominal pain, fever, headaches, nausea or vomiting. He has tried nothing for the symptoms. The treatment provided moderate relief. Congestive Heart Failure  Presents for follow-up visit. Associated symptoms include fatigue and palpitations. Pertinent negatives include no abdominal pain, chest pain, shortness of breath or unexpected weight change. The symptoms have been stable. Compliance with total regimen is 51-75%. Compliance problems include adherence to diet and adherence to exercise. Compliance with diet is 51-75%. Compliance with exercise is 0-25%. Compliance with medications is 51-75%. Palpitations   This is a chronic problem. The current episode started more than 1 month ago. The problem occurs constantly. Exacerbated by: TREY. Pertinent negatives include no anxiety, chest pain, dizziness, fever, nausea, shortness of breath or vomiting. He has tried beta blockers for the symptoms. Risk factors include sedentary lifestyle. Current Outpatient Medications on File Prior to Visit   Medication Sig Dispense Refill    metoprolol succinate (TOPROL XL) 25 MG extended release tablet       sertraline (ZOLOFT) 50 MG tablet TAKE ONE TABLET BY MOUTH DAILY.  DISCONTINUE FLUOXETINE 30 tablet 4    Ferric Citrate (AURYXIA) 1  MG(Fe) TABS Take 1 tablet by mouth 3 times daily (with meals)      Misc. Devices [facility]) MISC 1 each by Does not apply route daily Fax to Fax # [phone] [facility] D[facility] 1 each 0    midodrine (PROAMATINE) 5 MG tablet Take 5 mg by mouth 3 times daily (with meals)       amiodarone (CORDARONE) 200 MG tablet Take 1 tablet by mouth daily 30 tablet 5    apixaban (ELIQUIS) 2.5 MG TABS tablet Take 2.5 mg by mouth 2 times daily       B Complex-C-Folic Acid (MARCIAL CAPS) 1 MG CAPS        No current facility-administered medications on file prior to visit. Allergies   Allergen Reactions    Corn-Containing Products      Other reaction(s): GI Upset  Only corn kernel       Past Medical History:   Diagnosis Date    Cardiomyopathy [facility])     Cerebrovascular disease     CHF (congestive heart failure) ([facility] [address]     Dialysis patient [facility])     M-W-F [address] [facility] [place]    Encounter for imaging to screen for metal prior to MRI 11/04/2021    NOT MRI Conditional [facility] ICD Model#[id number] Serial# [id number] implanted 1/22/14 Lead: RV 1580/65 implanted 8/23/06 at [facility]. Patients ICD system is NOT approved for MRI(battery and lead). Patient is unable to have any type of MRI.     H/O medication noncompliance     HTN (hypertension) 08/16/2011    Other and unspecified hyperlipidemia 08/16/2011    Pacemaker     Presence of implantable cardioverter-defibrillator (ICD)     Sleep disorder     Tachycardia 08/16/2011    TIA (transient ischemic attack) 08/16/2011     Past Surgical History:   Procedure Laterality Date    CARDIAC DEFIBRILLATOR PLACEMENT  2013    [facility]    DIALYSIS FISTULA CREATION Left 3/4/2021    LEFT BACHIAL CEPHALIC FISTULA CREATION performed by Cali Bill MD at [address] Left 11/4/2021    irrigation and DEBRIDEMENT middle sheath LEFT RING FINGER, Partial AMPUTATION LEFT RING FINGER performed by Raeann Leonardo MD at [facility] 89 Bilateral [id number]    PACEMAKER PLACEMENT       Social History     Socioeconomic History    Marital status:      Spouse name: Not on file    Number of children: 3    Years of education: Not on file    Highest education level: Not on file   Occupational History    Occupation:     Tobacco Use    Smoking status: Every Day     Packs/day: 0.25     Years: 30.00     Pack years: 7.50     Types: Cigarettes     Last attempt to quit: 10/13/2018     Years since quittin.2    Smokeless tobacco: Never    Tobacco comments:     pt has cut down   Vaping Use    Vaping Use: Never used   Substance and Sexual Activity    Alcohol use: No     Alcohol/week: 0.0 standard drinks    Drug use: No    Sexual activity: Not on file   Other Topics Concern    Not on file   Social History Narrative    He follows [employer] and field sports. 3 children and 5 grandchildren.       Social Determinants of Health     Financial Resource Strain: High Risk    Difficulty of Paying Living Expenses: Very hard   Food Insecurity: No Food Insecurity    Worried About Running Out of Food in the Last Year: Never true    Ran Out of Food in the Last Year: Never true   Transportation Needs: Not on file   Physical Activity: Inactive    Days of Exercise per Week: 0 days    Minutes of Exercise per Session: 0 min   Stress: Not on file   Social Connections: Not on file   Intimate Partner Violence: Not on file   Housing Stability: Not on file     Family History   Problem Relation Age of Onset    Osteoarthritis Father     Hypertension Father     Cancer Father         prostate, rectal    Stroke Father     ADHD Sister      Immunization History   Administered Date(s) Administered    COVID-19, PFIZER PURPLE top, DILUTE for use, (age 15 y+), 30mcg/0.3mL 2021, 2021, 10/21/2021    Hep B Vac, Adol/high Risk Infant Dosage - Inactive 2020, 2020, 2021, 2021    Hepatitis B 2020, 2020, 2021, 2021    Hepatitis B Adult (Heplisav-b) 04/06/2022, 05/04/2022, 06/01/2022, 08/03/2022    Hepatitis B vaccine 11/04/2020, 12/02/2020, 01/06/2021, 05/05/2021    Influenza Virus Vaccine 10/15/2018    Influenza, FLUAD, (age 72 y+), Adjuvanted, 0.5mL 09/25/2020, 10/07/2021, 10/17/2022    Influenza, High Dose (Fluzone 65 yrs and older) 12/02/2016, 11/08/2017, 10/13/2018    Influenza, Triv, inactivated, subunit, adjuvanted, IM (Fluad 65 yrs and older) 12/02/2019    Pneumococcal Conjugate 13-valent ([id number]) 03/08/2016    Pneumococcal Polysaccharide ([id number]) 03/07/2018    Tdap (Boostrix, Adacel) 02/18/2016       Review of Systems   Constitutional:  Positive for fatigue. Negative for fever and unexpected weight change. HENT:  Negative for hearing loss. Eyes:  Negative for redness and visual disturbance. Respiratory:  Negative for chest tightness and shortness of breath. Cardiovascular:  Positive for palpitations. Negative for chest pain. Gastrointestinal:  Negative for abdominal pain, nausea and vomiting. Genitourinary:  Positive for hematuria (one episode of hematuria after his fall, rare urination, of small amounts , follow up with Urology). Negative for dysuria. Musculoskeletal:  Negative for arthralgias, back pain and neck pain. Skin:  Negative for rash and wound. Neurological:  Negative for dizziness and headaches. Hematological:  Negative for adenopathy. Does not bruise/bleed easily. Psychiatric/Behavioral:  Positive for sleep disturbance (untreated TREY). Negative for agitation and confusion. The patient is not nervous/anxious. Objective:    Physical Exam  Constitutional:       Appearance: Normal appearance. Eyes:      Extraocular Movements: Extraocular movements intact. Cardiovascular:      Rate and Rhythm: Regular rhythm. Heart sounds: Murmur heard. Pulmonary:      Effort: Pulmonary effort is normal.      Breath sounds: Normal breath sounds. No wheezing or rales.    Musculoskeletal:      Right lower leg: No edema. Left lower leg: No edema. Skin:     Comments: Dark dry skin on his lower legs, bilateral fungal nails    Neurological:      Mental Status: He is alert and oriented to person, place, and time. Vitals:    01/23/23 0950   BP: 100/60   Pulse: 52   Temp: 98.1 °F (36.7 °C)   SpO2: 94%       Assessment and plan       1. Traumatic injury of head, initial encounter  Same as #2.  - CT HEAD WO CONTRAST; Future    2. Anticoagulant long-term use  Risk for traumatic hemorrhage related to head trauma. Check head CT scan. - CT HEAD WO CONTRAST; Future    3. Dependence on renal dialysis ([address]  End-stage renal disease on dialysis. 4. Chronic systolic (congestive) heart failure (HCC)  Stable CHF. 5. Moderate episode of recurrent major depressive disorder (HCC)  Stable depression. Continue antidepressant. 6. Paroxysmal atrial fibrillation (HCC)  Stable paroxysmal atrial fibrillation. Anticoagulant therapy. Recent fall, and episode of hematuria. 7. Coagulation defect, unspecified ([address]  Treatment for atrial fibrillation. 8. Onychomycosis  Bilateral fungal nails. Refer to podiatry.   - AFL - Kae Gomez DPM, Podiatry, [facility]

## 2023-02-02 ENCOUNTER — HOSPITAL ENCOUNTER (OUTPATIENT)
Dept: CT IMAGING | Age: 78
Discharge: HOME | End: 2023-02-02
Payer: MEDICARE

## 2023-02-02 DIAGNOSIS — S09.90XA TRAUMATIC INJURY OF HEAD, INITIAL ENCOUNTER: ICD-10-CM

## 2023-02-02 DIAGNOSIS — Z79.01 ANTICOAGULANT LONG-TERM USE: ICD-10-CM

## 2023-02-02 PROCEDURE — 70450 CT HEAD/BRAIN W/O DYE: CPT

## 2023-02-02 NOTE — RESULT ENCOUNTER NOTE
Please call and notify the patient's wife, Chani, that his head CT scan does not show any problem with bleeding inside his skull.  Everything is fine, they do not need to do anything else.

## 2023-03-15 DIAGNOSIS — F33.1 MODERATE EPISODE OF RECURRENT MAJOR DEPRESSIVE DISORDER (HCC): ICD-10-CM

## 2023-08-15 NOTE — DISCHARGE INSTR - COC
Continuity of Care Form    Patient Name: Nahum Mcqueen.    :  1945  MRN:  [id number]    Admit date:  2021  Discharge date:  2021    Code Status Order: Full Code   Advance Directives:      Admitting Physician:  Darlene Acevedo MD  PCP: Haylie Perez MD    Discharging Nurse: [facility]/Room#: 4796/7796-95  Discharging Unit Phone Number: [phone]    Emergency Contact:   Extended Emergency Contact Information  Primary Emergency Contact: Lizbeth Espino of [address] [facility] Phone: [phone]  Mobile Phone: [phone]  Relation: Spouse  Secondary Emergency Contact: Demetra Jonas   [facility] [address] [facility] Phone: [phone]  Relation: Child    Past Surgical History:  Past Surgical History:   Procedure Laterality Date    CARDIAC DEFIBRILLATOR PLACEMENT      [facility]    DIALYSIS FISTULA CREATION Left 3/4/2021    LEFT BACHIAL CEPHALIC FISTULA CREATION performed by Julian Calderon MD at [address] [place] [address] [employer]    HAND SURGERY Left 2021    irrigation and DEBRIDEMENT middle sheath LEFT RING FINGER, Partial AMPUTATION LEFT RING FINGER performed by Eliel Bender MD at [facility] 89 Bilateral [phone]    PACEMAKER PLACEMENT         Immunization History:   Immunization History   Administered Date(s) Administered    COVID-19, Pfizer, PF, 30mcg/0.3mL 2021, 2021    Hepatitis B 2020, 2020, 2021, 2021    Hepatitis B vaccine 2020, 2020, 2021, 2021    Influenza Virus Vaccine 10/15/2018    Influenza, High Dose (Fluzone 65 yrs and older) 2016, 2017, 10/13/2018    Influenza, Quadv, adjuvanted, 65 yrs +, IM, PF (Fluad) 2020, 10/07/2021    Influenza, Triv, inactivated, subunit, adjuvanted, IM (Fluad 65 yrs and older) 2019    Pneumococcal Conjugate 13-valent (Wmzzqit31) 2016    Pneumococcal Polysaccharide (Jkraouubs47) 2018    Tdap (Boostrix, Adacel) 2016 Last appointment: 7/21/2023  Next appointment: Visit date not found (due for appointment around 1/21/2024)  Last refill: 2/15/2023 Active Problems:  Patient Active Problem List   Diagnosis Code    Cardiomyopathy ([facility] I42.9    Tachycardia R00.0    TIA (transient ischemic attack) G45.9    Essential hypertension I10    Chronic left systolic heart failure (HCC) I50.22    Iron deficiency anemia due to chronic blood loss D50.0    Sleep disorder G47.9    ESRD (end stage renal disease) (HCC) N18.6    Mixed hyperlipidemia E78.2    Moderate episode of recurrent major depressive disorder (HCC) F33.1    Paroxysmal ventricular tachycardia (HCC) I47.2    Chronic kidney disease (CKD), stage IV (severe) (HCC) N18.4    ESRD on dialysis ([facility] N18.6, Z99.2    AVINASH (acute kidney injury) ([facility] N17.9    Allergy, unspecified, initial encounter T78.40XA    Anaphylactic shock, unspecified, initial encounter T78. 2XXA    Arthralgia of knee, left M25.562    Atrial fibrillation (HCC) I48.91    Automatic implantable cardioverter-defibrillator in situ Z95.810    Chronic ischemic heart disease I25.9    Coagulation defect, unspecified (HCC) D68.9    Congestive heart failure (HCC) I50.9    Current use of long term anticoagulation Z79.01    Dependence on renal dialysis ([facility] Z99.2    DNR (do not resuscitate) [address]    Encounter for fitting and adjustment of extracorporeal dialysis catheter ([facility] Z49.01    Goals of care, counseling/discussion Z71.89    Palliative care encounter Z51.5    Encounter for servicing of automatic implantable cardioverter-defibrillator (AICD) at end of battery life Z45.02    GERD (gastroesophageal reflux disease) K21.9    High risk medication use Z79.899    History of TIA (transient ischemic attack) Z86.73    History of ventricular tachycardia Z86.79    Osteomyelitis (HCC) M86.9    Left hand pain M79.642       Isolation/Infection:   Isolation            No Isolation          Patient Infection Status       None to display            Nurse Assessment:  Last Vital Signs: /85   Pulse 81   Temp 98.2 °F (36.8 °C) (Oral)   Resp 16   Ht 5' 6\" (1.676 m)   Wt 130 lb 4.7 oz (59.1 kg)   SpO2 94%   BMI 21.03 kg/m²     Last documented pain score (0-10 scale): Pain Level: 8  Last Weight:   Wt Readings from Last 1 Encounters:   11/05/21 130 lb 4.7 oz (59.1 kg)     Mental Status:  alert & oriented    IV Access:  - None    Nursing Mobility/ADLs:  Walking   Independent  Transfer  Independent  Bathing  Independent  Dressing  Independent  [address] Delivery   whole    Wound Care Documentation and Therapy:        Elimination:  Continence: Bowel: Yes  Bladder: Yes  Urinary Catheter: None   Colostomy/Ileostomy/Ileal Conduit: No       Date of Last BM: 11/5/2021    Intake/Output Summary (Last 24 hours) at 11/6/2021 1757  Last data filed at 11/6/2021 1204  Gross per 24 hour   Intake 1990 ml   Output 1150 ml   Net 840 ml     I/O last 3 completed shifts: In: 1990 [P.O.:840]  Out: 1150     Safety Concerns: At Risk for Falls    Impairments/Disabilities:      None    Nutrition Therapy:  Current Nutrition Therapy:   - Oral Diet:  Low Sodium (3-4gm)    Routes of Feeding: Oral  Liquids: Thin Liquids  Daily Fluid Restriction: no  Last Modified Barium Swallow with Video (Video Swallowing Test): not done    Treatments at the Time of Hospital Discharge:   Respiratory Treatments: None  Oxygen Therapy:  is not on home oxygen therapy.   Ventilator:    - No ventilator support    Rehab Therapies: None  Weight Bearing Status/Restrictions: No weight bearing restirctions  Other Medical Equipment (for information only, NOT a DME order):  None  Other Treatments: ***    Patient's personal belongings (please select all that are sent with patient):  Lynnette SERVIN SIGNATURE:  Ivanna Gama RN    CASE MANAGEMENT/SOCIAL WORK SECTION    Inpatient Status Date: ***    Readmission Risk Assessment Score:  Readmission Risk              Risk of Unplanned Readmission:  16           Discharging to Facility/ Agency   Name: Address:  Phone:  Fax:    Dialysis Facility (if applicable)   Name:  Address:  Dialysis Schedule:  Phone:  Fax:    / signature: {Esignature:202275020}    PHYSICIAN SECTION    Prognosis: {Prognosis:7145980609}    Condition at Discharge: Yoni Rosas Patient Condition:312588246}    Rehab Potential (if transferring to Rehab): {Prognosis:5195888263}    Recommended Labs or Other Treatments After Discharge: ***    Physician Certification: I certify the above information and transfer of Vanesa.  is necessary for the continuing treatment of the diagnosis listed and that he requires {Admit to Appropriate Level of Care:27220} for {GREATER/LESS:859594737} 30 days.      Update Admission H&P: {CHP DME Changes in XEFDK:095727178}    PHYSICIAN SIGNATURE:  {Esignature:824712220}

## 2023-08-17 ENCOUNTER — OFFICE VISIT (OUTPATIENT)
Dept: INTERNAL MEDICINE CLINIC | Age: 78
End: 2023-08-17

## 2023-08-17 VITALS
DIASTOLIC BLOOD PRESSURE: 60 MMHG | SYSTOLIC BLOOD PRESSURE: 106 MMHG | HEART RATE: 92 BPM | HEIGHT: 66 IN | WEIGHT: 138 LBS | OXYGEN SATURATION: 93 % | BODY MASS INDEX: 22.18 KG/M2 | TEMPERATURE: 98.1 F

## 2023-08-17 DIAGNOSIS — I50.42 CHRONIC COMBINED SYSTOLIC AND DIASTOLIC CONGESTIVE HEART FAILURE (HCC): Primary | Chronic | ICD-10-CM

## 2023-08-17 DIAGNOSIS — Z99.2 ESRD ON DIALYSIS (HCC): ICD-10-CM

## 2023-08-17 DIAGNOSIS — I48.91 ATRIAL FIBRILLATION, UNSPECIFIED TYPE (HCC): ICD-10-CM

## 2023-08-17 DIAGNOSIS — G89.4 CHRONIC PAIN SYNDROME: ICD-10-CM

## 2023-08-17 DIAGNOSIS — M15.9 PRIMARY OSTEOARTHRITIS INVOLVING MULTIPLE JOINTS: ICD-10-CM

## 2023-08-17 DIAGNOSIS — N18.6 ESRD ON DIALYSIS (HCC): ICD-10-CM

## 2023-08-17 RX ORDER — HYDROCODONE BITARTRATE AND ACETAMINOPHEN 5; 325 MG/1; MG/1
1 TABLET ORAL EVERY 6 HOURS PRN
Qty: 28 TABLET | Refills: 0 | Status: CANCELLED | OUTPATIENT
Start: 2023-08-17 | End: 2023-08-24

## 2023-08-17 ASSESSMENT — ENCOUNTER SYMPTOMS
TROUBLE SWALLOWING: 0
COUGH: 0
SINUS PRESSURE: 0
BACK PAIN: 0
CONSTIPATION: 0
DIARRHEA: 0
CHEST TIGHTNESS: 0
ABDOMINAL PAIN: 0
VOICE CHANGE: 0
WHEEZING: 0
ABDOMINAL DISTENTION: 0
BLOOD IN STOOL: 0
SORE THROAT: 0
VOMITING: 0
SINUS PAIN: 0
SHORTNESS OF BREATH: 0
NAUSEA: 0

## 2023-08-17 NOTE — PROGRESS NOTES
clinic. 2. Chronic combined systolic and diastolic congestive heart failure ([address])  -Seems reasonably well compensated. He is on a complex regimen. He sees Dr. Jose Antonio Medina at [facility]. 3. Atrial fibrillation, unspecified type ([address])  -Patient is on a combination of amiodarone 200 mg daily, metoprolol XL 25 mg daily, and a apixaban. 4. ESRD on dialysis [facility])  -Wednesday Friday dialysis from Dr. Amelia Borjas. 5. Chronic pain syndrome  - AFL - Brice Taylor MD, Pain Management, [facility]    7. And has chronic passive hepatic congestion/hepatic fibrosis  -He is seeing Dr. Nickolas Cortez MD         Orders Placed This Encounter   Procedures    Dinah Way [facility]      No orders of the defined types were placed in this encounter. There are no discontinued medications. Return in about 6 months (around 2/17/2024) for AWV. Chaparro Eldridge MD    This dictation was generated by voice recognition computer software. Although all attempts are made to edit the dictation for accuracy, there may be errors in the transcription that are not intended.

## 2023-09-19 NOTE — Clinical Note
I saw Rakan Gabriel in follow-up today. He had a left brachiocephalic fistula created March 4. It has matured very well. I would anticipate it is able to use in 2 weeks.   Thanks again  rich Please follow up with your primary care doctor in one week for a blood pressure check.

## 2023-09-21 LAB
LEFT VENTRICULAR EJECTION FRACTION, EXTERNAL: 39
LEFT VENTRICULAR EJECTION FRACTION, EXTERNAL: NORMAL

## 2023-11-30 ENCOUNTER — TELEPHONE (OUTPATIENT)
Dept: INTERNAL MEDICINE CLINIC | Age: 78
End: 2023-11-30

## 2023-11-30 NOTE — TELEPHONE ENCOUNTER
----- Message from Percy Guerra sent at 11/30/2023  8:26 AM EST -----  Subject: Message to Provider    QUESTIONS  Information for Provider? PLEASE CALL DANIELLE TO RESCHEDULE HER HUSBANDS   APPT FOR THIS MORNING IT IS AN ACUTE CARE APPT FOR EAR PAIN AND HEARING   LOSS. ECC UNABLE TO REACH THE  FOR RESCHEDULE OF THE ACUTE CARE   APPT AT 11:00 TODAY.  ---------------------------------------------------------------------------  --------------  Ro ROCHA  [phone]; OK to leave message on voicemail  ---------------------------------------------------------------------------  --------------  SCRIPT ANSWERS  Relationship to Patient? Spouse/Partner  Representative Name? DANIELLE  Is the representative on the Communication Release of Information (LOPEZ)   form in Epic?  Yes

## 2023-12-07 ENCOUNTER — OFFICE VISIT (OUTPATIENT)
Dept: INTERNAL MEDICINE CLINIC | Age: 78
End: 2023-12-07

## 2023-12-07 VITALS
OXYGEN SATURATION: 97 % | HEIGHT: 66 IN | WEIGHT: 129 LBS | SYSTOLIC BLOOD PRESSURE: 133 MMHG | TEMPERATURE: 97 F | HEART RATE: 74 BPM | RESPIRATION RATE: 18 BRPM | DIASTOLIC BLOOD PRESSURE: 82 MMHG | BODY MASS INDEX: 20.73 KG/M2

## 2023-12-07 DIAGNOSIS — I42.0 DILATED CARDIOMYOPATHY (HCC): ICD-10-CM

## 2023-12-07 DIAGNOSIS — H65.91 RIGHT NON-SUPPURATIVE OTITIS MEDIA: Primary | ICD-10-CM

## 2023-12-07 RX ORDER — AMOXICILLIN AND CLAVULANATE POTASSIUM 875; 125 MG/1; MG/1
1 TABLET, FILM COATED ORAL 2 TIMES DAILY
Qty: 14 TABLET | Refills: 0 | Status: SHIPPED | OUTPATIENT
Start: 2023-12-07 | End: 2023-12-14

## 2023-12-07 ASSESSMENT — ENCOUNTER SYMPTOMS
COUGH: 0
BACK PAIN: 0
VOICE CHANGE: 0
CHEST TIGHTNESS: 0
ABDOMINAL DISTENTION: 0
BLOOD IN STOOL: 0
ABDOMINAL PAIN: 0
NAUSEA: 0
SINUS PAIN: 0
DIARRHEA: 0
WHEEZING: 0
VOMITING: 0
SORE THROAT: 0
CONSTIPATION: 0
SINUS PRESSURE: 0
SHORTNESS OF BREATH: 0
TROUBLE SWALLOWING: 0

## 2023-12-07 NOTE — PROGRESS NOTES
present. Mental Status: He is alert and oriented to person, place, and time. Psychiatric:         Behavior: Behavior normal.         Thought Content: Thought content normal.         Judgment: Judgment normal.         ASSESSMENT/ PLAN:  1. Right non-suppurative otitis media  - amoxicillin-clavulanate (AUGMENTIN) 875-125 MG per tablet; Take 1 tablet by mouth 2 times daily for 7 days  Dispense: 14 tablet; Refill: 0    2. Dilated cardiomyopathy ([address])  -Stable       No orders of the defined types were placed in this encounter. Orders Placed This Encounter   Medications    amoxicillin-clavulanate (AUGMENTIN) 875-125 MG per tablet     Sig: Take 1 tablet by mouth 2 times daily for 7 days     Dispense:  14 tablet     Refill:  0      There are no discontinued medications. No follow-ups on file. Maria E Barragan MD    This dictation was generated by voice recognition computer software. Although all attempts are made to edit the dictation for accuracy, there may be errors in the transcription that are not intended.

## 2024-01-01 NOTE — FLOWSHEET NOTE
[address],[facility], [address]  Phone: [phone]   Fax:     [phone]    Physical Therapy Daily Treatment Note  Date:  2020    Patient Name:  Martin Bull    :  1945  MRN: [id number]  Restrictions/Precautions:    Medical/Treatment Diagnosis Information:  Diagnosis: S43.101A (ICD-10-CM) - Separation of right acromioclavicular joint, initial encounter  Treatment Diagnosis: M25.511 Pain in Right Shoulder  Insurance/Certification information:  PT Insurance Information: [employer]  Physician Information:  Referring Practitioner: Malissa Vincent MD  Has the plan of care been signed (Y/N):        []  Yes  [x]  No     Date of Patient follow up with Physician:       Is this a Progress Report:     []  Yes  [x]  No        If Yes:  Date Range for reporting period:  Beginning 20  Ending 20    Progress report will be due (10 Rx or 30 days whichever is less): 0/10/37      Recertification will be due (POC Duration  / 90 days whichever is less): 20         Visit # Insurance Allowable Auth Required   4   [place] []  Yes [x]  No        Functional Scale: UEFS: 70% deficit   Date assessed:  20    Latex Allergy:  [x]NO      []YES  Preferred Language for Healthcare:   [x]English       []other:    Pain level:  0-810       SUBJECTIVE:   Patient states that he is doing okay. He states his opposite shoulder is bothering him now.      OBJECTIVE:   ROM  PROM AROM  Comment    L R L R    Flexion   180 180 + pain present    Abduction   180 180 + pain present    ER   C7 R ear    IR    R PSIS    Other  (cervical)        Other             Strength   L R Comment   Flexion 5 4    Abduction 5 4    ER 5 3    IR 5 4    Supraspinatus      Upper Trap      Lower Trap      Mid Trap      Rhomboids      Biceps 5 5    Triceps      Horizontal Abduction      Horizontal Adduction      Lats        Special Stable, labs today   Tests  6/24 Results/Comment   Tera Murphy negative   Speeds    OBriens    Apprehension     Load & Shift         Reflexes/Sensation: 6/24              [x]Dermatomes/Myotomes intact               []Reflexes equal and normal bilaterally               []Other:     Joint mobility: 6/24              [x]Normal               []Hypo              []Hyper     Palpation: Complete separation of AC joint  6/24     Functional Mobility/Transfers: Unable to use R UE; Pain with lifting and reaching; unable to lift anything heavy; difficulty and pain with sleeping; difficulty using R UE with dressing  6/24     Posture: Forward head and rounded shoulders  6/24     Gait: (include devices/WB status): WNL  6/24                       [x] Patient history, allergies, meds reviewed. Medical chart reviewed. See intake form. 6/24     Review Of Systems (ROS):  [x]Performed Review of systems (Integumentary, CardioPulmonary, Neurological) by intake and observation. Intake form has been scanned into medical record. Patient has been instructed to contact their primary care physician regarding ROS issues if not already being addressed at this time.   6/24    RESTRICTIONS/PRECAUTIONS: AC joint separation     Exercises/Interventions:   Exercises:  Exercise/Equipment Resistance/Repetitions Other comments   Stretching/PROM     Wand     Table Slides 10x    UE [place]     ER at side 10 sec x 10 Start 7/2   Pulleys     Pendulum        Isometrics     Retraction      30x Start 7/2   Weight shift     Flexion     Abduction     External Rotation     Internal Rotation     Biceps     Triceps          PRE's     Flexion 3 x 10 TO 90; start 730   Abduction     External Rotation     Internal Rotation     Shrugs 3 x 10    EXT     Reverse Flys     Serratus     Horizontal Abd with ER     Biceps 3 x 10; 2# ^7/23   Triceps     Retraction 3 x 10; 3# Start 7/30        Cable Column/Theraband     External Rotation 3 x 10; green tband Start 7/23   Internal Rotation 3 x 10; green tband Start 7/23   Shrugs     Lats     Ext     Flex     Scapular Retraction 3 x 10; green tband Start 7/2   BIC     TRIC     PNF     No money 3 x 10; yellow tband Start 7/30   Dynamic Stability          Plyoback          Manual interventions                     Therapeutic Exercise and NMR EXR  [x] (28795) Provided verbal/tactile cueing for activities related to strengthening, flexibility, endurance, ROM  for improvements in scapular, scapulothoracic and UE control with self care, reaching, carrying, lifting, house/yardwork, driving/computer work.    [] (18846) Provided verbal/tactile cueing for activities related to improving balance, coordination, kinesthetic sense, posture, motor skill, proprioception  to assist with  scapular, scapulothoracic and UE control with self care, reaching, carrying, lifting, house/yardwork, driving/computer work. Therapeutic Activities:    [] (62768 or 87170) Provided verbal/tactile cueing for activities related to improving balance, coordination, kinesthetic sense, posture, motor skill, proprioception and motor activation to allow for proper function of scapular, scapulothoracic and UE control with self care, carrying, lifting, driving/computer work. Home Exercise Program:    [x] (21911) Reviewed/Progressed HEP activities related to strengthening, flexibility, endurance, ROM of scapular, scapulothoracic and UE control with self care, reaching, carrying, lifting, house/yardwork, driving/computer work  [] (45752) Reviewed/Progressed HEP activities related to improving balance, coordination, kinesthetic sense, posture, motor skill, proprioception of scapular, scapulothoracic and UE control with self care, reaching, carrying, lifting, house/yardwork, driving/computer work    Access Code: [email or web address]   URL: [email or web address]   Date: 07/23/2020   Prepared by: Sadaf Quinn     Manual Treatments:  PROM / STM / Oscillations-Mobs:  G-I, II, III, IV (PA's,

## 2024-01-25 ENCOUNTER — TELEPHONE (OUTPATIENT)
Dept: INTERNAL MEDICINE CLINIC | Age: 79
End: 2024-01-25

## 2024-01-25 DIAGNOSIS — G89.29 OTHER CHRONIC PAIN: Primary | ICD-10-CM

## 2024-01-25 NOTE — TELEPHONE ENCOUNTER
Pts wife Chain is requesting a referral for the pt for pain management due to the pt complaining for months now that he has had pain all over his body as well as in the arm he gets dialysis in. She is asking if it can be a [facility] pain management group.       Please call and advise [phone]

## 2024-01-25 NOTE — TELEPHONE ENCOUNTER
Sure.  He likely will not require referral.  Just have him call [facility] pain management and make an appointment.  If they wish, they can send us the referral paperwork and we can fill out.

## 2024-01-29 NOTE — TELEPHONE ENCOUNTER
Spoke to Chani, info given to pain physician at [facility].    Trent Hatfield- [phone]    Referral faxed

## (undated) DEVICE — PADDING CAST W4INXL4YD SPUN DACRON POLY POR SYN VERSATILE

## (undated) DEVICE — COVER LT HNDL BLU PLAS

## (undated) DEVICE — CLIP LIG M BLU TI HRT SHP WIRE HORZ 180 PER BX

## (undated) DEVICE — TOWEL,OR,DSP,ST,WHITE,DLX,XR,4/PK,20PK/C: Brand: MEDLINE

## (undated) DEVICE — SUTURE NONABSORBABLE MONOFILAMENT 7-0 BV-1 1X24 IN PROLENE 8702H

## (undated) DEVICE — GOWN SIRUS NONREIN XL W/TWL: Brand: MEDLINE INDUSTRIES, INC.

## (undated) DEVICE — SPLINT PLSTR OF PARIS W4XL15IN EXTRA FAST SET GYPS S

## (undated) DEVICE — SUTURE PERMAHAND SZ 4-0 L18IN NONABSORBABLE BLK SILK BRAID A183H

## (undated) DEVICE — GARMENT,MEDLINE,DVT,INT,CALF,MED, GEN2: Brand: MEDLINE

## (undated) DEVICE — INTENDED FOR TISSUE SEPARATION, AND OTHER PROCEDURES THAT REQUIRE A SHARP SURGICAL BLADE TO PUNCTURE OR CUT.: Brand: BARD-PARKER ® CARBON RIB-BACK BLADES

## (undated) DEVICE — CABLE BPLR L12FT FLYING LD DISPOSABLE

## (undated) DEVICE — SOLUTION IRRIG 3000ML 0.9% SOD CHL USP UROMATIC PLAS CONT

## (undated) DEVICE — LOOP,VESSEL,MAXI,BLUE,2/PK,STERILE: Brand: MEDLINE

## (undated) DEVICE — SPONGE GZ W4XL8IN COT WVN 12 PLY

## (undated) DEVICE — MERCY FAIRFIELD TURNOVER KIT: Brand: MEDLINE INDUSTRIES, INC.

## (undated) DEVICE — STERILE LATEX POWDER FREE SURGICAL GLOVES WITH HYDROGEL COATING: Brand: PROTEXIS

## (undated) DEVICE — 1010 S-DRAPE TOWEL DRAPE 10/BX: Brand: STERI-DRAPE™

## (undated) DEVICE — BLADE ES ELASTOMERIC COAT INSUL DURABLE BEND UPTO 90DEG

## (undated) DEVICE — TRAY PREP DRY W/ PREM GLV 2 APPL 6 SPNG 2 UNDPD 1 OVERWRAP

## (undated) DEVICE — TRAY,IRRIGATION,BULBSYRINGE,60ML,CSR,PVP: Brand: MEDLINE

## (undated) DEVICE — SUTURE N ABSRB MONOFILAMENT 6-0 BV1 24 IN DA BLU PROLENE EP8805H

## (undated) DEVICE — ELECTRODE PT RET AD L9FT HI MOIST COND ADH HYDRGEL CORDED

## (undated) DEVICE — SUTURE MCRYL SZ 4-0 L27IN ABSRB UD L19MM PS-2 1/2 CIR PRIM Y426H

## (undated) DEVICE — SUTURE PROL SZ 6 0 L24IN NONABSORBABLE BLU C 1 L13MM 3 8 CIR EP8726H

## (undated) DEVICE — SHEET,DRAPE,53X77,STERILE: Brand: MEDLINE

## (undated) DEVICE — MAJOR SET UP PK

## (undated) DEVICE — STRIP WND PK W0.25INXL5YD IODO GZ TIGHTLY WVN CURAD

## (undated) DEVICE — BLADE OPHTH 180DEG CUT SURF BLU STR SHRP DBL BVL GRINDLESS

## (undated) DEVICE — SOLUTION IV 1000ML 0.9% SOD CHL

## (undated) DEVICE — BANDAGE COBAN 4 IN COMPR W4INXL5YD FOAM COHESIVE QUIK STK SELF ADH SFT

## (undated) DEVICE — APPLICATOR PREP 26ML 0.7% IOD POVACRYLEX 74% ISO ALC ST

## (undated) DEVICE — 20 ML SYRINGE LUER-LOCK TIP: Brand: MONOJECT

## (undated) DEVICE — SOLUTION IV IRRIG POUR BRL 0.9% SODIUM CHL 2F7124

## (undated) DEVICE — PADDING CAST W4INXL4YD HIGHLY ABSRB THAN COT EZ APPL

## (undated) DEVICE — SYRINGE IRRIG 60ML SFT PLIABLE BLB EZ TO GRP 1 HND USE W/

## (undated) DEVICE — SUTURE BOOT: Brand: DEROYAL

## (undated) DEVICE — HAND: Brand: MEDLINE INDUSTRIES, INC.

## (undated) DEVICE — CONTAINER,SPECIMEN,PNEU TUBE,3OZ,OR STRL: Brand: MEDLINE

## (undated) DEVICE — DRAPE,HAND,STERILE: Brand: MEDLINE

## (undated) DEVICE — CURITY STRETCH BANDAGE: Brand: CURITY

## (undated) DEVICE — GLOVE ORTHO 7 1/2   MSG9475

## (undated) DEVICE — SUTURE PERMAHAND SZ 2-0 L12X18IN NONABSORBABLE BLK SILK A185H

## (undated) DEVICE — GOWN,SIRUS,POLYRNF,BRTHSLV,XL,30/CS: Brand: MEDLINE

## (undated) DEVICE — FOGARTY - HYDRAGRIP SURGICAL - CLAMP INSERTS: Brand: FOGARTY SOFTJAW

## (undated) DEVICE — INTENDED FOR TISSUE SEPARATION, AND OTHER PROCEDURES THAT REQUIRE A SHARP SURGICAL BLADE TO PUNCTURE OR CUT.: Brand: BARD-PARKER ® STAINLESS STEEL BLADES

## (undated) DEVICE — DECANTER FLD 9IN ST BG FOR ASEP TRNSF OF FLD

## (undated) DEVICE — SUTURE NONABSORBABLE MONOFILAMENT 4-0 PS-2 18 IN BLK ETHILON 1667G

## (undated) DEVICE — T-DRAPE,EXTREMITY,STERILE: Brand: MEDLINE

## (undated) DEVICE — COUNTER NDL 40 COUNT HLD 70 NUM FOAM BLK SGL MAG W BLDE REMV

## (undated) DEVICE — GOWN,SIRUS,POLYRNF,BRTHSLV,XLN/XL,20/CS: Brand: MEDLINE

## (undated) DEVICE — CORD,CAUTERY,BIPOLAR,STERILE: Brand: MEDLINE

## (undated) DEVICE — SPONGES GAUZE X-RAY 4X4 16PLY

## (undated) DEVICE — CATHETER IV 18 GAX125 IN WNG INTROCAN SAFETY

## (undated) DEVICE — SUTURE PDS II SZ 4-0 L18IN ABSRB UD L19MM PS-2 3/8 CIR PRIM Z496G

## (undated) DEVICE — TUBING IRRIG L77IN DIA0.241IN L BOR FOR CYSTO W/ NVENT

## (undated) DEVICE — INTENDED TO BE USED TO OCCLUDE, RETRACT AND IDENTIFY ARTERIES, VEINS, TENDONS AND NERVES IN SURGICAL PROCEDURES: Brand: STERION®  VESSEL LOOP

## (undated) DEVICE — SUTURE ETHLN SZ 3-0 L18IN NONABSORBABLE BLK FS-1 L24MM 3/8 663H

## (undated) DEVICE — BANDAGE COMPR W1INXL5YD BGE E ADH TENSOPLAST

## (undated) DEVICE — GLOVE SURG SZ 8 L12IN FNGR THK79MIL GRN LTX FREE

## (undated) DEVICE — SUTURE VCRL SZ 3-0 L27IN ABSRB UD L26MM SH 1/2 CIR J416H

## (undated) DEVICE — UNDERGLOVE SURG SZ 8 BLU LTX FREE SYN POLYISOPRENE POLYMER

## (undated) DEVICE — TOWEL,STOP FLAG GOLD N-W: Brand: MEDLINE

## (undated) DEVICE — BANDAGE,GAUZE,CONFORMING,3"X75",STRL,LF: Brand: MEDLINE